# Patient Record
Sex: FEMALE | Race: OTHER | Employment: PART TIME | URBAN - METROPOLITAN AREA
[De-identification: names, ages, dates, MRNs, and addresses within clinical notes are randomized per-mention and may not be internally consistent; named-entity substitution may affect disease eponyms.]

---

## 2020-01-29 ENCOUNTER — HOSPITAL ENCOUNTER (OUTPATIENT)
Dept: PHYSICAL THERAPY | Age: 65
Discharge: HOME | End: 2020-01-29
Payer: MEDICARE

## 2020-01-29 PROCEDURE — 97110 THERAPEUTIC EXERCISES: CPT

## 2020-01-29 PROCEDURE — 97161 PT EVAL LOW COMPLEX 20 MIN: CPT

## 2020-01-29 NOTE — PROGRESS NOTES
PT DAILY TREATMENT NOTE/LUMBAR EVAL 10-18    Patient Name: Juanita Light  Date:2020  : 1955  [x]  Patient  Verified  Payor: Ludwig Drafts / Plan: [place] MED[place] & B / Product Type: Medicare /    In time:1110  Out time:1211  Total Treatment Time (min): 25  Visit #: 1 of 16    Medicare/BCBS Only   Total Timed Codes (min):  25 1:1 Treatment Time:  25     Treatment Area: Low back pain [M54.5]  SUBJECTIVE  Pain Level (0-10 scale): 0  []constant []intermittent [x]improving []worsening []no change since onset    Any medication changes, allergies to medications, adverse drug reactions, diagnosis change, or new procedure performed?: [x] No    [] Yes (see summary sheet for update)  Subjective functional status/changes:     Patient has c/c of low back and left LE pain since 2019 which progressively worsened. Temporary relief with injections. MRI revealed HNP L3-4. Patient underwent L3-4 discectomy on 19. Noted good relief of left LE pain with surgery but an increase in low back pain. Now referred to PT due to persistence of pain. Patient describes pain as lumbar aching and stiffnes. Pain is worse in AM. Reports intermittent numbness/tingling left anterior knee. Denies popping/clicking. Aggravating factors: left sidelying, standing >5 mins, walking >10 mins. Alleviating factors: brief walking, moist heat. Denies red flags: SOB, chest pain, dizziness/lightheadedness, blurred/double vision, HA, chills/fevers, night sweats, change in bowel/bladder control, abdominal pain, difficulty swallowing, slurred speech, unexplained weight gain/loss, nausea, vomiting. PMHx: OA, HTN, osteoporosis, colon cance. Surgical Hx: colon resection, bariatric surgery. Social Hx: , two level home, social alcohol, no tobacco, works 30 hours per week as . PLOF: treadmill 3 miles 3-4 x/wk. CLOF: unable to participate in cardiovascular exercise at present.   Diagnostic Imaging: MRI pre-op      OBJECTIVE/EXAMINATION    Precautions: right abdominal hernia, limit bending, lifting, twisting s/p discectomy    25 min [x]Eval                  []Re-Eval       25 min Therapeutic Exercise:  [x] See flow sheet :   Rationale: increase ROM, increase strength and decrease pain to improve the patients ability to complete ADLs          With   [] TE   [] TA   [] neuro   [] other: Patient Education: [x] Review HEP    [] Progressed/Changed HEP based on:   [] positioning   [] body mechanics   [] transfers   [] heat/ice application    [] other:      Physical Therapy Evaluation - Lumbar Spine    OBJECTIVE  Posture:  Lateral Shift: [] R    [] L     [] +  [] -  Kyphosis: [] Increased [] Decreased   []  WNL  Lordosis:  [] Increased [] Decreased   [] WNL  Pelvic symmetry: [] WNL    [] Other:    Gait:  [] Normal     [] Abnormal:    Active Movements: [] N/A   [] Too acute   [] Other:  ROM Degrees Comments:pain, area   Forward flexion  71   Fingertips 7 inches from floor   Extension  23   Increased pain   SB right  28    SB left  45    Rotation right  61    Rotation left  56        Neuro Screen [] WNL  Myotome/Dermatome/Reflexes:   Comments: Left L4 diminished sensation, sensitivity to touch    Dural Mobility:  SLR Supine: [] R    [] L    [] +    [x] -  @ (degrees):   Slump Test: [] R    [] L    [] +    [x] -  @ (degrees):   Prone Knee Bend: [] R    [] L    [] +    [x] -     Palpation  [] Min  [] Mod  [] Severe    Location: no tenderness to palpation lumbar or left LE, incision well healed  [] Min  [] Mod  [] Severe    Location:    Strength   L(0-5) R (0-5)   Hip Flexion (L1,2) 3+ 5   Knee Extension (L3,4) 3+ 5   Ankle Dorsiflexion (L4) 5 5   Great Toe Extension (L5) 5 5   Ankle Plantarflexion (S1) 5 5   Knee Flexion (S1,2) 4 5   Abdominals 2 2   Paraspinals 4 5   Back Rotators 4 5   Gluteus Niles 4 5   Hip Abduction 4 5     Special Tests  Lumbar:  Lumb.  Compression: [] Pos  [x] Neg               Lumbar Distraction: [] Pos  [x] Neg    Quadrant:  [x] Pos  [] Neg   [] Flex  [x] Ext    Sacroilliac:  Gaenslen's: [] R    [] L    [] +    [x] -     Compression: [] +    [x] -     Gapping:  [] +    [x] -              Hip: Cresenciano Son:  [] R    [] L    [] +    [x] -     Scour:  [] R    [] L    [] +    [x] -     Piriformis: [] R    [] L    [] +    [] -     Other tests/comments:       Pain Level (0-10 scale) post treatment: lumbar 0    ASSESSMENT/Changes in Function: Patient presents s/p L3-4 discectomy performed 11/26/19 with deficits in trunk AROM, core/abdominal and left LE strength with limited activity tolerance and mild to moderate lumbar pain. Patient will continue to benefit from skilled PT services to modify and progress therapeutic interventions, address functional mobility deficits, address ROM deficits, address strength deficits, analyze and address soft tissue restrictions, analyze and cue movement patterns, analyze and modify body mechanics/ergonomics and assess and modify postural abnormalities to attain remaining goals.      [x]  See Plan of Care  []  See progress note/recertification  []  See Discharge Summary         Progress towards goals / Updated goals:  See POC    PLAN  []  Upgrade activities as tolerated     [x]  Continue plan of care  []  Update interventions per flow sheet       []  Discharge due to:_  []  Other:_      Ulises Bates PT 1/29/2020  11:14 AM

## 2020-01-29 NOTE — PROGRESS NOTES
[facility] at [address] [place] [address] Drive: [phone]   Fax: [phone]  PLAN OF CARE / 5 [facility]  Patient Name: Fahad Harden : 1955   Medical   Diagnosis: Low back pain [M54.5] Treatment Diagnosis: Low back pain   Onset Date: 2019     Referral Source: Kota Snyder MD Start of Care [facility] [place]): 2020   Prior Hospitalization: See medical history Provider #: [id number]   Prior Level of Function: treadmill 3 miles 3-4 x/wk   Comorbidities: OA, HTN, osteoporosis, colon cancer   Medications: Verified on Patient Summary List   The Plan of Care and following information is based on the information from the initial evaluation.   ===========================================================================================  Assessment / key information:  Fahad Harden is a 75 y.o.  female who presents s/p L3-4 discectomy performed 19 with deficits in trunk AROM, core/abdominal and left LE strength with limited activity tolerance and mild to moderate lumbar pain.     Patient will continue to benefit from skilled PT services to modify and progress therapeutic interventions, address functional mobility deficits, address ROM deficits, address strength deficits, analyze and address soft tissue restrictions, analyze and cue movement patterns, analyze and modify body mechanics/ergonomics and assess and modify postural abnormalities to attain remaining goals.       Pt instructed in HEP and will f/u in clinic for PT.  ===========================================================================================  Eval Complexity: History MEDIUM  Complexity : 1-2 comorbidities / personal factors will impact the outcome/ POC ;  Examination  LOW Complexity : 1-2 Standardized tests and measures addressing body structure, function, activity limitation and / or participation in recreation ; Presentation LOW Complexity : Stable, uncomplicated ;  Decision Making MEDIUM Complexity : FOTO score of 26-74; : FOTO score = an established functional score where 100 = no disability  Overall Complexity LOW   Problem List: pain affecting function, decrease ROM, decrease strength, impaired gait/ balance, decrease ADL/ functional abilitiies, decrease activity tolerance, decrease flexibility/ joint mobility and decrease transfer abilities   Treatment Plan may include any combination of the following: Therapeutic exercise, Therapeutic activities, Neuromuscular re-education, Physical agent/modality, Gait/balance training, Manual therapy, Aquatic therapy, Patient education, Self Care training and Functional mobility training  Patient / Family readiness to learn indicated by: asking questions, trying to perform skills and interest  Persons(s) to be included in education: patient (P)  Barriers to Learning/Limitations: no  Measures Taken: NA  Patient Goal (s): \"Strengthen my back. \"   Patient self reported health status: good  Rehabilitation Potential: good  Goals:  Short Term Goals: To be accomplished in 4 weeks:   Patient will report compliance with HEP 1x/day to aid in rehabilitation program.   Status at IE: Patient instructed in and provided written copy of initial Home Exercise Program.   Current: Same as IE      Patient will increase lumbar ROM to WNL in all planes to aid in completion of ADLs. Status at IE:  ROM Degrees Comments:pain, area   Forward flexion  71   Fingertips 7 inches from floor   Extension  23   Increased pain   SB right  28     SB left  45     Rotation right  61     Rotation left  56      Current: Same as IE    Long Term Goals: To be accomplished in 8 weeks:   Patient will increase core and left LE strength to 4+/5 MMT throughout to aid in completion of ADLs.    Status at IE:   L(0-5) R (0-5)   Hip Flexion (L1,2) 3+ 5   Knee Extension (L3,4) 3+ 5   Ankle Plantarflexion (S1) 5 5   Knee Flexion (S1,2) 4 5   Abdominals 2 2 Paraspinals 4 5   Back Rotators 4 5   Gluteus Niles 4 5   Hip Abduction 4 5    Current: Same as IE     Patient will report pain no greater than 2/10 throughout entire day to aid in completion of ADLs. Status at IE: 0-9   Current: Same as IE     Patent will be able to return to walking on treadmill one hour/ 3 miles as part of regular exercise regimen. Status at IE: currently unable to tolerate treadmill. Current: Same as IE     Patient will improve FOTO (an established functional score where 100 = no disability) score to 63 points to demonstrate improvement in functional status. Status at IE: 46   Current: Same as IE        Frequency / Duration:   Patient to be seen 2  times per week for 8  weeks:  Patient / Caregiver education and instruction: self care and exercises      . Therapist Signature: Matheus Mesa DPT Date: 2/31/7656   Certification Period: 1/29/2020 to 4/27/2020 Time: 12:13 PM   ===========================================================================================  I certify that the above Physical Therapy Services are being furnished while the patient is under my care. I agree with the treatment plan and certify that this therapy is necessary. Physician Signature:        Date:       Time:     Please sign and return to [facility] or you may fax the signed copy to [phone]. Thank you.

## 2020-02-04 ENCOUNTER — HOSPITAL ENCOUNTER (OUTPATIENT)
Dept: PHYSICAL THERAPY | Age: 65
Discharge: HOME | End: 2020-02-04
Payer: MEDICARE

## 2020-02-04 PROCEDURE — 97110 THERAPEUTIC EXERCISES: CPT

## 2020-02-04 NOTE — PROGRESS NOTES
PT DAILY TREATMENT NOTE - [facility]     Patient Name: Zenon Santos  Date:2020  : 1955  [x]  Patient  Verified  Payor: Taylor Martinez / Plan: [place] MEDICARE PART A & B / Product Type: Medicare /    In time:1100  Out time:1145  Total Treatment Time (min): 45  Total Timed Codes (min): 45   1:1 Treatment Time ( [address] only): 45   Visit #: 2 of 16    Treatment Area: Low back pain [M54.5]    SUBJECTIVE  Pain Level (0-10 scale): 2  Any medication changes, allergies to medications, adverse drug reactions, diagnosis change, or new procedure performed?: [x] No    [] Yes (see summary sheet for update)  Subjective functional status/changes:   [] No changes reported    Patient reports that she has low back pain today. She states that she expects to have back pain . OBJECTIVE    45 min Therapeutic Exercise:  [x] See flow sheet :   Rationale: increase ROM, increase strength and decrease pain to improve the patients ability to complete ADLs          With   [x] TE   [] TA   [] neuro   [] other: Patient Education: [x] Review HEP    [] Progressed/Changed HEP based on:   [] positioning   [] body mechanics   [] transfers   [] heat/ice application    [] other:      Other Objective/Functional Measures: NA     Pain Level (0-10 scale) post treatment: 0    ASSESSMENT/Changes in Function: Patient responds well to treatment session. Patient required cues for activity pacing. Reviewed HEP to promote good carryover at home. No adverse effects were noted from today's treatment session      Patient will continue to benefit from skilled PT services to modify and progress therapeutic interventions, address functional mobility deficits, address ROM deficits, address strength deficits, analyze and address soft tissue restrictions, analyze and cue movement patterns, analyze and modify body mechanics/ergonomics, assess and modify postural abnormalities, address imbalance and instruct in home and community integration to attain remaining goals. []  See Plan of Care  []  See progress note/recertification  []  See Discharge Summary         Progress towards goals / Updated goals:  Short Term Goals: To be accomplished in 4 weeks:                   Patient will report compliance with HEP 1x/day to aid in rehabilitation program.                   Status at IE: Patient instructed in and provided written copy of initial Home Exercise Program.                   Current: In-progress reviewed HEP. 2/4/2020                      Patient will increase lumbar ROM to WNL in all planes to aid in completion of ADLs. Status at IE:  ROM Degrees Comments:pain, area   Forward flexion  71   Fingertips 7 inches from floor   Extension  23   Increased pain   SB right  28     SB left  45     Rotation right  61     Rotation left  56                      Current: Same as IE     Long Term Goals: To be accomplished in 8 weeks:                   Patient will increase core and left LE strength to 4+/5 MMT throughout to aid in completion of ADLs. Status at IE:    L(0-5) R (0-5)   Hip Flexion (L1,2) 3+ 5   Knee Extension (L3,4) 3+ 5   Ankle Plantarflexion (S1) 5 5   Knee Flexion (S1,2) 4 5   Abdominals 2 2   Paraspinals 4 5   Back Rotators 4 5   Gluteus Niles 4 5   Hip Abduction 4 5                    Current: Same as IE                      Patient will report pain no greater than 2/10 throughout entire day to aid in completion of ADLs. Status at IE: 0-9                   Current: Same as IE                      Patent will be able to return to walking on treadmill one hour/ 3 miles as part of regular exercise regimen. Status at IE: currently unable to tolerate treadmill. Current: Same as IE                      Patient will improve FOTO (an established functional score where 100 = no disability) score to 63 points to demonstrate improvement in functional status.                    Status at IE: 46                   Current: Same as IE    PLAN  []  Upgrade activities as tolerated     [x]  Continue plan of care  []  Update interventions per flow sheet       []  Discharge due to:_  []  Other:_      Karo Guzman PT, DPT 2/4/2020  10:47 AM    Future Appointments   Date Time Provider Herbie Johnston   2/4/2020 11:00 AM Doljd Hurtado, PT [facility]   2/11/2020 10:30 AM Dolphus Bryant, PT [facility]   2/13/2020 11:00 AM Dolphus Bryant, PT [facility]   2/18/2020 10:30 AM Dolphus Bryant, PT [facility]   2/20/2020 11:00 AM Mendez Da Silva, PT [facility]   2/25/2020 10:00 AM Chrisus Bryant, PT [facility]   2/27/2020 11:00 AM Mendez Da Silva PT [facility]

## 2020-02-11 ENCOUNTER — HOSPITAL ENCOUNTER (OUTPATIENT)
Dept: PHYSICAL THERAPY | Age: 65
Discharge: HOME | End: 2020-02-11
Payer: MEDICARE

## 2020-02-11 PROCEDURE — 97110 THERAPEUTIC EXERCISES: CPT

## 2020-02-11 NOTE — PROGRESS NOTES
PT DAILY TREATMENT NOTE - [facility]     Patient Name: Raysa Sessions  Date:2020  : 1955  [x]  Patient  Verified  Payor: Shivani Redding / Plan: [place] MEDICARE PART A & B / Product Type: Medicare /    In time:1025  Out time:1112  Total Treatment Time (min): 47  Total Timed Codes (min): 47   1:1 Treatment Time ( only): 52   Visit #: 3 of 16    Treatment Area: Low back pain [M54.5]    SUBJECTIVE  Pain Level (0-10 scale): 1  Any medication changes, allergies to medications, adverse drug reactions, diagnosis change, or new procedure performed?: [x] No    [] Yes (see summary sheet for update)  Subjective functional status/changes:   [] No changes reported    Patient reports that she had muscle soreness after last visit. OBJECTIVE    47 min Therapeutic Exercise:  [x] See flow sheet :   Rationale: increase ROM, increase strength and decrease pain to improve the patients ability to complete ADLs        With   [x] TE   [] TA   [] neuro   [] other: Patient Education: [x] Review HEP    [] Progressed/Changed HEP based on:   [] positioning   [] body mechanics   [] transfers   [] heat/ice application    [] other:      Other Objective/Functional Measures: NA     Pain Level (0-10 scale) post treatment: 0    ASSESSMENT/Changes in Function:     Patient responds well to treatment session. Patient required cues to maintain proper mechanics with open book exercise. She was challenged with exercises prescribed. No adverse effects were noted from today's treatment session    Patient will continue to benefit from skilled PT services to modify and progress therapeutic interventions, address functional mobility deficits, address ROM deficits, address strength deficits, analyze and address soft tissue restrictions, analyze and cue movement patterns, analyze and modify body mechanics/ergonomics, assess and modify postural abnormalities, instruct in home and community integration to attain remaining goals.       []  See Plan of Care  []  See progress note/recertification  []  See Discharge Summary         Progress towards goals / Updated goals:  Short Term Goals: To be accomplished in 4 weeks:                   BSYJYLE will report compliance with HEP 1x/day to aid in rehabilitation program.                   Status at IE: Patient instructed in and provided written copy of initial Home Exercise Program.                   Current: In-progress developing consistency HEP 2/11/2020                      Patient will increase lumbar ROM to WNL in all planes to aid in completion of ADLs.                  Status at IE:  ROM Degrees Comments:pain, area   Forward flexion  71   Fingertips 7 inches from floor   Extension  23   Increased pain   SB right  28     SB left  45     Rotation right  61     Rotation left  56                      Current: Same as IE     Long Term Goals: To be accomplished in 8 weeks:                   [id number] will increase core and left LE strength to 4+/5 MMT throughout to aid in completion of ADLs.                  Status at IE:    L(0-5) R (0-5)   Hip Flexion (L1,2) 3+ 5   Knee Extension (L3,4) 3+ 5   Ankle Plantarflexion (S1) 5 5   Knee Flexion (S1,2) 4 5   Abdominals 2 2   Paraspinals 4 5   Back Rotators 4 5   Gluteus Niles 4 5   Hip Abduction 4 5                    Current: Same as IE                      Patient will report pain no greater than 2/10 throughout entire day to aid in completion of ADLs.                  Status at IE: 0-9                   Current: Same as IE                      Patent will be able to return to walking on treadmill one hour/ 3 miles as part of regular exercise regimen.                   Status at IE: currently unable to tolerate treadmill.                   Current: Same as IE                      Patient will improve FOTO (an established functional score where 100 = no disability) score to 63 points to demonstrate improvement in functional status.                    Status at IE: 52                   Current: Same as IE    PLAN  []  Upgrade activities as tolerated     [x]  Continue plan of care  []  Update interventions per flow sheet       []  Discharge due to:_  []  Other:_      Aidan Huang, PT, DPT 2/11/2020  10:48 AM    Future Appointments   Date Time Provider Herbie Johnston   2/13/2020 11:00 AM Toy Delay, PT [facility]   2/18/2020 10:30 AM Toy Delay, PT [facility]   2/20/2020 11:00 AM Jeremiah Mariscal, PT [facility]   2/25/2020 10:00 AM Toy Delay, PT [facility]   2/27/2020 11:00 AM Jeremiah Mariscal, PT [facility]

## 2020-02-13 ENCOUNTER — HOSPITAL ENCOUNTER (OUTPATIENT)
Dept: PHYSICAL THERAPY | Age: 65
Discharge: HOME | End: 2020-02-13
Payer: MEDICARE

## 2020-02-13 PROCEDURE — 97110 THERAPEUTIC EXERCISES: CPT

## 2020-02-13 NOTE — PROGRESS NOTES
PT DAILY TREATMENT NOTE    Patient Name: Valdo Earl  Date:2020  : 1955  [x]  Patient  Verified  Payor: Albert Moss / Plan: [place] / Product Type: Medicare /    In time: 1100  Out time: 8942  Total Treatment Time (min): 49  Total Timed Codes (min): 44  1:1 Treatment Time ( only): 44   Visit #: 4 of 16    Treatment Area: Low back pain [M54.5]    SUBJECTIVE  Pain Level (0-10 scale): 0-1  Any medication changes, allergies to medications, adverse drug reactions, diagnosis change, or new procedure performed?: [x] No    [] Yes (see summary sheet for update)  Subjective functional status/changes:   [] No changes reported  \"The only really bad time I am having is first thing in the morning, or if I sit too long, it gets stiff and kind of stuck, and then get sharp pain when I move. \"    OBJECTIVE    44 min Therapeutic Exercise:  [x] See flow sheet :   Rationale: increase ROM, increase strength and decrease pain to improve the patients ability to complete ADLs  ambulation safety and efficiency in order to improve patient's ability to safely ambulate at home for self care. With   [] TE   [] TA   [] neuro   [] other: Patient Education: [x] Review HEP    [] Progressed/Changed HEP based on:   [] positioning   [] body mechanics   [] transfers   [] heat/ice application    [] other:      Other Objective/Functional Measures: NA     Pain Level (0-10 scale) post treatment: 0    ASSESSMENT/Changes in Function: Patient has purchased 100 [employer] for [employer]. Added further Swiss Ball exs to HEP. Patient responds well to treatment session. No adverse effects were noted from today's treatment session.      Patient will continue to benefit from skilled PT services to modify and progress therapeutic interventions, address functional mobility deficits, address ROM deficits, address strength deficits, analyze and address soft tissue restrictions, analyze and cue movement patterns, analyze and modify body mechanics/ergonomics, assess and modify postural abnormalities,  and instruct in home and community integration to attain remaining goals. []  See Plan of Care  []  See progress note/recertification  []  See Discharge Summary         Progress towards goals / Updated goals:  Short Term Goals: To be accomplished in 4 weeks:                   LSPWFKP will report compliance with HEP 1x/day to aid in rehabilitation program.                   Status at IE: Patient instructed in and provided written copy of initial Home Exercise Program.                   Current:  Has purchased 100 [employer] for HEP. Added further Swiss Ball exs to HEP.   2/13/2020                      Patient will increase lumbar ROM to WNL in all planes to aid in completion of ADLs.                  Status at IE:  ROM Degrees Comments:pain, area   Forward flexion  71   Fingertips 7 inches from floor   Extension  23   Increased pain   SB right  28     SB left  45     Rotation right  61     Rotation left  56                      Current: Same as IE     Long Term Goals: To be accomplished in 8 weeks:                   FLTLCGP will increase core and left LE strength to 4+/5 MMT throughout to aid in completion of ADLs.                  Status at IE:    L(0-5) R (0-5)   Hip Flexion (L1,2) 3+ 5   Knee Extension (L3,4) 3+ 5   Ankle Plantarflexion (S1) 5 5   Knee Flexion (S1,2) 4 5   Abdominals 2 2   Paraspinals 4 5   Back Rotators 4 5   Gluteus Niles 4 5   Hip Abduction 4 5                    Current: Same as IE                      Patient will report pain no greater than 2/10 throughout entire day to aid in completion of ADLs.                    Status at IE: 0-9                   Current: Same as IE                      Patent will be able to return to walking on treadmill one hour/ 3 miles as part of regular exercise regimen.                   Status at IE: currently unable to tolerate treadmill.                   Current: Same as IE                      Patient will improve FOTO (an established functional score where 100 = no disability) score to 63 points to demonstrate improvement in functional status.                    Status at IE: 46                   Current: Same as IE      PLAN  []  Upgrade activities as tolerated     [x]  Continue plan of care  []  Update interventions per flow sheet       []  Discharge due to:_  []  Other:_      Alison Hobson, PT, DPT 2/13/2020  11:00 AM    Future Appointments   Date Time Provider Herbie Johnston   2/18/2020 10:30 AM Hetty Castleman, PT [facility]   2/20/2020 11:00 AM JANNY Tate [facility]   2/25/2020 10:00 AM Hetty Castleman, PT [facility]   2/27/2020 11:00 AM JANNY Tate [facility]

## 2020-02-18 ENCOUNTER — HOSPITAL ENCOUNTER (OUTPATIENT)
Dept: PHYSICAL THERAPY | Age: 65
Discharge: HOME | End: 2020-02-18
Payer: MEDICARE

## 2020-02-18 PROCEDURE — 97110 THERAPEUTIC EXERCISES: CPT

## 2020-02-18 NOTE — PROGRESS NOTES
PT DAILY TREATMENT NOTE - [facility]     Patient Name: Javon Payment  Date:2020  : 1955  [x]  Patient  Verified  Payor: Yaniv Many / Plan: [place] MEDICARE PART A & B / Product Type: Medicare /    In time:1035  Out time:1122  Total Treatment Time (min): 47  Total Timed Codes (min): 47   1:1 Treatment Time ( only): 25   Visit #: 5 of 16    Treatment Area: Low back pain [M54.5]    SUBJECTIVE  Pain Level (0-10 scale): 0  Any medication changes, allergies to medications, adverse drug reactions, diagnosis change, or new procedure performed?: [x] No    [] Yes (see summary sheet for update)  Subjective functional status/changes:   [] No changes reported    Patient reports that she is tired from watching her grandchildren. OBJECTIVE     25 min Therapeutic Exercise:  [x] See flow sheet :   Rationale: increase ROM, increase strength and decrease pain to improve the patients ability to complete ADLs        With   [x] TE   [] TA   [] neuro   [] other: Patient Education: [x] Review HEP    [] Progressed/Changed HEP based on:   [] positioning   [] body mechanics   [] transfers   [] heat/ice application    [] other:      Other Objective/Functional Measures: NA     Pain Level (0-10 scale) post treatment: 0    ASSESSMENT/Changes in Function: Patient responds well to treatment session. Reviewed swiss ball activities to promote good carryover at home. Patient demonstrated understanding. No adverse effects were noted from today's treatment session    Patient will continue to benefit from skilled PT services to modify and progress therapeutic interventions, address functional mobility deficits, address ROM deficits, address strength deficits, analyze and address soft tissue restrictions, analyze and cue movement patterns, analyze and modify body mechanics/ergonomics, assess and modify postural abnormalities,  instruct in home and community integration to attain remaining goals.       []  See Plan of Care  []  See progress note/recertification  []  See Discharge Summary         Progress towards goals / Updated goals:  Short Term Goals: To be accomplished in 4 weeks:                   AUDREY will report compliance with HEP 1x/day to aid in rehabilitation program.                   Status at IE: Patient instructed in and provided written copy of initial Home Exercise Program.                   Current:  Has purchased 100 [place] for HEP. Added further Swiss Ball exs to HEP.   2/13/2020                      Patient will increase lumbar ROM to WNL in all planes to aid in completion of ADLs.                  Status at IE:  ROM Degrees Comments:pain, area   Forward flexion  71   Fingertips 7 inches from floor   Extension  23   Increased pain   SB right  28     SB left  45     Rotation right  61     Rotation left  56                      Current: Same as IE     Long Term Goals: To be accomplished in 8 weeks:                   LWFKRBB will increase core and left LE strength to 4+/5 MMT throughout to aid in completion of ADLs.                  Status at IE:    L(0-5) R (0-5)   Hip Flexion (L1,2) 3+ 5   Knee Extension (L3,4) 3+ 5   Ankle Plantarflexion (S1) 5 5   Knee Flexion (S1,2) 4 5   Abdominals 2 2   Paraspinals 4 5   Back Rotators 4 5   Gluteus Niles 4 5   Hip Abduction 4 5                    Current: Same as IE                      Patient will report pain no greater than 2/10 throughout entire day to aid in completion of ADLs.                  Status at IE: 0-9                   Current: Same as IE                      Patent will be able to return to walking on treadmill one hour/ 3 miles as part of regular exercise regimen.                   Status at IE: currently unable to tolerate treadmill.                   Current: Same as IE                      Patient will improve FOTO (an established functional score where 100 = no disability) score to 63 points to demonstrate improvement in functional status.                    Status at IE: 46                   Current: Same as IE      PLAN  []  Upgrade activities as tolerated     [x]  Continue plan of care  []  Update interventions per flow sheet       []  Discharge due to:_  []  Other:_      Aidan Huang, PT, DPT 2/18/2020  10:43 AM    Future Appointments   Date Time Provider Herbie Johnston   2/20/2020 11:00 AM JANNY Mcginnis [facility]   2/25/2020 10:00 AM JANNY Dove [facility]   2/27/2020 11:00 AM JANNY Mcginnis [facility]

## 2020-02-20 ENCOUNTER — HOSPITAL ENCOUNTER (OUTPATIENT)
Dept: PHYSICAL THERAPY | Age: 65
Discharge: HOME | End: 2020-02-20
Payer: MEDICARE

## 2020-02-20 PROCEDURE — 97110 THERAPEUTIC EXERCISES: CPT

## 2020-02-20 NOTE — PROGRESS NOTES
PT DAILY TREATMENT NOTE    Patient Name: Zackary Goodell  Date:2020  : 1955  [x]  Patient  Verified  Payor: Joanna Andres / Plan: [place] MEDICARE PART A & B / Product Type: Medicare /    In time: 1100  Out time: 8456  Total Treatment Time (min): 49  Total Timed Codes (min): 44  1:1 Treatment Time ( only): 44   Visit #: 6 of 16    Treatment Area: Low back pain [M54.5]    SUBJECTIVE  Pain Level (0-10 scale): 0  Any medication changes, allergies to medications, adverse drug reactions, diagnosis change, or new procedure performed?: [x] No    [] Yes (see summary sheet for update)  Subjective functional status/changes:   [] No changes reported  \"I don't have much pain during the day anymore. Just some burning down left leg every once in a while. But, at night is my big problem. The pain can get real bad at night. \"    OBJECTIVE    44 min Therapeutic Exercise:  [x] See flow sheet :   Rationale: increase ROM, increase strength and decrease pain to improve the patients ability to complete ADLs  ambulation safety and efficiency in order to improve patient's ability to safely ambulate at home for self care. With   [] TE   [] TA   [] neuro   [] other: Patient Education: [x] Review HEP    [] Progressed/Changed HEP based on:   [] positioning   [] body mechanics   [] transfers   [] heat/ice application    [] other:      Other Objective/Functional Measures: NA     Pain Level (0-10 scale) post treatment: 0    ASSESSMENT/Changes in Function: Patient educated further regarding nature of night pain and importance of performing stretching exercises just before going to sleep. Patient responds well to treatment session. No adverse effects were noted from today's treatment session.      Patient will continue to benefit from skilled PT services to modify and progress therapeutic interventions, address functional mobility deficits, address ROM deficits, address strength deficits, analyze and address soft tissue restrictions, analyze and cue movement patterns, analyze and modify body mechanics/ergonomics, assess and modify postural abnormalities,  and instruct in home and community integration to attain remaining goals. []  See Plan of Care  []  See progress note/recertification  []  See Discharge Summary         Progress towards goals / Updated goals:  Short Term Goals: To be accomplished in 4 weeks:                   KIEEDIQ will report compliance with HEP 1x/day to aid in rehabilitation program.                   Status at IE: Patient instructed in and provided written copy of initial Home Exercise Program.                   Current:  Has purchased 100 [employer] for HEP. Added further Swiss Ball exs to HEP.    2/13/2020                      Patient will increase lumbar ROM to WNL in all planes to aid in completion of ADLs.                  Status at IE:  ROM Degrees Comments:pain, area   Forward flexion  71   Fingertips 7 inches from floor   Extension  23   Increased pain   SB right  28     SB left  45     Rotation right  61     Rotation left  56                      Current: Flexion remains 71 with fingertips 7 inches from floor, extension improve to 31, side bend right improved 34.   2/20/2020     Long Term Goals: To be accomplished in 8 weeks:                   Patient will increase core and left LE strength to 4+/5 MMT throughout to aid in completion of ADLs.                  Status at IE:    L(0-5) R (0-5)   Hip Flexion (L1,2) 3+ 5   Knee Extension (L3,4) 3+ 5   Ankle Plantarflexion (S1) 5 5   Knee Flexion (S1,2) 4 5   Abdominals 2 2   Paraspinals 4 5   Back Rotators 4 5   Gluteus Niles 4 5   Hip Abduction 4 5                    Current: Same as IE                      Patient will report pain no greater than 2/10 throughout entire day to aid in completion of ADLs.                    Status at IE: 0-9                   Current: Same as IE                      Patent will be able to return to walking on treadmill one hour/ 3 miles as part of regular exercise regimen.                   Status at IE: currently unable to tolerate treadmill.                   Current: Same as IE                      Patient will improve FOTO (an established functional score where 100 = no disability) score to 63 points to demonstrate improvement in functional status.                    Status at IE: 46                   Current: Same as IE     PLAN  []  Upgrade activities as tolerated     [x]  Continue plan of care  []  Update interventions per flow sheet       []  Discharge due to:_  []  Other:_      Kyara Duffy PT, DPT 2/20/2020  11:14 AM    Future Appointments   Date Time Provider Herbie Johnston   2/25/2020 10:00 AM Alvarez Glez, PT ELSA [facility]   2/27/2020 11:00 AM Modesta Wood, JANNY HUNTER [facility]

## 2020-02-25 ENCOUNTER — HOSPITAL ENCOUNTER (OUTPATIENT)
Dept: PHYSICAL THERAPY | Age: 65
Discharge: HOME | End: 2020-02-25
Payer: MEDICARE

## 2020-02-25 PROCEDURE — 97110 THERAPEUTIC EXERCISES: CPT

## 2020-02-25 NOTE — PROGRESS NOTES
PT DAILY TREATMENT NOTE - [facility]     Patient Name: Mounika Andres  ADSA:  : 1955  [x]  Patient  Verified  Payor: [place] MEDICARE / Plan: [place] MEDICARE PART A & B / Product Type: Medicare /    In time:1000  Out time:1044  Total Treatment Time (min): 44  Total Timed Codes (min): 44   1:1 Treatment Time ( only): 40   Visit #: 7 of 16    Treatment Area: Low back pain [M54.5]    SUBJECTIVE  Pain Level (0-10 scale): 0  Any medication changes, allergies to medications, adverse drug reactions, diagnosis change, or new procedure performed?: [x] No    [] Yes (see summary sheet for update)  Subjective functional status/changes:   [] No changes reported    Patient reports that she has soreness after exercise but then feels better a day later. She states that getting out of bed in the morning is the most difficult. OBJECTIVE    44 min Therapeutic Exercise:  [x] See flow sheet :   Rationale: increase ROM, increase strength and decrease pain to improve the patients ability to complete ADLs          With   [x] TE   [] TA   [] neuro   [] other: Patient Education: [x] Review HEP    [] Progressed/Changed HEP based on:   [] positioning   [] body mechanics   [] transfers   [] heat/ice application    [] other:      Other Objective/Functional Measures: NA     Pain Level (0-10 scale) post treatment: 0    ASSESSMENT/Changes in Function: Patient responds well to treatment session. Patient required cues for activity pacing with theraband activities. Will progress resistance next visit.  No adverse effects were noted from today's treatment session    Patient will continue to benefit from skilled PT services to modify and progress therapeutic interventions, address functional mobility deficits, address ROM deficits, address strength deficits, analyze and address soft tissue restrictions, analyze and cue movement patterns, analyze and modify body mechanics/ergonomics, assess and modify postural abnormalities, instruct in home and community integration to attain remaining goals. []  See Plan of Care  []  See progress note/recertification  []  See Discharge Summary         Progress towards goals / Updated goals:  Short Term Goals: To be accomplished in 4 weeks:                   OKTGGSP will report compliance with HEP 1x/day to aid in rehabilitation program.                   Status at IE: Patient instructed in and provided written copy of initial Home Exercise Program.                   Current:  Has purchased 100 [employer] for HEP. Added further Swiss Ball exs to HEP.    2/13/2020                      Patient will increase lumbar ROM to WNL in all planes to aid in completion of ADLs.                  Status at IE:  ROM Degrees Comments:pain, area   Forward flexion  71   Fingertips 7 inches from floor   Extension  23   Increased pain   SB right  28     SB left  45     Rotation right  61     Rotation left  56                      Current: Flexion remains 71 with fingertips 7 inches from floor, extension improve to 31, side bend right improved 34.   2/20/2020     Long Term Goals: To be accomplished in 8 weeks:                   Patient will increase core and left LE strength to 4+/5 MMT throughout to aid in completion of ADLs.                  Status at IE:    L(0-5) R (0-5)   Hip Flexion (L1,2) 3+ 5   Knee Extension (L3,4) 3+ 5   Ankle Plantarflexion (S1) 5 5   Knee Flexion (S1,2) 4 5   Abdominals 2 2   Paraspinals 4 5   Back Rotators 4 5   Gluteus Niles 4 5   Hip Abduction 4 5                    Current: Same as IE                      Patient will report pain no greater than 2/10 throughout entire day to aid in completion of ADLs.                    Status at IE: 0-9                   Current: Same as IE                      Patent will be able to return to walking on treadmill one hour/ 3 miles as part of regular exercise regimen.                   Status at IE: currently unable to tolerate treadmill.                   Current: Same as IE                      Patient will improve FOTO (an established functional score where 100 = no disability) score to 63 points to demonstrate improvement in functional status.                  Status at IE: 52                   Current:   In-progress, 57 2/25/2020    PLAN  []  Upgrade activities as tolerated     [x]  Continue plan of care  []  Update interventions per flow sheet       []  Discharge due to:_  []  Other:_      Dayton Antunez, PT, DPT 2/25/2020  9:52 AM    Future Appointments   Date Time Provider Herbie Johnston   2/25/2020 10:00 AM Son Kawasaki, PT [facility]   2/27/2020 11:00 AM Flor Jones, PT [facility]   3/2/2020 11:00 AM Bo Shouts, PT [facility]   3/5/2020 10:30 AM Son Kawasaki, PT [facility]   3/10/2020 10:00 AM Son Kawasaki, PT [facility]   3/12/2020  5:30 PM Naseem Ghotra [place] [facility]   3/16/2020 10:00 AM Son Kawasaki, PT [facility]   3/18/2020 10:00 AM Son Kawasaki, PT [facility]   3/23/2020 10:00 AM Hershal Shouts, PT [facility]   3/25/2020 10:00 AM Son Kawasaki, PT [facility]   3/30/2020 10:00 AM Hershal Shouts, PT [facility]

## 2020-02-27 ENCOUNTER — APPOINTMENT (OUTPATIENT)
Dept: PHYSICAL THERAPY | Age: 65
End: 2020-02-27
Payer: MEDICARE

## 2020-03-02 ENCOUNTER — HOSPITAL ENCOUNTER (OUTPATIENT)
Dept: PHYSICAL THERAPY | Age: 65
Discharge: HOME | End: 2020-03-02
Payer: MEDICARE

## 2020-03-02 PROCEDURE — 97110 THERAPEUTIC EXERCISES: CPT

## 2020-03-02 NOTE — PROGRESS NOTES
PT DAILY TREATMENT NOTE    Patient Name: Everardo Muir  Date:3/2/2020  : 1955  [x]  Patient  Verified  Payor: Elayne Rich / Plan: [place] / Product Type: Medicare /    In time: 1036  Out time: 1150  Total Treatment Time (min): 45  Total Timed Codes (min): 40  1:1 Treatment Time ( only): 30   Visit #: 8 of 16    Treatment Area: Low back pain [M54.5]    SUBJECTIVE  Pain Level (0-10 scale): 0  Any medication changes, allergies to medications, adverse drug reactions, diagnosis change, or new procedure performed?: [x] No    [] Yes (see summary sheet for update)  Subjective functional status/changes:   [] No changes reported  \"Mornings are still the toughest time for me. But, the pain goes away after I stretch out. OBJECTIVE    30 min Therapeutic Exercise:  [x] See flow sheet :   Rationale: increase ROM, increase strength and decrease pain to improve the patients ability to complete ADLs  ambulation safety and efficiency in order to improve patient's ability to safely ambulate at home for self care. With   [] TE   [] TA   [] neuro   [] other: Patient Education: [x] Review HEP    [] Progressed/Changed HEP based on:   [] positioning   [] body mechanics   [] transfers   [] heat/ice application    [] other:      Other Objective/Functional Measures: NA     Pain Level (0-10 scale) post treatment: 0    ASSESSMENT/Changes in Function: Patient is well motivated and cooperative with PT and is making good progress with PT, noting decreased overall pain intensity and improving strength and activity tolerance. Primary remaining complaint is marked increased pain with prolonged static positions such as sleeping or sitting >1 hour. Addressing this issue through increased frequency of stretching exercises.      Patient will continue to benefit from skilled PT services to modify and progress therapeutic interventions, address functional mobility deficits, address ROM deficits, address strength deficits, analyze and address soft tissue restrictions, analyze and cue movement patterns, analyze and modify body mechanics/ergonomics, assess and modify postural abnormalities,  and instruct in home and community integration to attain remaining goals. []  See Plan of Care  [x]  See progress note/recertification  []  See Discharge Summary         Progress towards goals / Updated goals:  Short Term Goals: To be accomplished in 4 weeks:                   UPVXUZW will report compliance with HEP 1x/day to aid in rehabilitation program.                   Status at IE: Patient instructed in and provided written copy of initial Home Exercise Program.                   Current:  Has purchased 100 [place] for HEP. Added further [place] exs to HEP.    2/13/2020                      Patient will increase lumbar ROM to WNL in all planes to aid in completion of ADLs.                  Status at IE:  ROM Degrees Comments:pain, area   Forward flexion  71   Fingertips 7 inches from floor   Extension  23   Increased pain   SB right  28     SB left  45     Rotation right  61     Rotation left  56                      Current: Flexion remains 71 with fingertips 7 inches from floor, extension improve to 31, side bend right improved 34.   2/20/2020     Long Term Goals: To be accomplished in 8 weeks:                   Patient will increase core and left LE strength to 4+/5 MMT throughout to aid in completion of ADLs.                  Status at IE:    L(0-5) R (0-5)   Hip Flexion (L1,2) 3+ 5   Knee Extension (L3,4) 3+ 5   Ankle Plantarflexion (S1) 5 5   Knee Flexion (S1,2) 4 5   Abdominals 2 2   Paraspinals 4 5   Back Rotators 4 5   Gluteus Niles 4 5   Hip Abduction 4 5                    Current: Abdominal strength improved to 3/5, left hip flexion and knee extension to 4/5     3/2/2020                      Patient will report pain no greater than 2/10 throughout entire day to aid in completion of ADLs.                    Status at IE: 0-9                   Current: Pain can still rise to 9/10 first thing in morning, but is more frequently 0/10 as patient increases frequency of exercises. 3/2/2020                       Patent will be able to return to walking on treadmill one hour/ 3 miles as part of regular exercise regimen.                   Status at IE: currently unable to tolerate treadmill.                   Current: Patient has not yet attempted a return to treadmill, but is planning to return to treadmill in next week as she is noting increased walking tolerance. 3/2/2020                      Patient will improve FOTO (an established functional score where 100 = no disability) score to 63 points to demonstrate improvement in functional status.                  Status at IE: 52                   Current:   In-progress, 57 2/25/2020      PLAN  []  Upgrade activities as tolerated     [x]  Continue plan of care  []  Update interventions per flow sheet       []  Discharge due to:_  []  Other:_      Zander Coma, PT, DPT 3/2/2020  11:09 AM    Future Appointments   Date Time Provider Herbie Johnston   3/5/2020 10:30 AM JANNY Quiroz [facility]   3/10/2020 10:00 AM JANNY Quiroz [facility]   3/12/2020  5:30 PM Cox, Denyse Collet, [place] [facility]   3/16/2020 10:00 AM JANNY Quiroz [facility]   3/18/2020 10:00 AM JANNY Quiroz [facility]   3/23/2020 10:00 AM JANNY Price [facility]   3/25/2020 10:00 AM JANNY Quiroz [facility]   3/30/2020 10:00 AM JANNY Price [facility]

## 2020-03-02 NOTE — PROGRESS NOTES
[facility] at [address] [place] [address] Drive: [phone]   Fax: [phone]  Progress Note  Patient Name: Vinod Grimm : 1955   Medical   Diagnosis: Low back pain [M54.5] Treatment Diagnosis: Low back pain   Onset Date: 2019     Referral Source: Jared Desouza MD Start of Care [facility]): 2020   Prior Hospitalization: See medical history Provider #: [id number]   Prior Level of Function: treadmill 3 miles 3-4 x/wk   Comorbidities: OA, HTN, osteoporosis, colon cancer   Medications: Verified on Patient Summary List      ===========================================================================================  Assessment / Summary of Care:  Vinod Grimm is a 77 y.o.  female who is well motivated and cooperative with PT and is making good progress with PT, noting decreased overall pain intensity and improving strength and activity tolerance. Primary remaining complaint is marked increased pain with prolonged static positions such as sleeping or sitting >1 hour. Addressing this issue through increased frequency of stretching exercises.      Patient will continue to benefit from skilled PT services to modify and progress therapeutic interventions, address functional mobility deficits, address ROM deficits, address strength deficits, analyze and address soft tissue restrictions, analyze and cue movement patterns, analyze and modify body mechanics/ergonomics, assess and modify postural abnormalities,  and instruct in home and community integration to attain remaining goals.    ===========================================================================================    Plan:Continue therapy per initial plan/protocol at a frequency of  2 x per week for 4 weeks    Short Term Goals: To be accomplished in 4 weeks:                   Patient will report compliance with HEP 1x/day to aid in rehabilitation program.                   Status at IE: Patient instructed in and provided written copy of initial Home Exercise Program.                   Current:  Has purchased 100 [place] for HEP. Added further [place] exs to HEP.    2/13/2020                      Patient will increase lumbar ROM to WNL in all planes to aid in completion of ADLs.                  Status at IE:  ROM Degrees Comments:pain, area   Forward flexion  71   Fingertips 7 inches from floor   Extension  23   Increased pain   SB right  28     SB left  45     Rotation right  61     Rotation left  56                      Current: Flexion remains 71 with fingertips 7 inches from floor, extension improve to 31, side bend right improved 34.   2/20/2020     Long Term Goals: To be accomplished in 8 weeks:                   Patient will increase core and left LE strength to 4+/5 MMT throughout to aid in completion of ADLs.                  Status at IE:    L(0-5) R (0-5)   Hip Flexion (L1,2) 3+ 5   Knee Extension (L3,4) 3+ 5   Ankle Plantarflexion (S1) 5 5   Knee Flexion (S1,2) 4 5   Abdominals 2 2   Paraspinals 4 5   Back Rotators 4 5   Gluteus Niles 4 5   Hip Abduction 4 5                    Current: Abdominal strength improved to 3/5, left hip flexion and knee extension to 4/5     3/2/2020                      Patient will report pain no greater than 2/10 throughout entire day to aid in completion of ADLs.                  Status at IE: 0-9                   Current: Pain can still rise to 9/10 first thing in morning, but is more frequently 0/10 as patient increases frequency of exercises. 3/2/2020                       Patent will be able to return to walking on treadmill one hour/ 3 miles as part of regular exercise regimen.                   Status at IE: currently unable to tolerate treadmill.                   Current: Patient has not yet attempted a return to treadmill, but is planning to return to treadmill in next week as she is noting increased walking tolerance.    3/2/2020                      Patient will improve FOTO (an established functional score where 100 = no disability) score to 63 points to demonstrate improvement in functional status.                  Status at IE: 52                   Current:  In-progress, 57 2/25/2020      ===========================================================================================  Subjective: \"It only really hurts first thing in the morning and if I sit still for more than an hour. Doing the exercises more often does help. \"      Therapist Signature: Lana Miranda PT, DPT Date: 5/1/0766   Re-Certification: NA Time: 12:02 PM         [facility] at [address]                    Phone: [phone]   Fax: [phone]  .

## 2020-03-05 ENCOUNTER — APPOINTMENT (OUTPATIENT)
Dept: PHYSICAL THERAPY | Age: 65
End: 2020-03-05
Payer: MEDICARE

## 2020-03-10 ENCOUNTER — HOSPITAL ENCOUNTER (OUTPATIENT)
Dept: PHYSICAL THERAPY | Age: 65
Discharge: HOME | End: 2020-03-10
Payer: MEDICARE

## 2020-03-10 PROCEDURE — 97110 THERAPEUTIC EXERCISES: CPT

## 2020-03-10 NOTE — PROGRESS NOTES
PT DAILY TREATMENT NOTE - [facility]     Patient Name: Linda Davison  Date:3/10/2020  : 1955  [x]  Patient  Verified  Payor: Viniciotimothy Honey / Plan: [place] MEDICARE PART A & B / Product Type: Medicare /    In time:950  Out time:1030  Total Treatment Time (min): 40  Total Timed Codes (min): 40   1:1 Treatment Time ( only): 40   Visit #: 9 of 16    Treatment Area: Low back pain [M54.5]    SUBJECTIVE  Pain Level (0-10 scale): 2  Any medication changes, allergies to medications, adverse drug reactions, diagnosis change, or new procedure performed?: [x] No    [] Yes (see summary sheet for update)  Subjective functional status/changes:   [] No changes reported    Patient reports that she is feeling better as she not longer as an illness. She states that she has LE pain but overall her pain has reduced. OBJECTIVE    40 min Therapeutic Exercise:  [x] See flow sheet :   Rationale: increase ROM, increase strength and decrease pain to improve the patients ability to complete ADLs          With   [x] TE   [] TA   [] neuro   [] other: Patient Education: [x] Review HEP    [] Progressed/Changed HEP based on:   [] positioning   [] body mechanics   [] transfers   [] heat/ice application    [] other:      Other Objective/Functional Measures: NA     Pain Level (0-10 scale) post treatment: 0    ASSESSMENT/Changes in Function:     Patient responds well to treatment session. Patient demonstrated improved autonomy with exercise requiring fewer cues for proper mechanics and exercise pacing.  No adverse effects were noted from today's treatment session    Patient will continue to benefit from skilled PT services to modify and progress therapeutic interventions, address functional mobility deficits, address ROM deficits, address strength deficits, analyze and address soft tissue restrictions, analyze and cue movement patterns, analyze and modify body mechanics/ergonomics, assess and modify postural abnormalities, instruct in home and community integration to attain remaining goals. []  See Plan of Care  []  See progress note/recertification  []  See Discharge Summary         Progress towards goals / Updated goals:  Short Term Goals: To be accomplished in 4 weeks:                   QUOXBJP will report compliance with HEP 1x/day to aid in rehabilitation program.                   Status at IE: Patient instructed in and provided written copy of initial Home Exercise Program.                   Current:  Has purchased 100 [place] for HEP. Added further [place] exs to HEP.    2/13/2020                      Patient will increase lumbar ROM to WNL in all planes to aid in completion of ADLs.                  Status at IE:  ROM Degrees Comments:pain, area   Forward flexion  71   Fingertips 7 inches from floor   Extension  23   Increased pain   SB right  28     SB left  45     Rotation right  61     Rotation left  56                      Current: Flexion remains 71 with fingertips 7 inches from floor, extension improve to 31, side bend right improved 34.   2/20/2020     Long Term Goals: To be accomplished in 8 weeks:                   Patient will increase core and left LE strength to 4+/5 MMT throughout to aid in completion of ADLs.                  Status at IE:    L(0-5) R (0-5)   Hip Flexion (L1,2) 3+ 5   Knee Extension (L3,4) 3+ 5   Ankle Plantarflexion (S1) 5 5   Knee Flexion (S1,2) 4 5   Abdominals 2 2   Paraspinals 4 5   Back Rotators 4 5   Gluteus Niles 4 5   Hip Abduction 4 5                    Current: Abdominal strength improved to 3/5, left hip flexion and knee extension to 4/5     3/2/2020                      Patient will report pain no greater than 2/10 throughout entire day to aid in completion of ADLs.                    Status at IE: 0-9                   Current: Pain can still rise to 9/10 first thing in morning, but is more frequently 0/10 as patient increases frequency of exercises.  3/2/2020                       Patent will be able to return to walking on treadmill one hour/ 3 miles as part of regular exercise regimen.                   Status at IE: currently unable to tolerate treadmill.                   Current: Patient has not yet attempted a return to treadmill, but is planning to return to treadmill in next week as she is noting increased walking tolerance.   3/2/2020                      Patient will improve FOTO (an established functional score where 100 = no disability) score to 63 points to demonstrate improvement in functional status.                    Status at IE: 52                   Current:  In-progress, 57 2/25/2020  PLAN  []  Upgrade activities as tolerated     [x]  Continue plan of care  []  Update interventions per flow sheet       []  Discharge due to:_  []  Other:_      Leigha Grover, PT, DPT 3/10/2020  9:50 AM    Future Appointments   Date Time Provider Herbie Johnston   3/10/2020 10:00 AM Sarahi Jackson [facility]   3/12/2020  5:30 PM Yousif Pierre [facility]   3/16/2020 10:00 AM Sarahi Jackson PT [facility]   3/18/2020 10:00 AM Sarahi Jackson, PT [facility]   3/23/2020 10:00 AM Nuvia Person PT [facility]   3/25/2020 10:00 AM Sarahi Jackson PT [facility]   3/30/2020 10:00 AM Nuvia Person PT [facility]

## 2020-03-12 ENCOUNTER — HOSPITAL ENCOUNTER (OUTPATIENT)
Dept: PHYSICAL THERAPY | Age: 65
Discharge: HOME | End: 2020-03-12
Payer: MEDICARE

## 2020-03-12 PROCEDURE — 97110 THERAPEUTIC EXERCISES: CPT

## 2020-03-12 NOTE — PROGRESS NOTES
PT DAILY TREATMENT NOTE    Patient Name: Ronit Peterson  Date:3/12/2020  : 1955  [x]  Patient  Verified  Payor: Augustine Strauss / Plan: [place] MEDICARE PART A & B / Product Type: Medicare /    In time: 520  Out time: 605  Total Treatment Time (min): 45  Total Timed Codes (min): 45  1:1 Treatment Time ( only): 38   Visit #: 10 of 16    Treatment Area: Low back pain [M54.5]    SUBJECTIVE  Pain Level (0-10 scale): 0  Any medication changes, allergies to medications, adverse drug reactions, diagnosis change, or new procedure performed?: [x] No    [] Yes (see summary sheet for update)  Subjective functional status/changes:   [] No changes reported  \"I still get pain in the morning, but it does lessen if I do the stretches you taught me. \"    OBJECTIVE    38 min Therapeutic Exercise:  [x] See flow sheet :   Rationale: increase ROM, increase strength and decrease pain to improve the patients ability to complete ADLs  ambulation safety and efficiency in order to improve patient's ability to safely ambulate at home for self care. With   [] TE   [] TA   [] neuro   [] other: Patient Education: [x] Review HEP    [] Progressed/Changed HEP based on:   [] positioning   [] body mechanics   [] transfers   [] heat/ice application    [] other:      Other Objective/Functional Measures: NA     Pain Level (0-10 scale) post treatment: 0    ASSESSMENT/Changes in Function: Patient noting reduction in morning pain as she becomes more compliant with HEP. Added treadmill instruction as patient requests guidance in returning to treadmill exercising. Patient responds well to treatment session. No adverse effects were noted from today's treatment session.      Patient will continue to benefit from skilled PT services to modify and progress therapeutic interventions, address functional mobility deficits, address ROM deficits, address strength deficits, analyze and address soft tissue restrictions, analyze and cue movement patterns, analyze and modify body mechanics/ergonomics, assess and modify postural abnormalities,  and instruct in home and community integration to attain remaining goals. []  See Plan of Care  []  See progress note/recertification  []  See Discharge Summary         Progress towards goals / Updated goals:  Short Term Goals: To be accomplished in 4 weeks:                   NDCZPJS will report compliance with HEP 1x/day to aid in rehabilitation program.                   Status at IE: Patient instructed in and provided written copy of initial Home Exercise Program.                   Current:  Has purchased 100 [employer] for HEP. Added further [place] exs to HEP.    2/13/2020                      Patient will increase lumbar ROM to WNL in all planes to aid in completion of ADLs.                  Status at IE:  ROM Degrees Comments:pain, area   Forward flexion  71   Fingertips 7 inches from floor   Extension  23   Increased pain   SB right  28     SB left  45     Rotation right  61     Rotation left  56                      Current: Flexion improved to 80 degrees with fingertips 3 inches from floor.   3/12/2020     Long Term Goals: To be accomplished in 8 weeks:                   Patient will increase core and left LE strength to 4+/5 MMT throughout to aid in completion of ADLs.                  Status at IE:    L(0-5) R (0-5)   Hip Flexion (L1,2) 3+ 5   Knee Extension (L3,4) 3+ 5   Ankle Plantarflexion (S1) 5 5   Knee Flexion (S1,2) 4 5   Abdominals 2 2   Paraspinals 4 5   Back Rotators 4 5   Gluteus Niles 4 5   Hip Abduction 4 5                    Current: Abdominal strength improved to 3/5, left hip flexion and knee extension to 4/5     3/2/2020                      Patient will report pain no greater than 2/10 throughout entire day to aid in completion of ADLs.                    Status at IE: 0-9                   Current: Pain can still rise to 9/10 first thing in morning, but is more frequently 0/10 as patient increases frequency of exercises.  3/2/2020                       Patent will be able to return to walking on treadmill one hour/ 3 miles as part of regular exercise regimen.                   Status at IE: currently unable to tolerate treadmill.                   Current: Patient has not yet attempted a return to treadmill, but is planning to return to treadmill in next week as she is noting increased walking tolerance.   3/2/2020                      Patient will improve FOTO (an established functional score where 100 = no disability) score to 63 points to demonstrate improvement in functional status.                    Status at IE: 52                   Current:  In-progress, 57 2/25/2020      PLAN  []  Upgrade activities as tolerated     [x]  Continue plan of care  []  Update interventions per flow sheet       []  Discharge due to:_  []  Other:_      Harjit Prasad, PT, DPT 3/12/2020  5:24 PM    Future Appointments   Date Time Provider Herbie Johnston   3/12/2020  5:30 PM [facility]   3/16/2020 10:00 AM Paul Stage, PT [facility]   3/18/2020 10:00 AM Paul Stage, PT [facility]   3/23/2020 10:00 AM Glenbrook Comes, PT [facility]   3/25/2020 10:00 AM Paul Stage, PT [facility]   3/30/2020 10:00 AM Glenbrook Comes, PT [facility]

## 2020-03-16 ENCOUNTER — HOSPITAL ENCOUNTER (OUTPATIENT)
Dept: PHYSICAL THERAPY | Age: 65
Discharge: HOME | End: 2020-03-16
Payer: MEDICARE

## 2020-03-16 PROCEDURE — 97110 THERAPEUTIC EXERCISES: CPT

## 2020-03-16 NOTE — PROGRESS NOTES
PT DAILY TREATMENT NOTE - [facility]     Patient Name: Aniyah Colvin  Date:3/16/2020  : 1955  [x]  Patient  Verified  Payor: Edita Dear / Plan: [place] / Product Type: Medicare /    In time:1000  Out time:1040  Total Treatment Time (min): 40  Total Timed Codes (min): 40   1:1 Treatment Time ( [address] only): 30   Visit #: 11 of 16    Treatment Area: Low back pain [M54.5]    SUBJECTIVE  Pain Level (0-10 scale): 3  Any medication changes, allergies to medications, adverse drug reactions, diagnosis change, or new procedure performed?: [x] No    [] Yes (see summary sheet for update)  Subjective functional status/changes:   [] No changes reported  Patient reports no new changes since last visit. Reports that she is performing swiss ball activities at home. OBJECTIVE    30 min Therapeutic Exercise:  [x] See flow sheet :   Rationale: increase ROM, increase strength and decrease pain to improve the patients ability to complete ADLs          With   [x] TE   [] TA   [] neuro   [] other: Patient Education: [x] Review HEP    [] Progressed/Changed HEP based on:   [] positioning   [] body mechanics   [] transfers   [] heat/ice application    [] other:      Other Objective/Functional Measures: NA     Pain Level (0-10 scale) post treatment: 0 back 3 knee    ASSESSMENT/Changes in Function: Patient responds well to treatment session. Patient required cues to recall promote mechanics with swiss ball exercises. . No adverse effects were noted from today's treatment session      Patient will continue to benefit from skilled PT services to modify and progress therapeutic interventions, address functional mobility deficits, address ROM deficits, address strength deficits, analyze and address soft tissue restrictions, analyze and cue movement patterns, analyze and modify body mechanics/ergonomics, assess and modify postural abnormalities, and instruct in home and community integration to attain remaining goals.       [] See Plan of Care  []  See progress note/recertification  []  See Discharge Summary         Progress towards goals / Updated goals:  Short Term Goals: To be accomplished in 4 weeks:                   HDHZNET will report compliance with HEP 1x/day to aid in rehabilitation program.                   Status at IE: Patient instructed in and provided written copy of initial Home Exercise Program.                   Current:  Met 3/16/2020                     Patient will increase lumbar ROM to WNL in all planes to aid in completion of ADLs.                  Status at IE:  ROM Degrees Comments:pain, area   Forward flexion  71   Fingertips 7 inches from floor   Extension  23   Increased pain   SB right  28     SB left  45     Rotation right  61     Rotation left  56                      Current: Flexion improved to 80 degrees with fingertips 3 inches from floor.   3/12/2020     Long Term Goals: To be accomplished in 8 weeks:                   Patient will increase core and left LE strength to 4+/5 MMT throughout to aid in completion of ADLs.                  Status at IE:    L(0-5) R (0-5)   Hip Flexion (L1,2) 3+ 5   Knee Extension (L3,4) 3+ 5   Ankle Plantarflexion (S1) 5 5   Knee Flexion (S1,2) 4 5   Abdominals 2 2   Paraspinals 4 5   Back Rotators 4 5   Gluteus Niles 4 5   Hip Abduction 4 5                    Current: Abdominal strength improved to 3/5, left hip flexion and knee extension to 4/5     3/2/2020                      Patient will report pain no greater than 2/10 throughout entire day to aid in completion of ADLs.                    Status at IE: 0-9                   Current: Pain can still rise to 9/10 first thing in morning, but is more frequently 0/10 as patient increases frequency of exercises.  3/2/2020                       Patent will be able to return to walking on treadmill one hour/ 3 miles as part of regular exercise regimen.                   Status at IE: currently unable to tolerate treadmill.                   Current: Patient has not yet attempted a return to treadmill, but is planning to return to treadmill in next week as she is noting increased walking tolerance.   3/2/2020                      Patient will improve FOTO (an established functional score where 100 = no disability) score to 63 points to demonstrate improvement in functional status.                    Status at IE: 52                   Current:  In-progress, 57 2/25/2020    PLAN  []  Upgrade activities as tolerated     [x]  Continue plan of care  []  Update interventions per flow sheet       []  Discharge due to:_  []  Other:_      Leia Ordonez, PT, DPT 3/16/2020  10:09 AM    Future Appointments   Date Time Provider Herbie Johnston   3/18/2020 10:00 AM JANNY Villar [facility]   3/23/2020 10:00 AM JANNY Chan [facility]   3/25/2020 10:00 AM JANNY Villar [facility]   3/30/2020 10:00 AM Aditi Richards PT ELSA [facility]

## 2020-03-18 ENCOUNTER — HOSPITAL ENCOUNTER (OUTPATIENT)
Dept: PHYSICAL THERAPY | Age: 65
Discharge: HOME | End: 2020-03-18
Payer: MEDICARE

## 2020-03-18 PROCEDURE — 97110 THERAPEUTIC EXERCISES: CPT

## 2020-03-18 NOTE — PROGRESS NOTES
PT DAILY TREATMENT NOTE - [facility]     Patient Name: Fahad Harden  Date:3/18/2020  : 1955  [x]  Patient  Verified  Payor: Dina Enriquez / Plan: [place] MED[place]ARE PART A & B / Product Type: Medicare /    In time:1000  Out time:1040  Total Treatment Time (min): 40  Total Timed Codes (min): 40   1:1 Treatment Time ( only): 38  Visit #: 12 of 16    Treatment Area: Low back pain [M54.5]    SUBJECTIVE  Pain Level (0-10 scale): 0  Any medication changes, allergies to medications, adverse drug reactions, diagnosis change, or new procedure performed?: [x] No    [] Yes (see summary sheet for update)  Subjective functional status/changes:   [] No changes reported  Patient reports that she is doing well and has no new complaints. OBJECTIVE    38 min Therapeutic Exercise:  [x] See flow sheet :   Rationale: increase ROM, increase strength and decrease pain to improve the patients ability to complete ADLs          With   [x] TE   [] TA   [] neuro   [] other: Patient Education: [x] Review HEP    [] Progressed/Changed HEP based on:   [] positioning   [] body mechanics   [] transfers   [] heat/ice application    [] other:      Other Objective/Functional Measures: NA     Pain Level (0-10 scale) post treatment: 0    ASSESSMENT/Changes in Function:   Patient responds well to treatment session. Patient demonstrated improved autonomy with exercise requiring minimal cues for form and activity pacing. She continues to be challenged with bridge activities. No adverse effects were noted from today's treatment session.     Patient will continue to benefit from skilled PT services to modify and progress therapeutic interventions, address functional mobility deficits, address ROM deficits, address strength deficits, analyze and address soft tissue restrictions, analyze and cue movement patterns, analyze and modify body mechanics/ergonomics, assess and modify postural abnormalities, address imbalance/dizziness and instruct in home and community integration to attain remaining goals. []  See Plan of Care  []  See progress note/recertification  []  See Discharge Summary         Progress towards goals / Updated goals:  Short Term Goals: To be accomplished in 4 weeks:                   BTQSIZI will report compliance with HEP 1x/day to aid in rehabilitation program.                   Status at IE: Patient instructed in and provided written copy of initial Home Exercise Program.                   Current:  Met 3/16/2020                      Patient will increase lumbar ROM to WNL in all planes to aid in completion of ADLs.                  Status at IE:  ROM Degrees Comments:pain, area   Forward flexion  71   Fingertips 7 inches from floor   Extension  23   Increased pain   SB right  28     SB left  45     Rotation right  61     Rotation left  56                      Current: Flexion improved to 80 degrees with fingertips 3 inches from floor.   3/12/2020     Long Term Goals: To be accomplished in 8 weeks:                   Patient will increase core and left LE strength to 4+/5 MMT throughout to aid in completion of ADLs.                  Status at IE:    L(0-5) R (0-5)   Hip Flexion (L1,2) 3+ 5   Knee Extension (L3,4) 3+ 5   Ankle Plantarflexion (S1) 5 5   Knee Flexion (S1,2) 4 5   Abdominals 2 2   Paraspinals 4 5   Back Rotators 4 5   Gluteus Niles 4 5   Hip Abduction 4 5                    Current: Abdominal strength improved to 3/5, left hip flexion and knee extension to 4/5     3/2/2020                      Patient will report pain no greater than 2/10 throughout entire day to aid in completion of ADLs.                    Status at IE: 0-9                   Current: Pain can still rise to 9/10 first thing in morning, but is more frequently 0/10 as patient increases frequency of exercises.  3/2/2020                       Patent will be able to return to walking on treadmill one hour/ 3 miles as part of regular exercise regimen.                   Status at IE: currently unable to tolerate treadmill.                   Current: Patient has not yet attempted a return to treadmill, but is planning to return to treadmill in next week as she is noting increased walking tolerance.   3/2/2020                      Patient will improve FOTO (an established functional score where 100 = no disability) score to 63 points to demonstrate improvement in functional status.                    Status at IE: 52                   Current:  In-progress, 57 2/25/2020    PLAN  []  Upgrade activities as tolerated     [x]  Continue plan of care  []  Update interventions per flow sheet       []  Discharge due to:_  []  Other:_      Leigha Grover, PT, DPT 3/18/2020  10:26 AM    Future Appointments   Date Time Provider Herbie Johnston   3/23/2020 10:00 AM JANNY Coombs [facility]   3/25/2020 10:00 AM JANNY Blankenship [facility]   3/30/2020 10:00 AM JANNY Coombs [facility]

## 2020-03-23 ENCOUNTER — APPOINTMENT (OUTPATIENT)
Dept: PHYSICAL THERAPY | Age: 65
End: 2020-03-23
Payer: MEDICARE

## 2020-03-25 ENCOUNTER — APPOINTMENT (OUTPATIENT)
Dept: PHYSICAL THERAPY | Age: 65
End: 2020-03-25
Payer: MEDICARE

## 2020-03-30 ENCOUNTER — APPOINTMENT (OUTPATIENT)
Dept: PHYSICAL THERAPY | Age: 65
End: 2020-03-30
Payer: MEDICARE

## 2020-03-30 NOTE — PROGRESS NOTES
[facility] at [address] [place] [address] Drive: [phone]   Fax: [phone]  Progress Note  Patient Name: Andreea Hamilton : 1955   Medical   Diagnosis: Low back pain [M54.5] Treatment Diagnosis: Low Back Pain   Onset Date: 2020     Referral Source: Karina Meyer MD [facility]): 2020   Prior Hospitalization: See medical history Provider #: [id number]   Prior Level of Function: treadmill 3 miles 3-4 x/wk   Comorbidities: OA, HTN, osteoporosis, colon cancer   Medications: Verified on Patient Summary List      ===========================================================================================  Assessment / Summary of Care:  Andreea Hamilton is a 77 y.o.  female s/p L3-4 discectomy performed 19 . Pt has been making good progress towards goals however is to be placed on hold at this time due public health concerns for [facility]. Pt has been given an updated HEP with option of PT sending out an updated HEP via e-mail if needed to stay in communication with therapist. If pt's symptoms return and are unable to be managed with exercises at home pt educated to follow-up with therapist to be taken off hold as appropriate. Patient care will resume to previous established frequency when public health crisis has subsided.     ===========================================================================================    Plan: Patient care will resume to previous established frequency when public health crisis has subsided.    Continue therapy per initial plan/protocol at a frequency of  2 x per week for 4 weeks    Goals:   Short Term Goals: To be accomplished in 4 weeks:                   SGXSATZ will report compliance with HEP 1x/day to aid in rehabilitation program.                   Status at IE: Patient instructed in and provided written copy of initial Home Exercise Program.                   Current:  Met 3/16/2020                      Patient will increase lumbar ROM to WNL in all planes to aid in completion of ADLs.                  Status at IE:  ROM Degrees Comments:pain, area   Forward flexion  71   Fingertips 7 inches from floor   Extension  23   Increased pain   SB right  28     SB left  45     Rotation right  61     Rotation left  56                      Current: Flexion improved to 80 degrees with fingertips 3 inches from floor.   3/12/2020     Long Term Goals: To be accomplished in 8 weeks:                   Patient will increase core and left LE strength to 4+/5 MMT throughout to aid in completion of ADLs.                  Status at IE:    L(0-5) R (0-5)   Hip Flexion (L1,2) 3+ 5   Knee Extension (L3,4) 3+ 5   Ankle Plantarflexion (S1) 5 5   Knee Flexion (S1,2) 4 5   Abdominals 2 2   Paraspinals 4 5   Back Rotators 4 5   Gluteus Niles 4 5   Hip Abduction 4 5                    Current: Abdominal strength improved to 3/5, left hip flexion and knee extension to 4/5     3/2/2020                      Patient will report pain no greater than 2/10 throughout entire day to aid in completion of ADLs.                  Status at IE: 0-9                   Current: Pain can still rise to 9/10 first thing in morning, but is more frequently 0/10 as patient increases frequency of exercises.  3/2/2020                       Patent will be able to return to walking on treadmill one hour/ 3 miles as part of regular exercise regimen.                   Status at IE: currently unable to tolerate treadmill.                   Current: Patient has not yet attempted a return to treadmill, but is planning to return to treadmill in next week as she is noting increased walking tolerance.   3/2/2020                      Patient will improve FOTO (an established functional score where 100 = no disability) score to 63 points to demonstrate improvement in functional status.                    Status at IE: 52                   Current:  In-progress, 57 2/25/2020      ===========================================================================================  Subjective: NA      Objective: NA    Therapist Signature: Marisel Yun PT, DPT Date: 1/26/9101   Re-Certification:  Time: 7:54 PM     I certify that the above Therapy Services are being furnished while the patient is under my care. I agree with the treatment plan and certify that this therapy is necessary. [facility] at [address]         Phone: [phone]   Fax: [phone]  .

## 2020-06-30 ENCOUNTER — HOSPITAL ENCOUNTER (OUTPATIENT)
Dept: GENERAL RADIOLOGY | Age: 65
Discharge: HOME | End: 2020-06-30
Payer: MEDICARE

## 2020-06-30 DIAGNOSIS — M79.672 LEFT FOOT PAIN: ICD-10-CM

## 2020-06-30 PROCEDURE — 73630 X-RAY EXAM OF FOOT: CPT

## 2020-10-05 NOTE — PROGRESS NOTES
[facility] at [address] [place] [address] Drive: [phone]   Fax: [phone]  Discharge Summary  Patient Name: Surya Barriga : 1955   Medical   Diagnosis: Low back pain [M54.5] Treatment Diagnosis: Low back pain   Onset Date: 2020     Referral Source: Juancarlos Hutson MD [facility]): 2020   Prior Hospitalization: See medical history Provider #: [id number]   Prior Level of Function: treadmill 3 miles 3-4 x/wk   Comorbidities: OA, HTN, osteoporosis, colon cancer   Medications: Verified on Patient Summary List      ===========================================================================================  Assessment / Summary of Care:  Surya Barriga is a 77 y.o.  yo female who was referred for Physical Therapy services at our location. Pt attended twelve Physical Therapy sessions through mid-2020 and was placed on hold due public health concerns for [address] [facility]. Plan was for patient to contact clinic to re-start PT once coronavirus lockdown was lifted. Patient has not contacted clinic with request to continue PT, so is now discharged from PT services. Pt was provided an updated HEP prior to being put on hold due to coronavirus. Below is patient progress towards Physical Therapy goals at last attended visit.    ===========================================================================================    Plan: Discharge to independent HEP. Goals:   Short Term Goals: To be accomplished in 4 weeks:                   HBWCHHQ will report compliance with HEP 1x/day to aid in rehabilitation program.                   Status at IE: Patient instructed in and provided written copy of initial Home Exercise Program.                   Current:  Met 3/16/2020                      Patient will increase lumbar ROM to WNL in all planes to aid in completion of ADLs.                    Status at IE:  ROM Degrees Comments:pain, area   Forward flexion  71   Fingertips 7 inches from floor   Extension  23   Increased pain   SB right  28     SB left  45     Rotation right  61     Rotation left  56                      Current: Flexion improved to 80 degrees with fingertips 3 inches from floor.   3/12/2020     Long Term Goals: To be accomplished in 8 weeks:                   YTNLIZASY will increase core and left LE strength to 4+/5 MMT throughout to aid in completion of ADLs.                  Status at IE:    L(0-5) R (0-5)   Hip Flexion (L1,2) 3+ 5   Knee Extension (L3,4) 3+ 5   Ankle Plantarflexion (S1) 5 5   Knee Flexion (S1,2) 4 5   Abdominals 2 2   Paraspinals 4 5   Back Rotators 4 5   Gluteus Niles 4 5   Hip Abduction 4 5                    Current: Abdominal strength improved to 3/5, left hip flexion and knee extension to 4/5     3/2/2020                      Patient will report pain no greater than 2/10 throughout entire day to aid in completion of ADLs.                  Status at IE: 0-9                   Current: Pain can still rise to 9/10 first thing in morning, but is more frequently 0/10 as patient increases frequency of exercises.  3/2/2020                       Patent will be able to return to walking on treadmill one hour/ 3 miles as part of regular exercise regimen.                   Status at IE: currently unable to tolerate treadmill.                   Current: Patient has not yet attempted a return to treadmill, but is planning to return to treadmill in next week as she is noting increased walking tolerance.   3/2/2020                      Patient will improve FOTO (an established functional score where 100 = no disability) score to 63 points to demonstrate improvement in functional status.                    Status at IE: 46                   Current:  In-progress, 57 2/25/2020      ===========================================================================================    Therapist Signature: Georgiana Martinez PT, DPT Date: 10/5/2020     Time: 2:19 PM

## 2021-02-09 ENCOUNTER — HOSPITAL ENCOUNTER (OUTPATIENT)
Dept: PHYSICAL THERAPY | Age: 66
Discharge: HOME | End: 2021-02-09
Payer: MEDICARE

## 2021-02-09 PROCEDURE — 97161 PT EVAL LOW COMPLEX 20 MIN: CPT

## 2021-02-09 PROCEDURE — 97110 THERAPEUTIC EXERCISES: CPT

## 2021-02-09 NOTE — PROGRESS NOTES
[facility] at [address] [place] [address] Drive: [phone]   Fax: [phone]  PLAN OF CARE / [address] [place] [address] PHYSICAL THERAPY SERVICES  Patient Name: Yancey Goltz : 1955   Medical   Diagnosis: Low back pain [M54.5] Treatment Diagnosis: Low back pain   Onset Date: 2020     Referral Source: Melissa Nesbitt MD Start of Care [facility]): 2021   Prior Hospitalization: See medical history Provider #: [id number]   Prior Level of Function: treadmill 3 miles 3-4 x/wk   Comorbidities: OA, HTN, osteoporosis, colon cancer. Surgical Hx: L3-4 discectomy 2019, colon resection, bariatric surgery   Medications: Verified on Patient Summary List   The Plan of Care and following information is based on the information from the initial evaluation.   ===========================================================================================  Assessment / key information:  Yancey Goltz is a 77 y.o.  female who presents with  signs/symptoms consistent with sub acute right SI joint sprain. Patient exhibits marked limitation in stand/walk tolerance, intermittent sharp pain right SI and associated intermittent antalgic gait. Patient also exhibits decreased core/abdominal and LE strength.     Patient will continue to benefit from skilled PT services to modify and progress therapeutic interventions, address functional mobility deficits, address ROM deficits, address strength deficits, analyze and address soft tissue restrictions, analyze and cue movement patterns, analyze and modify body mechanics/ergonomics and assess and modify postural abnormalities to attain remaining goals.   Pt instructed in [facility] and will f/u in clinic for PT.  ===========================================================================================  Eval Complexity: History MEDIUM  Complexity : 1-2 comorbidities / personal factors will impact the outcome/ POC ;  Examination LOW Complexity : 1-2 Standardized tests and measures addressing body structure, function, activity limitation and / or participation in recreation ; Presentation LOW Complexity : Stable, uncomplicated ;  Decision Making MEDIUM Complexity : FOTO score of 26-74; : FOTO score = an established functional score where 100 = no disability  Overall Complexity LOW   Problem List: pain affecting function, decrease ROM, decrease strength, impaired gait/ balance, decrease ADL/ functional abilitiies, decrease activity tolerance and decrease flexibility/ joint mobility   Treatment Plan may include any combination of the following: Therapeutic exercise, Therapeutic activities, Neuromuscular re-education, Physical agent/modality, Gait/balance training, Manual therapy, Aquatic therapy, Patient education, Self Care training, Functional mobility training and Home safety training  Patient / Family readiness to learn indicated by: asking questions, trying to perform skills and interest  Persons(s) to be included in education: patient (P)  Barriers to Learning/Limitations: no  Measures Taken: NA  Patient Goal (s): \"I want to get back to my walking program without pain. \"   Patient self reported health status: good  Rehabilitation Potential: good  Goals:  Short Term Goals: To be accomplished in 4 weeks:   Patient will report compliance with HEP 1x/day to aid in rehabilitation program.   Status at IE: Patient instructed in and provided written copy of initial Home Exercise Program.   Current: Same as IE      Patient will increase lumbar ROM to WNL in all planes to aid in completion of ADLs. Status at IE: flexion fingertips 5 inches from floor. Current: Same as IE    Long Term Goals: To be accomplished in 8 weeks:   Patient will increase core/abdominal and  B LE strength to 4+/5 MMT throughout to aid in completion of ADLs. Status at IE: core/abdominal 3, right LE 3+, left LE 4.    Current: Same as IE     Patient will report pain no greater than 0-2/10 throughout entire day to aid in completion of ADLs. Status at IE:    Current: Same as IE     Patent will improve stand/walk tolerance to >30 minutes to be able to complete ADLs. Status at IE: stand/walk tolerance <10 minutes. Current: Same as IE     Patient will improve FOTO (an established functional score where 100 = no disability) score to 72 points to demonstrate improvement in functional status. Status at IE:60   Current: Same as IE        Frequency / Duration:   Patient to be seen 2  times per week for 8  weeks:  Patient / Caregiver education and instruction: self care and exercises      . Therapist Signature: Carmelo Montiel DPT Date: 1/9/5055   Certification Period: 2/9/2021 to 5/5/2021 Time: 2:39 PM   ===========================================================================================  I certify that the above Physical Therapy Services are being furnished while the patient is under my care. I agree with the treatment plan and certify that this therapy is necessary. Physician Signature:        Date:       Time:     Please sign and return to [facility] or you may fax the signed copy to [phone]. Thank you.

## 2021-02-09 NOTE — PROGRESS NOTES
PT DAILY TREATMENT NOTE/LUMBAR EVAL 10-18    Patient Name: Layo Jimenez  Date:2021  : 1955  [x]  Patient  Verified  Payor: Mauricio Kulkarni / Plan: [place] MED[place]ARE PART [place] & B / Product Type: Medicare /    In time:1:47  Out time:2:29  Total Treatment Time (min): 25  Visit #: 1 of 16    Medicare/BCBS Only   Total Timed Codes (min):  25 1:1 Treatment Time:  25     Treatment Area: Low back pain [M54.5]  SUBJECTIVE  Pain Level (0-10 scale): 1-8 range (current 1)  []constant [x]intermittent []improving []worsening []no change since onset    Any medication changes, allergies to medications, adverse drug reactions, diagnosis change, or new procedure performed?: [x] No    [] Yes (see summary sheet for update)  Subjective functional status/changes:     Patient has c/c of lumbar since second lumbar surgery 2020. Patient then underwent abdominal hernia surgery. Patient reports she noted new onset right SI joint area pain about two months ago with no apparent reason for onset. Patient describes pain as intermittent sharp pain. Pain is worse in PM. Denies numbness/tingling. Reports intermittent popping/clicking right SI joint. Aggravating factors: long term standing or sitting. Alleviating factors: rest in supine. Denies red flags: SOB, chest pain, dizziness/lightheadedness, blurred/double vision, HA, chills/fevers, night sweats, change in bowel/bladder control, abdominal pain, difficulty swallowing, slurred speech, unexplained weight gain/loss, nausea, vomiting. PMHx: OA, HTN, osteoporosis, colon cancer. Surgical Hx: L3-4 discectomy 2019, colon resection, bariatric surgery. Social Hx: , two level home, social alcohol, no tobacco, works 30 hours per week as . PLOF: treadmill 3 miles 3-4 x/wk. CLOF: unable to participate in cardiovascular exercise at present, stand/walk tolerance <10 minutes. Diagnostic Imaging: none recent.     OBJECTIVE/EXAMINATION    Precautions: abdominal hernia repair    17 min [x]Eval                  []Re-Eval       25 min Therapeutic Exercise:  [] See flow sheet :   Rationale: increase ROM, increase strength and decrease pain to improve the patients ability to complete ADLs          With   [] TE   [] TA   [] neuro   [] other: Patient Education: [x] Review HEP    [] Progressed/Changed HEP based on:   [] positioning   [] body mechanics   [] transfers   [] heat/ice application    [] other:      Physical Therapy Evaluation - Lumbar Spine    OBJECTIVE  Posture:  Lateral Shift: [] R    [x] L     [x] +  [] -  Kyphosis: [] Increased [] Decreased   []  WNL  Lordosis:  [] Increased [x] Decreased   [] WNL  Pelvic symmetry: [] WNL    [x] Other: Right SI anterior    Gait:  [] Normal     [x] Abnormal: Patient reports intermittent antalgic gait and difficulty ambulating stairs when pain is present. Active Movements: [] N/A   [] Too acute   [] Other:  ROM Degrees Comments:pain, area   Forward flexion     Fingertips 5 inches from floor   Extension  30   Increased pain   SB right  33    SB left  37    Rotation right  53    Rotation left  55      Repeated Movements: Extension increased pain  Side Glide:  Sustained passive positioning test:    Neuro Screen [x] WNL  Myotome/Dermatome/Reflexes:  Comments:    Dural Mobility:  SLR Supine: [] R    [] L    [] +    [x] -  @ (degrees):   Slump Test: [] R    [] L    [] +    [x] -  @ (degrees):   Prone Knee Bend: [] R    [] L    [] +    [x] -     Palpation  [] Min  [x] Mod  [] Severe    Location: right inferior SI joint  [] Min  [] Mod  [] Severe    Location:    Strength   R(0-5) L(0-5) N/T   Hip Flexion (L1,2) 3+ 4 []   Knee Extension (L3,4) 3+ 4 []   Ankle Dorsiflexion (L4) 5 5 []   Great Toe Extension (L5) 5 5 []   Ankle Plantarflexion (S1) 5 5 []   Knee Flexion (S1,2) 3+ 4 []   Abdominals 3 3 []   Paraspinals 3+ 4 []   Back Rotators 3+ 4 []   Gluteus Niles 3+ 4 []   Hip Abduction 3+ 4 []     Special Tests  Lumbar:  Lumb. Compression: [] Pos  [x] Neg               Lumbar Distraction:   [] Pos  [x] Neg    Quadrant:  [x] Pos  [] Neg   [] Flex  [] Ext    Sacroilliac:  Gaenslen's: [] R    [] L    [x] +    [] -     Compression: [x] +    [] -     Gapping:  [] +    [x] -            Hip: Drucella Squire:  [] R    [] L    [] +    [x] -     Scour:  [] R    [] L    [] +    [x] -     Piriformis: [] R    [] L    [] +    [x] -     Other tests/comments:       Pain Level (0-10 scale) post treatment: 1    ASSESSMENT/Changes in Function: Patient presents with signs/symptoms consistent with sub acute right SI joint sprain. Patient exhibits marked limitation in stand/walk tolerance, intermittent sharp pain right SI and associated intermittent antalgic gait. Patient also exhibits decreased core/abdominal and LE strength. Patient will continue to benefit from skilled PT services to modify and progress therapeutic interventions, address functional mobility deficits, address ROM deficits, address strength deficits, analyze and address soft tissue restrictions, analyze and cue movement patterns, analyze and modify body mechanics/ergonomics and assess and modify postural abnormalities to attain remaining goals.      [x]  See Plan of Care  []  See progress note/recertification  []  See Discharge Summary         Progress towards goals / Updated goals:  See POC    PLAN  []  Upgrade activities as tolerated     [x]  Continue plan of care  []  Update interventions per flow sheet       []  Discharge due to:_  []  Other:_      Patricia Mohan, PT 2/9/2021  1:49 PM      \

## 2021-02-11 ENCOUNTER — HOSPITAL ENCOUNTER (OUTPATIENT)
Dept: PHYSICAL THERAPY | Age: 66
Discharge: HOME | End: 2021-02-11
Payer: MEDICARE

## 2021-02-11 PROCEDURE — 97110 THERAPEUTIC EXERCISES: CPT

## 2021-02-11 NOTE — PROGRESS NOTES
PT DAILY TREATMENT NOTE - [facility]     Patient Name: Suzanne Delvalle  Date:2021  : 1955  [x]  Patient  Verified  Payor: Mery Parr / Plan: [place] MEDICARE PART A & B / Product Type: Medicare /    In time:1603  Out time:1641  Total Treatment Time (min): 38  Total Timed Codes (min): 38   1:1 Treatment Time ( only): 45   Visit #: 2 of 16    Treatment Area: Low back pain [M54.5]    SUBJECTIVE  Pain Level (0-10 scale): 0  Any medication changes, allergies to medications, adverse drug reactions, diagnosis change, or new procedure performed?: [x] No    [] Yes (see summary sheet for update)  Subjective functional status/changes:   [] No changes reported    Patient reports that she was sore after last visit but feels good today. OBJECTIVE    38 min Therapeutic Exercise:  [] See flow sheet :   Rationale: increase ROM, increase strength and decrease pain to improve the patients ability to complete ADLs          With   [x] TE   [] TA   [] neuro   [] other: Patient Education: [x] Review HEP    [] Progressed/Changed HEP based on:   [] positioning   [] body mechanics   [] transfers   [] heat/ice application    [] other:      Other Objective/Functional Measures: NA     Pain Level (0-10 scale) post treatment: 0    ASSESSMENT/Changes in Function: Patient responds well to treatment session. Patient required cues for activity pacing to promote proper form as she was rushing through exercise resulting in compensatory strategies. Reviewed HEP to promote good carryover at home.  No adverse effects were noted from today's treatment session      Patient will continue to benefit from skilled PT services to modify and progress therapeutic interventions, address functional mobility deficits, address ROM deficits, address strength deficits, analyze and address soft tissue restrictions, analyze and cue movement patterns, analyze and modify body mechanics/ergonomics, assess and modify postural abnormalities, and instruct in home and community integration to attain remaining goals. []  See Plan of Care  []  See progress note/recertification  []  See Discharge Summary         Progress towards goals / Updated goals:   Short Term Goals: To be accomplished in 4 weeks:                   Patient will report compliance with HEP 1x/day to aid in rehabilitation program.                   Status at IE: Patient instructed in and provided written copy of initial Home Exercise Program.                   Current: In-progress, reviewed HEP 2/11/2021                      Patient will increase lumbar ROM to WNL in all planes to aid in completion of ADLs. Status at IE: flexion fingertips 5 inches from floor. Current: Same as IE     Long Term Goals: To be accomplished in 8 weeks:                   Patient will increase core/abdominal and  B LE strength to 4+/5 MMT throughout to aid in completion of ADLs. Status at IE: core/abdominal 3, right LE 3+, left LE 4. Current: Same as IE                      Patient will report pain no greater than 0-2/10 throughout entire day to aid in completion of ADLs. Status at IE:                    Current: Same as IE                      Patent will improve stand/walk tolerance to >30 minutes to be able to complete ADLs. Status at IE: stand/walk tolerance <10 minutes. Current: Same as IE                      Patient will improve FOTO (an established functional score where 100 = no disability) score to 72 points to demonstrate improvement in functional status.                    Status at IE:60                   Current: Same as IE    PLAN  []  Upgrade activities as tolerated     [x]  Continue plan of care  []  Update interventions per flow sheet       []  Discharge due to:_  []  Other:_      Dru Mina PT, DPT 2/11/2021  4:13 PM    Future Appointments   Date Time Provider Department Center   2/15/2021  4:45 PM Jen [place], PT [facility]   2/17/2021  4:45 PM Jen [place], PT [facility]   2/22/2021  4:45 PM Jen [place], PT [facility]   3/1/2021  9:30 AM George Tyson, PT [facility]   3/3/2021  9:30 AM George Baton, PT [facility]   3/8/2021  9:30 AM George Rochaon, PT [facility]   3/10/2021  9:30 AM George Baton, PT [facility]

## 2021-02-17 ENCOUNTER — HOSPITAL ENCOUNTER (OUTPATIENT)
Dept: PHYSICAL THERAPY | Age: 66
Discharge: HOME | End: 2021-02-17
Payer: MEDICARE

## 2021-02-17 PROCEDURE — 97110 THERAPEUTIC EXERCISES: CPT

## 2021-02-17 NOTE — PROGRESS NOTES
PT DAILY TREATMENT NOTE - [facility]     Patient Name: Jeffory Boast  Date:2021  : 1955  [x]  Patient  Verified  Payor: Ginette Aguayo / Plan: [place] MEDICARE PART A & B / Product Type: Medicare /    In time:1640  Out time:1720  Total Treatment Time (min): 40  Total Timed Codes (min): 40   1:1 Treatment Time ( only): 40   Visit #: 3 of 16    Treatment Area: Low back pain [M54.5]    SUBJECTIVE  Pain Level (0-10 scale): 0  Any medication changes, allergies to medications, adverse drug reactions, diagnosis change, or new procedure performed?: [x] No    [] Yes (see summary sheet for update)  Subjective functional status/changes:   [] No changes reported    Patient reports that she had muscle soreness after last visit. OBJECTIVE    40 min Therapeutic Exercise:  [x] See flow sheet :   Rationale: increase ROM, increase strength and decrease pain to improve the patients ability to complete ADLs        With   [x] TE   [] TA   [] neuro   [] other: Patient Education: [x] Review HEP    [] Progressed/Changed HEP based on:   [] positioning   [] body mechanics   [] transfers   [] heat/ice application    [] other:      Other Objective/Functional Measures: NA     Pain Level (0-10 scale) post treatment: 0    ASSESSMENT/Changes in Function: Patient responds well to treatment session. Patient required cues to reduced juanita with exercise as she rushed through exercise resulting in poor form. Will advance exercise next visit.  No adverse effects were noted from today's treatment session    Patient will continue to benefit from skilled PT services to modify and progress therapeutic interventions, address functional mobility deficits, address ROM deficits, address strength deficits, analyze and address soft tissue restrictions, analyze and cue movement patterns, analyze and modify body mechanics/ergonomics, assess and modify postural abnormalities, address imbalance and instruct in home and community integration to attain remaining goals. []  See Plan of Care  []  See progress note/recertification  []  See Discharge Summary         Progress towards goals / Updated goals:  Short Term Goals: To be accomplished in 4 weeks:                   [id number] will report compliance with HEP 1x/day to aid in rehabilitation program.                   Status at IE: Patient instructed in and provided written copy of initial Home Exercise Program.                   Current: In-progress, reviewed activity pacing with exercise 2/17/2021                      Patient will increase lumbar ROM to WNL in all planes to aid in completion of ADLs.                  Status at IE: flexion fingertips 5 inches from floor.                   Current: Same as IE     Long Term Goals: To be accomplished in 8 weeks:                   Patient will increase core/abdominal and  B LE strength to 4+/5 MMT throughout to aid in completion of ADLs.                  Status at IE: core/abdominal 3, right LE 3+, left LE 4.                   Current: Same as IE                      Patient will report pain no greater than 0-2/10 throughout entire day to aid in completion of ADLs.                  Status at IE:                    Current:                       Patent will improve stand/walk tolerance to >30 minutes to be able to complete ADLs.                  Status at IE: stand/walk tolerance <10 minutes.                   Current: Same as IE                      Patient will improve FOTO (an established functional score where 100 = no disability) score to 72 points to demonstrate improvement in functional status.                    Status at IE:60                   Current: Same as IE  PLAN  []  Upgrade activities as tolerated     [x]  Continue plan of care  []  Update interventions per flow sheet       []  Discharge due to:_  []  Other:_      Luis Eduardo Cedillo, PT, DPT 2/17/2021  5:01 PM    Future Appointments   Date Time Provider Herbie Johnston   2/22/2021  4:45 PM Noel Diaz, PT [facility]   3/1/2021  9:30 AM Carmelo Benton, PT [facility]   3/3/2021  9:30 AM Carmelo Benton, PT [facility]   3/8/2021  9:30 AM Carmelo Benton, PT [facility]   3/10/2021  9:30 AM Janie Ghotra, PT [facility]

## 2021-02-22 ENCOUNTER — HOSPITAL ENCOUNTER (OUTPATIENT)
Dept: PHYSICAL THERAPY | Age: 66
Discharge: HOME | End: 2021-02-22
Payer: MEDICARE

## 2021-02-22 PROCEDURE — 97110 THERAPEUTIC EXERCISES: CPT

## 2021-02-22 NOTE — PROGRESS NOTES
PT DAILY TREATMENT NOTE - [facility]     Patient Name: Jaycee Oro  Date:2021  : 1955  [x]  Patient  Verified  Payor: [place] MEDICARE / Plan: [place] MEDICARE PART A & B / Product Type: Medicare /    In KCRY:2719  Out time:1725  Total Treatment Time (min): 40  Total Timed Codes (min): 40   1:1 Treatment Time ( only): 40   Visit #:  of 4    Treatment Area: Low back pain [M54.5]    SUBJECTIVE  Pain Level (0-10 scale): 16  Any medication changes, allergies to medications, adverse drug reactions, diagnosis change, or new procedure performed?: [x] No    [] Yes (see summary sheet for update)  Subjective functional status/changes:   [] No changes reported  Patient reports that she has some low back pain today on the right side. OBJECTIVE    40 min Therapeutic Exercise:  [] See flow sheet :   Rationale: increase ROM, increase strength and decrease pain to improve the patients ability to complete ADLs        With   [x] TE   [] TA   [] neuro   [] other: Patient Education: [x] Review HEP    [] Progressed/Changed HEP based on:   [] positioning   [] body mechanics   [] transfers   [] heat/ice application    [] other:      Other Objective/Functional Measures: NA     Pain Level (0-10 scale) post treatment: 0    ASSESSMENT/Changes in Function: Patient responds well to treatment session as she had a decrease in symptoms with exercise. She is challenged with lateral hip strengthening. Will advance exercise as tolerated. No adverse effects were noted from today's treatment session.     Patient will continue to benefit from skilled PT services to modify and progress therapeutic interventions, address functional mobility deficits, address ROM deficits, address strength deficits, analyze and address soft tissue restrictions, analyze and cue movement patterns, analyze and modify body mechanics/ergonomics, assess and modify postural abnormalities, address imbalance and instruct in home and community integration to attain remaining goals. []  See Plan of Care  []  See progress note/recertification  []  See Discharge Summary         Progress towards goals / Updated goals:  Short Term Goals: To be accomplished in 4 weeks:                   [id number] will report compliance with HEP 1x/day to aid in rehabilitation program.                   Status at IE: Patient instructed in and provided written copy of initial Home Exercise Program.                   Current:  In-progress, reviewed activity pacing with exercise 2/17/2021                      Patient will increase lumbar ROM to WNL in all planes to aid in completion of ADLs.                  Status at IE: flexion fingertips 5 inches from floor.                   Current: Same as IE     Long Term Goals: To be accomplished in 8 weeks:                   Patient will increase core/abdominal and  B LE strength to 4+/5 MMT throughout to aid in completion of ADLs.                  Status at IE: core/abdominal 3, right LE 3+, left LE 4.                   Current: Same as IE                      Patient will report pain no greater than 0-2/10 throughout entire day to aid in completion of ADLs.                  Status at IE:                    Current: In-progress, 0-3/10, 2/22/2021                      Patent will improve stand/walk tolerance to >30 minutes to be able to complete ADLs.                  Status at IE: stand/walk tolerance <10 minutes.                   Current: Same as IE                      Patient will improve FOTO (an established functional score where 100 = no disability) score to 72 points to demonstrate improvement in functional status.                    Status at IE:60                   Current: Same as IE    PLAN  []  Upgrade activities as tolerated     [x]  Continue plan of care  []  Update interventions per flow sheet       []  Discharge due to:_  []  Other:_      Simone De Jesus PT, DPT 2/22/2021  4:53 PM    Future Appointments   Date Time Provider Department [place]   3/1/2021  9:30 AM JANNY Reyes [facility]   3/3/2021  9:30 AM JANNY Reyes [facility]   3/8/2021  9:30 AM JANNY Reyes [facility]   3/10/2021  9:30 AM Jamie Ghotra, [facility]ANNY HUNTER [facility]

## 2021-03-01 ENCOUNTER — APPOINTMENT (OUTPATIENT)
Dept: PHYSICAL THERAPY | Age: 66
End: 2021-03-01
Payer: MEDICARE

## 2021-03-03 ENCOUNTER — HOSPITAL ENCOUNTER (OUTPATIENT)
Dept: PHYSICAL THERAPY | Age: 66
Discharge: HOME | End: 2021-03-03
Payer: MEDICARE

## 2021-03-03 PROCEDURE — 97110 THERAPEUTIC EXERCISES: CPT

## 2021-03-03 NOTE — PROGRESS NOTES
PT DAILY TREATMENT NOTE    Patient Name: Elizabeth Reddy  Date:3/3/2021  : 1955  [x]  Patient  Verified  Payor: [place] MEDICARE / Plan: [place] MEDICARE PART A & B / Product Type: Medicare /    In time: 390  Out time: 1010  Total Treatment Time (min): 42  Total Timed Codes (min): 42  1:1 Treatment Time ([facility] only): 40   Visit #: 5 of 16    Treatment Area: Low back pain [M54.5]    SUBJECTIVE  Pain Level (0-10 scale): 0  Any medication changes, allergies to medications, adverse drug reactions, diagnosis change, or new procedure performed?: [x] No    [] Yes (see summary sheet for update)  Subjective functional status/changes:   [] No changes reported  \"It still bothers me up and down the stairs sometimes, but it is happening less often. \"    OBJECTIVE    40 min Therapeutic Exercise:  [x] See flow sheet :   Rationale: increase ROM, increase strength and decrease pain to improve the patients ability to complete ADLs  ambulation safety and efficiency in order to improve patient's ability to safely ambulate at home for self care. With   [] TE   [] TA   [] neuro   [] other: Patient Education: [x] Review HEP    [] Progressed/Changed HEP based on:   [] positioning   [] body mechanics   [] transfers   [] heat/ice application    [] other:      Other Objective/Functional Measures: NA     Pain Level (0-10 scale) post treatment: 0    ASSESSMENT/Changes in Function: Patient progressing in exercise tolerance and pain reduction. Patient responds well to treatment session. No adverse effects were noted from today's treatment session.      Patient will continue to benefit from skilled PT services to modify and progress therapeutic interventions, address functional mobility deficits, address ROM deficits, address strength deficits, analyze and address soft tissue restrictions, analyze and cue movement patterns, analyze and modify body mechanics/ergonomics, assess and modify postural abnormalities,  and instruct in home and community integration to attain remaining goals. []  See Plan of Care  []  See progress note/recertification  []  See Discharge Summary         Progress towards goals / Updated goals:  Short Term Goals: To be accomplished in 4 weeks:                   HARMAN will report compliance with HEP 1x/day to aid in rehabilitation program.                   Status at IE: Patient instructed in and provided written copy of initial Home Exercise Program.                   Current:  In-progress, reviewed activity pacing with exercise 2/17/2021                      Patient will increase lumbar ROM to WNL in all planes to aid in completion of ADLs.                  Status at IE: flexion fingertips 5 inches from floor.                   Current: flexion fingertips improved to 4 inches from floor. 3/3/2021     Long Term Goals: To be accomplished in 8 weeks:                   [id number] will increase core/abdominal and  B LE strength to 4+/5 MMT throughout to aid in completion of ADLs.                  Status at IE: core/abdominal 3, right LE 3+, left LE 4.                   Current: Same as IE                      Patient will report pain no greater than 0-2/10 throughout entire day to aid in completion of ADLs.                  Status at IE:                    Current: In-progress, 0-3/10, 2/22/2021                      Patent will improve stand/walk tolerance to >30 minutes to be able to complete ADLs.                  Status at IE: stand/walk tolerance <10 minutes.                   Current: Same as IE                      Patient will improve FOTO (an established functional score where 100 = no disability) score to 72 points to demonstrate improvement in functional status.                    Status at IE:60                   Current: Same as IE    PLAN  []  Upgrade activities as tolerated     [x]  Continue plan of care  []  Update interventions per flow sheet       []  Discharge due to:_  []  Other:_      Patricia Mohan, PT, DPT 3/3/2021  10:18 AM    Future Appointments   Date Time Provider Herbie Johnston   3/8/2021  9:30 AM Ezekiel Leonardo, PT [facility]   3/10/2021  9:30 AM Susana Ghotra, PT [facility]

## 2021-03-08 ENCOUNTER — HOSPITAL ENCOUNTER (OUTPATIENT)
Dept: PHYSICAL THERAPY | Age: 66
Discharge: HOME | End: 2021-03-08
Payer: MEDICARE

## 2021-03-08 PROCEDURE — 97140 MANUAL THERAPY 1/> REGIONS: CPT | Performed by: PHYSICAL THERAPIST

## 2021-03-08 PROCEDURE — 97110 THERAPEUTIC EXERCISES: CPT | Performed by: PHYSICAL THERAPIST

## 2021-03-08 NOTE — PROGRESS NOTES
[facility] at [address] [place] [address] Drive: [phone]   Fax: [phone]  Progress Note  Patient Name: Volodymyr Pepper : 1955   Medical   Diagnosis: Low back pain [M54.5] Treatment Diagnosis: Low back pain    Onset Date: Low back pain /SI      Referral Source: Analisa Bose MD Start of Care [facility]): 2021   Prior Hospitalization: See medical history Provider #: [id number]   Medications: Verified on Patient Summary List      Prior Level of Function: treadmill 3 miles 3-4 x/wk   Comorbidities: OA, HTN, osteoporosis, colon cancer. Surgical Hx: L3-4 discectomy 2019, colon resection, bariatric surgery       ===========================================================================================  Assessment / Summary of Care:  Volodymyr Pepper is a 74 y.o.  female who has completed 6 physical therapy sessions and is progressing with improved ROM , strength and tolerance to daily activity with reports of tolerating > 60 min walking in grocery store and working in kitchen. Her pain still fluctuating 0-5/10 pending on activity and time of day worse at end of the day.  She is progressing well toward her goals.     ===========================================================================================    Plan:Continue therapy per initial plan/protocol at a frequency of  2 x per week for 4-5 more weeks    Progress Towards Goals:   Short Term Goals: To be accomplished in 4 weeks:                   Patient will report compliance with HEP 1x/day to aid in rehabilitation program.                   Status at IE: Patient instructed in and provided written copy of initial Home Exercise Program.                   Current:  pt demonstrated good understanding of current ex , doing HEP at least 1 other time this week , progressing 3/8/2021                      Patient will increase lumbar ROM to WNL in all planes to aid in completion of ADLs.                   Status at IE: flexion fingertips 5 inches from floor.                   Current: flexion fingertips improved to 3.5\" inches from floor.   3/8/2021     Long Term Goals: To be accomplished in 8 weeks:                   Patient will increase core/abdominal and  B LE strength to 4+/5 MMT throughout to aid in completion of ADLs.                  Status at IE: core/abdominal 3, right LE 3+, left LE 4.                   Current: 3/8/2021 improved bridge height > 75% distance , MMT : HF right 4+ /5 , left 5/5 , Hamstring 4+/5 right , 5/5 left   Quad , ADD /ABD bilateral 5/5  ER hip 4 right , left 4-/5   Progressing                         Patient will report pain no greater than 0-2/10 throughout entire day to aid in completion of ADLs.                  Status at IE:                    Current:  In-progress, 0-5 at most usually at night  , progressing , 3/8/2021                      Patent will improve stand/walk tolerance to >30 minutes to be able to complete ADLs.                  Status at IE: stand/walk tolerance <10 minutes.                   Current: pt can stand and walk at commisary x 60 min , and  kitchen and do cooking cleaning x 60 min best if keeps moving then when sit and gets back up stiff . MET 3/8/2021                      Patient will improve FOTO (an established functional score where 100 = no disability) score to 72 points to demonstrate improvement in functional status.                  Status at IE:60                   Current: 55/100 ( 5 point less ) 3/8/2021    ===========================================================================================  Subjective: pt reports no increase pain following therapy but sore then next day. Tolerating >60 min walking in store and working in kitchen at home. Best with movement , right hip /back catches with different movments .          Therapist Signature: Regino Finley MPT Date: 6/8/2699   Re-Certification: NA Time: 10:42 AM         [facility] at [facility], [address] [place] [address]                    Phone: [phone]   Fax: [phone]  .

## 2021-03-08 NOTE — PROGRESS NOTES
PT DAILY TREATMENT NOTE    Patient Name: Pelon Bauman  Date:3/8/2021  : 1955  [x]  Patient  Verified  [place]or: [place] MED[place] / Plan: [place] MED[place]ARE [place] & B / Product Type: Medicare /    In time: 8138  Out time: 1035  Total Treatment Time (min): 67  Total Timed Codes (min): 62 ( 5 min FOTO questionaire)   1:1 Treatment Time ( only): 60    Visit #: 6 of 16    Treatment Area: Low back pain [M54.5]    SUBJECTIVE  Pain Level (0-10 scale): 0 rest   Any medication changes, allergies to medications, adverse drug reactions, diagnosis change, or new procedure performed?: [x] No    [] Yes (see summary sheet for update)  Subjective functional status/changes:   [] No changes reported  Pt reported this weekend went to move bag over felt twinge in back , 4/10 , she feels it twinge if she breathes deep . Into right buttock when tired this is location hurts the worse pulls into her upper leg     OBJECTIVE  54 min Therapeutic Exercise:  [x]? See flow sheet :   Rationale: increase ROM, increase strength and decrease pain to improve the patients ability to complete ADLs  ambulation safety and efficiency in order to improve patient's ability to safely ambulate at home for self care. 8 min Manual Therapy:  MET for sacral alignment    Rationale: decrease pain, increase ROM and increase tissue extensibility to improve the patients extension mobility without catching allowing improved functional mobility and gait . The manual therapy interventions were performed at a separate and distinct time from the therapeutic activities interventions.           With   [] TE   [] TA   [] neuro   [] other: Patient Education: [x] Review HEP    [] Progressed/Changed HEP based on:   [] positioning   [] body mechanics   [] transfers   [] heat/ice application    [] other:      Other Objective/Functional Measures:  Manual :    = leg length grossly = PSIS , ASIS , noted deep sulcus right sacrum during extension motion , = during flexion , IR hip tight right , hip flexor tight left   Performed MET for sacral alignment = sulcus at rest and in extension when done and noted now = IR hip     MMT: MMT : HF right 4+ /5 , left 5/5 , Hamstring 4+/5 right , 5/5 left   Quad , ADD /ABD bilateral 5/5  ER hip 4 right , left 4-/5     Standing flexion fingers 3.5\" from floor      Pain Level (0-10 scale) post treatment: 0    ASSESSMENT/Changes in Function:progressing in exercise tolerance and pain reduction, pt tolerated new ex into extension and responded well to manual tech this session for sacral alignment noting less catch following tech. No adverse effects were noted from today's treatment session. Patient will continue to benefit from skilled PT services to modify and progress therapeutic interventions, address functional mobility deficits, address ROM deficits, address strength deficits, analyze and address soft tissue restrictions, analyze and cue movement patterns, analyze and modify body mechanics/ergonomics, assess and modify postural abnormalities and address imbalance/dizziness to attain remaining goals. [x]  See Plan of Care  []  See progress note/recertification  []  See Discharge Summary         Progress towards goals / Updated goals:  Short Term Goals: To be accomplished in 4 weeks:                   EVAN will report compliance with HEP 1x/day to aid in rehabilitation program.                   Status at IE: Patient instructed in and provided written copy of initial Home Exercise Program.                   Current:  pt demonstrated good understanding of current ex , doing HEP at least 1 other time this week , progressing 3/8/2021                      Patient will increase lumbar ROM to WNL in all planes to aid in completion of ADLs.                  Status at IE: flexion fingertips 5 inches from floor.                   Current: flexion fingertips improved to 3.5\" inches from floor.    3/8/2021     Long Term Goals: To be accomplished in 8 weeks:                   [id number] will increase core/abdominal and  B LE strength to 4+/5 MMT throughout to aid in completion of ADLs.                  Status at IE: core/abdominal 3, right LE 3+, left LE 4.                   Current: 3/8/2021 improved bridge height > 75% distance , MMT : HF right 4+ /5 , left 5/5 , Hamstring 4+/5 right , 5/5 left   Quad , ADD /ABD bilateral 5/5  ER hip 4 right , left 4-/5   Progressing                        Patient will report pain no greater than 0-2/10 throughout entire day to aid in completion of ADLs.                  Status at IE:                    Current:  In-progress, 0-5 at most usually at night  , progressing , 3/8/2021                      Patent will improve stand/walk tolerance to >30 minutes to be able to complete ADLs.                  Status at IE: stand/walk tolerance <10 minutes.                   Current: pt can stand and walk at commisary x 60 min , and  kitchen and do cooking cleaning x 60 min best if keeps moving then when sit and gets back up stiff . MET 3/8/2021                      Patient will improve FOTO (an established functional score where 100 = no disability) score to 72 points to demonstrate improvement in functional status.                    Status at IE:60                   Current: 55/100 ( 5 point less ) 3/8/2021    PLAN  [x]  Upgrade activities as tolerated     [x]  Continue plan of care  []  Update interventions per flow sheet       []  Discharge due to:_  []  Other:_      Elayne Paz PT 3/8/2021  9:33 AM    Future Appointments   Date Time Provider Herbie Johnston   3/10/2021  9:30 AM Cindy Crawley [facility]

## 2021-03-10 ENCOUNTER — HOSPITAL ENCOUNTER (OUTPATIENT)
Dept: PHYSICAL THERAPY | Age: 66
Discharge: HOME | End: 2021-03-10
Payer: MEDICARE

## 2021-03-10 PROCEDURE — 97110 THERAPEUTIC EXERCISES: CPT

## 2021-03-10 NOTE — PROGRESS NOTES
PT DAILY TREATMENT NOTE    Patient Name: Pelon Bauman  Date:3/10/2021  : 1955  [x]  Patient  Verified  Payor: Deana Becerril / Plan: [place] MEDICARE PART A & B / Product Type: Medicare /    In time: 059  Out time: 1017  Total Treatment Time (min): 46  Total Timed Codes (min): 46  1:1 Treatment Time ( only): 41   Visit #: 7 of 16    Treatment Area: Low back pain [M54.5]    SUBJECTIVE  Pain Level (0-10 scale): 2-3  Any medication changes, allergies to medications, adverse drug reactions, diagnosis change, or new procedure performed?: [x] No    [] Yes (see summary sheet for update)  Subjective functional status/changes:   [] No changes reported  \"I had a fair amount of pain last night and it gave me a lot of trouble sleeping. But, I did some stretching and warm ups this morning and the pain has lessened. \"    OBJECTIVE    41 min Therapeutic Exercise:  [x] See flow sheet :   Rationale: increase ROM, increase strength and decrease pain to improve the patients ability to complete ADLs  ambulation safety and efficiency in order to improve patient's ability to safely ambulate at home for self care. With   [] TE   [] TA   [] neuro   [] other: Patient Education: [x] Review HEP    [] Progressed/Changed HEP based on:   [] positioning   [] body mechanics   [] transfers   [] heat/ice application    [] other:      Other Objective/Functional Measures: NA     Pain Level (0-10 scale) post treatment: 1    ASSESSMENT/Changes in Function: Patient instructed in options to modify sleep positions to assist in diminishing night pain. Also instructed in self mobilization of SI Joint Dysfunction with stretches on step. Patient provided written copy of updates to HEP and level one resistance band loop for use in HEP. Patient responds well to treatment session. No adverse effects were noted from today's treatment session.      Patient will continue to benefit from skilled PT services to modify and progress therapeutic interventions, address functional mobility deficits, address ROM deficits, address strength deficits, analyze and address soft tissue restrictions, analyze and cue movement patterns, analyze and modify body mechanics/ergonomics, assess and modify postural abnormalities,  and instruct in home and community integration to attain remaining goals. []  See Plan of Care  []  See progress note/recertification  []  See Discharge Summary         Progress towards goals / Updated goals:  Short Term Goals: To be accomplished in 4 weeks:                   [id number] will report compliance with HEP 1x/day to aid in rehabilitation program.                   Status at IE: Patient instructed in and provided written copy of initial Home Exercise Program.                   Current:  pt demonstrated good understanding of current ex , doing HEP at least 1 other time this week , progressing 3/8/2021                      Patient will increase lumbar ROM to WNL in all planes to aid in completion of ADLs.                  Status at IE: flexion fingertips 5 inches from floor.                   Current: flexion fingertips improved to 3.5\" inches from floor.   3/8/2021     Long Term Goals: To be accomplished in 8 weeks:                   Patient will increase core/abdominal and  B LE strength to 4+/5 MMT throughout to aid in completion of ADLs.                  Status at IE: core/abdominal 3, right LE 3+, left LE 4.                   Current: 3/8/2021 improved bridge height > 75% distance , MMT : HF right 4+ /5 , left 5/5 , Hamstring 4+/5 right , 5/5 left   Quad , ADD /ABD bilateral 5/5  ER hip 4 right , left 4-/5   Progressing                         Patient will report pain no greater than 0-2/10 throughout entire day to aid in completion of ADLs.                    Status at IE:                    Current:  In-progress, 0-5 at most usually at night  , progressing , 3/8/2021                      Patent will improve stand/walk tolerance to >30 minutes to be able to complete ADLs.                  Status at IE: stand/walk tolerance <10 minutes.                   Current: pt can stand and walk at commisary x 60 min , and  kitchen and do cooking cleaning x 60 min best if keeps moving then when sit and gets back up stiff . MET 3/8/2021                      Patient will improve FOTO (an established functional score where 100 = no disability) score to 72 points to demonstrate improvement in functional status.                  Status at IE:60                   Current: 55/100 ( 5 point less ) 3/8/2021    PLAN  []  Upgrade activities as tolerated     [x]  Continue plan of care  []  Update interventions per flow sheet       []  Discharge due to:_  []  Other:_      Gracia Syed, PT, DPT 3/10/2021  9:55 AM    No future appointments.

## 2021-03-15 ENCOUNTER — HOSPITAL ENCOUNTER (OUTPATIENT)
Dept: PHYSICAL THERAPY | Age: 66
Discharge: HOME | End: 2021-03-15
Payer: MEDICARE

## 2021-03-15 PROCEDURE — 97110 THERAPEUTIC EXERCISES: CPT | Performed by: PHYSICAL THERAPIST

## 2021-03-15 PROCEDURE — 97140 MANUAL THERAPY 1/> REGIONS: CPT | Performed by: PHYSICAL THERAPIST

## 2021-03-15 NOTE — PROGRESS NOTES
PT DAILY TREATMENT NOTE    Patient Name: Jeffory Boast  Date:3/15/2021  : 1955  [x]  Patient  Verified  Payor: Ginette Aguayo / Plan: [place] MEDICARE PART A & B / Product Type: Medicare /    In time:1205  Out time:1246  Total Treatment Time (min): 41  Total Timed Codes (min): 41  1:1 Treatment Time ( [address] only): 41   Visit #: 8 of 16    Treatment Area: Low back pain [M54.5]    SUBJECTIVE  Pain Level (0-10 scale): 0  Any medication changes, allergies to medications, adverse drug reactions, diagnosis change, or new procedure performed?: [x] No    [] Yes (see summary sheet for update)  Subjective functional status/changes:   [] No changes reported  Pt had a problem with her dog so was 20 min late today . Pt was getting a sharp pain in right buttock when sitting on her . Pt notes slept better with sleep positioning instructions given last session . OBJECTIVE  33 min Therapeutic Exercise:  [x]? ? See flow sheet :   Rationale: increase ROM, increase strength and decrease pain to improve the patients ability to complete ADLs  ambulation safety and efficiency in order to improve patient's ability to safely ambulate at home for self care.      8 min Manual Therapy:  MET for sacral alignment    Rationale: decrease pain, increase ROM and increase tissue extensibility to improve the patients extension mobility without catching allowing improved functional mobility and gait . The manual therapy interventions were performed at a separate and distinct time from the therapeutic activities interventions.           With   [] TE   [] TA   [] neuro   [] other: Patient Education: [x] Review HEP    [] Progressed/Changed HEP based on:   [] positioning   [] body mechanics   [] transfers   [] heat/ice application    [] other:      Other Objective/Functional Measures: noted right deep sulcus sacrum in sitting extension but = in flexion , performed MET for sacral torsion = sulcus when done with both sitting neutral and extension and with full pressup extension . Unilateral pressup sting with right press , but none on left , performed 2 xets 10 reps on left then rechecked right and noted sting is now gone . Pain Level (0-10 scale) post treatment: 1 slight twinge right post     ASSESSMENT/Changes in Function: responded well with = sacral alignment at rest and in flexion and extension post manual , responded well to repeated mobility ex with unilateral pressups . Progressed well with step up and over on 8\" step and increase resistance with Tband SLR in standing . No adverse affect with therapy. Patient will continue to benefit from skilled PT services to modify and progress therapeutic interventions, address functional mobility deficits, address ROM deficits, address strength deficits, analyze and address soft tissue restrictions, analyze and cue movement patterns, analyze and modify body mechanics/ergonomics, assess and modify postural abnormalities and address imbalance/dizziness to attain remaining goals. [x]  See Plan of Care  []  See progress note/recertification  []  See Discharge Summary         Progress towards goals / Updated goals:  Short Term Goals: To be accomplished in 4 weeks:                   [facility] will report compliance with HEP 1x/day to aid in rehabilitation program.                   Status at IE: Patient instructed in and provided written copy of initial Home Exercise Program.                   Current:  pt demonstrated good understanding of current ex , doing HEP at least 1 other time this week , progressing 3/8/2021                      Patient will increase lumbar ROM to WNL in all planes to aid in completion of ADLs.                    Status at IE: flexion fingertips 5 inches from floor.                   Current: flexion fingertips improved to 3.5\" inches from floor.   3/8/2021, noted full pressup WNL 3/15/2021      Long Term Goals: To be accomplished in 8 weeks:                   [id number] will increase core/abdominal and  B LE strength to 4+/5 MMT throughout to aid in completion of ADLs.                  Status at IE: core/abdominal 3, right LE 3+, left LE 4.                   Current: 3/8/2021 improved bridge height > 75% distance , MMT : HF right 4+ /5 , left 5/5 , Hamstring 4+/5 right , 5/5 left   Quad , ADD /ABD bilateral 5/5  ER hip 4 right , left 4-/5   Progressing                         Patient will report pain no greater than 0-2/10 throughout entire day to aid in completion of ADLs.                  Status at IE:                    Current:  In-progress, 0-5 at most usually at night  , progressing , 3/8/2021                      Patent will improve stand/walk tolerance to >30 minutes to be able to complete ADLs.                  Status at IE: stand/walk tolerance <10 minutes.                   Current: pt can stand and walk at commisary x 60 min , and  kitchen and do cooking cleaning x 60 min best if keeps moving then when sit and gets back up stiff . MET 3/8/2021                      Patient will improve FOTO (an established functional score where 100 = no disability) score to 72 points to demonstrate improvement in functional status.                    Status at IE:60                   Current: 55/100 ( 5 point less ) 3/8/2021    PLAN  [x]  Upgrade activities as tolerated     [x]  Continue plan of care  []  Update interventions per flow sheet       []  Discharge due to:_  []  Other:_      Dona Borja PT 3/15/2021  12:10 PM    Future Appointments   Date Time Provider Herbie Johnston   3/19/2021  9:30 AM Reina Fulton [facility]   3/22/2021  9:30 AM Reina Fulton PT [facility]   3/24/2021  9:30 AM [facility]   3/29/2021 10:15 AM Myra Sanabria PT [facility]   3/31/2021  9:30 AM Cooper Leonardo PT [facility]

## 2021-03-19 ENCOUNTER — HOSPITAL ENCOUNTER (OUTPATIENT)
Dept: PHYSICAL THERAPY | Age: 66
Discharge: HOME | End: 2021-03-19
Payer: MEDICARE

## 2021-03-19 PROCEDURE — 97110 THERAPEUTIC EXERCISES: CPT | Performed by: PHYSICAL THERAPIST

## 2021-03-19 NOTE — PROGRESS NOTES
PT DAILY TREATMENT NOTE    Patient Name: Bismark Delgadillo  Date:3/19/2021  : 1955  [x]  Patient  Verified  Payor: Jessica Collins / Plan: [place] / Product Type: Medicare /    In [id number]  Out time:1020  Total Treatment Time (min): 44  Total Timed Codes (min): 44  1:1 Treatment Time ( only): 40   Visit #: 9 of 16    Treatment Area: Low back pain [M54.5]    SUBJECTIVE  Pain Level (0-10 scale): 1-2  Any medication changes, allergies to medications, adverse drug reactions, diagnosis change, or new procedure performed?: [x] No    [] Yes (see summary sheet for update)  Subjective functional status/changes:   [] No changes reported  Pt notes 4 days ago lying on stomach turned left and felt grabbing burn pain in proximal medial leg and groin area , now still sore     OBJECTIVE  44 min Therapeutic Exercise:  [x]? ?? See flow sheet :   Rationale: increase ROM, increase strength and decrease pain to improve the patients ability to complete ADLs  ambulation safety and efficiency in order to improve patient's ability to safely ambulate at home for self care.            With   [] TE   [] TA   [] neuro   [] other: Patient Education: [x] Review HEP    [] Progressed/Changed HEP based on:   [] positioning   [] body mechanics   [] transfers   [] heat/ice application    [] other:      Other Objective/Functional Measures: right 85 deg , left 80 deg SLR   = PSIS , = ASIS , = sulcus , = leg length     Full pressups but burn on right LB buttock , unilateral pressup right burn to right side LB , performed 10 reps 5 sec holds on left , then recheck right noted less burn in LB , recheck bilateral pressup and also less burn noted     Pain Level (0-10 scale) post treatment: 0    ASSESSMENT/Changes in Function: pt responded well to unilateral pressup with focus on left  Side repeated reps , noted less burn in LB with pressups , with right unilateral pressup and with walking once done , progressing well with ex , pt is pleasant and motivated , no adverse affect with therapy . Patient will continue to benefit from skilled PT services to modify and progress therapeutic interventions, address functional mobility deficits, address ROM deficits, address strength deficits, analyze and address soft tissue restrictions, analyze and cue movement patterns, analyze and modify body mechanics/ergonomics and assess and modify postural abnormalities to attain remaining goals. [x]  See Plan of Care  []  See progress note/recertification  []  See Discharge Summary         Progress towards goals / Updated goals:  Short Term Goals: To be accomplished in 4 weeks:                   [id number] will report compliance with HEP 1x/day to aid in rehabilitation program.                   Status at IE: Patient instructed in and provided written copy of initial Home Exercise Program.                   Current:  pt demonstrated good understanding of current ex , doing HEP at least 2 other time this week , progressing 3/19/2021                      Patient will increase lumbar ROM to WNL in all planes to aid in completion of ADLs.                  Status at IE: flexion fingertips 5 inches from floor.                   Current: flexion fingertips improved to 2\" inches from floor. noted full pressup WNL 3/19/2021 MET      Long Term Goals: To be accomplished in 8 weeks:                   Patient will increase core/abdominal and  B LE strength to 4+/5 MMT throughout to aid in completion of ADLs.                  Status at IE: core/abdominal 3, right LE 3+, left LE 4.                   Current: 3/8/2021 improved bridge height > 75% distance , MMT : HF right 4+ /5 , left 5/5 , Hamstring 4+/5 right , 5/5 left   Quad , ADD /ABD bilateral 5/5  ER hip 4 right , left 4-/5   Progressing                         Patient will report pain no greater than 0-2/10 throughout entire day to aid in completion of ADLs.                    Status at IE:                  Current:  In-progress, 0-4 at most usually at night ache and sore and keep her awake at night  , progressing , 3/19/2021                      Patent will improve stand/walk tolerance to >30 minutes to be able to complete ADLs.                  Status at IE: stand/walk tolerance <10 minutes.                   Current: pt can stand and walk at commisary x 60 min , and  kitchen and do cooking cleaning x 60 min best if keeps moving then when sit and gets back up stiff . MET 3/8/2021                      Patient will improve FOTO (an established functional score where 100 = no disability) score to 72 points to demonstrate improvement in functional status.                    Status at IE:60                   Current: 55/100 ( 5 point less ) 3/8/2021    PLAN  [x]  Upgrade activities as tolerated     [x]  Continue plan of care  []  Update interventions per flow sheet       []  Discharge due to:_  []  Other:_      Barry Mayorga PT 3/19/2021  9:41 AM    Future Appointments   Date Time Provider Herbie Johnston   3/22/2021  9:30 AM Lexis Vincent, PT [facility]   3/24/2021  9:30 AM JANNY Melissa [facility]   3/29/2021 10:15 AM JANNY Argueta THE [facility]   3/31/2021  9:30 AM JANNY Melissa THE [facility]

## 2021-03-22 ENCOUNTER — HOSPITAL ENCOUNTER (OUTPATIENT)
Dept: PHYSICAL THERAPY | Age: 66
Discharge: HOME | End: 2021-03-22
Payer: MEDICARE

## 2021-03-22 PROCEDURE — 97110 THERAPEUTIC EXERCISES: CPT | Performed by: PHYSICAL THERAPIST

## 2021-03-22 NOTE — PROGRESS NOTES
PT DAILY TREATMENT NOTE    Patient Name: Concha Louie  Date:3/22/2021  : 1955  [x]  Patient  Verified  Payor: [place] MEDICARE / Plan: [place] MEDICARE PART A & B / Product Type: Medicare /    In [id number]  Out time:1022  Total Treatment Time (min): 49  Total Timed Codes (min): 49  1:1 Treatment Time ( only): 42   Visit #: 2-3 of 16    Treatment Area: Low back pain [M54.5]    SUBJECTIVE  Pain Level (0-10 scale): 2-3  Any medication changes, allergies to medications, adverse drug reactions, diagnosis change, or new procedure performed?: [x] No    [] Yes (see summary sheet for update)  Subjective functional status/changes:   [x] No changes reported    OBJECTIVE  49 min Therapeutic Exercise:  [x]? ??? See flow sheet :   Rationale: increase ROM, increase strength and decrease pain to improve the patients ability to complete ADLs  ambulation safety and efficiency in order to improve patient's ability to safely ambulate at home for self care.           With   [] TE   [] TA   [] neuro   [] other: Patient Education: [x] Review HEP    [] Progressed/Changed HEP based on:   [] positioning   [] body mechanics   [] transfers   [] heat/ice application    [] other:      Other Objective/Functional Measures: mild deep right sulcus with pressup, = with full prone , after unilateral pressup more = even with full pressup. Pain Level (0-10 scale) post treatment: 0    ASSESSMENT/Changes in Function: burn on right side low back with full pressup and unilateral pressup on right initally however after repeated reps unilateral pressup on left noted no more burn with these motions and better sacral movement into pressup noted. Added leg press with good control today no pain in hip . No adverse affect with therapy progressing very well .      Patient will continue to benefit from skilled PT services to modify and progress therapeutic interventions, address functional mobility deficits, address ROM deficits, address strength deficits, analyze and address soft tissue restrictions, analyze and cue movement patterns, analyze and modify body mechanics/ergonomics, assess and modify postural abnormalities and address imbalance/dizziness to attain remaining goals. [x]  See Plan of Care  []  See progress note/recertification  []  See Discharge Summary         Progress towards goals / Updated goals:  Short Term Goals: To be accomplished in 4 weeks:                   CVHEUHT will report compliance with HEP 1x/day to aid in rehabilitation program.                   Status at IE: Patient instructed in and provided written copy of initial Home Exercise Program.                   Current:  pt demonstrated good understanding of current ex , doing HEP at least 2 other time this week , progressing 3/19/2021                      Patient will increase lumbar ROM to WNL in all planes to aid in completion of ADLs.                  Status at IE: flexion fingertips 5 inches from floor.                   Current: flexion fingertips improved to 2\" inches from floor. noted full pressup WNL 3/19/2021 MET      Long Term Goals: To be accomplished in 8 weeks:                   Patient will increase core/abdominal and  B LE strength to 4+/5 MMT throughout to aid in completion of ADLs.                  Status at IE: core/abdominal 3, right LE 3+, left LE 4.                   Current: 3/8/2021 improved bridge height > 75% distance , MMT : HF right 4+ /5 , left 5/5 , Hamstring 4+/5 right , 5/5 left   Quad , ADD /ABD bilateral 5/5  ER hip 4 right , left 4-/5   Progressing                         Patient will report pain no greater than 0-2/10 throughout entire day to aid in completion of ADLs.                    Status at IE:                    Current:  In-progress, 0-4 at most usually at night ache and sore and keep her awake at night  , progressing , 3/19/2021                      Patent will improve stand/walk tolerance to >30 minutes to be able to complete ADLs.                   Status at IE: stand/walk tolerance <10 minutes.                   Current: pt can stand and walk at commisary x 60 min , and  kitchen and do cooking cleaning x 60 min best if keeps moving then when sit and gets back up stiff . MET 3/8/2021                      Patient will improve FOTO (an established functional score where 100 = no disability) score to 72 points to demonstrate improvement in functional status.                    Status at IE:60                   Current: 55/100 ( 5 point less ) 3/8/2021    PLAN  [x]  Upgrade activities as tolerated     [x]  Continue plan of care  []  Update interventions per flow sheet       []  Discharge due to:_  []  Other:_      Dottie Rey, PT 3/22/2021  9:36 AM    Future Appointments   Date Time Provider Herbie Johnston   3/24/2021  9:30 AM Roberta Samuel, PT [facility]   3/29/2021 10:15 AM JANNY Lynn [facility]   3/31/2021  9:30 AM JANNY Urban [facility]

## 2021-03-24 ENCOUNTER — APPOINTMENT (OUTPATIENT)
Dept: PHYSICAL THERAPY | Age: 66
End: 2021-03-24
Payer: MEDICARE

## 2021-03-29 ENCOUNTER — HOSPITAL ENCOUNTER (OUTPATIENT)
Dept: PHYSICAL THERAPY | Age: 66
Discharge: HOME | End: 2021-03-29
Payer: MEDICARE

## 2021-03-29 PROCEDURE — 97530 THERAPEUTIC ACTIVITIES: CPT

## 2021-03-29 PROCEDURE — 97110 THERAPEUTIC EXERCISES: CPT

## 2021-03-29 NOTE — PROGRESS NOTES
PT DAILY TREATMENT NOTE - [facility]     Patient Name: Phillip Gibson  Date:3/29/2021  : 1955  [x]  Patient  Verified  Payor: [place] MEDICARE / Plan: [place] MEDICARE PART A & B / Product Type: Medicare /    In time:1017  Out time:1102  Total Treatment Time (min): 45  Total Timed Codes (min): 45   1:1 Treatment Time ( [address] only): 45   Visit #: 11 of 16    Treatment Area: Low back pain [M54.5]    SUBJECTIVE  Pain Level (0-10 scale): 0  Any medication changes, allergies to medications, adverse drug reactions, diagnosis change, or new procedure performed?: [x] No    [] Yes (see summary sheet for update)  Subjective functional status/changes:   [] No changes reported  Patient reports no new changes since last visit. OBJECTIVE    35 min Therapeutic Exercise:  [x] See flow sheet :   Rationale: increase ROM, increase strength and decrease pain to improve the patients ability to complete ADLs    10 min Therapeutic Activity:  [x]  See flow sheet :   Rationale: increase ROM, increase strength and improve coordination  to improve the patients ability to complete AD          With   [x] TE   [] TA   [] neuro   [] other: Patient Education: [x] Review HEP    [] Progressed/Changed HEP based on:   [] positioning   [] body mechanics   [] transfers   [] heat/ice application    [] other:      Other Objective/Functional Measures: NA     Pain Level (0-10 scale) post treatment: 0    ASSESSMENT/Changes in Function: Patient responds well to treatment session. Patient was minimally challenged with exercise prescribed. She required fewer cues for to reduced compensatory strategies with hip strengthening exercise.  No adverse effects were noted from today's treatment session    Patient will continue to benefit from skilled PT services to modify and progress therapeutic interventions, address functional mobility deficits, address ROM deficits, address strength deficits, analyze and address soft tissue restrictions, analyze and cue movement patterns, analyze and modify body mechanics/ergonomics, assess and modify postural abnormalities, address imbalance/dizziness and instruct in home and community integration to attain remaining goals. []  See Plan of Care  []  See progress note/recertification  []  See Discharge Summary         Progress towards goals / Updated goals:  Short Term Goals: To be accomplished in 4 weeks:                   SHCRZNW will report compliance with HEP 1x/day to aid in rehabilitation program.                   Status at IE: Patient instructed in and provided written copy of initial Home Exercise Program.                   Current:  Met 3/29/2021                      Patient will increase lumbar ROM to WNL in all planes to aid in completion of ADLs.                  Status at IE: flexion fingertips 5 inches from floor.                   Current: flexion fingertips improved to 2\" inches from floor. noted full pressup WNL 3/19/2021 MET      Long Term Goals: To be accomplished in 8 weeks:                   Patient will increase core/abdominal and  B LE strength to 4+/5 MMT throughout to aid in completion of ADLs.                  Status at IE: core/abdominal 3, right LE 3+, left LE 4.                   Current: 3/8/2021 improved bridge height > 75% distance , MMT : HF right 4+ /5 , left 5/5 , Hamstring 4+/5 right , 5/5 left   Quad , ADD /ABD bilateral 5/5  ER hip 4 right , left 4-/5   Progressing                         Patient will report pain no greater than 0-2/10 throughout entire day to aid in completion of ADLs.                  Status at IE:                    Current:  In-progress, 0-4 at most usually at night ache and sore and keep her awake at night  , progressing , 3/19/2021                      Patent will improve stand/walk tolerance to >30 minutes to be able to complete ADLs.                    Status at IE: stand/walk tolerance <10 minutes.                   Current: pt can stand and walk at commisary x 60 min , and  kitchen and do cooking cleaning x 60 min best if keeps moving then when sit and gets back up stiff . MET 3/8/2021                      Patient will improve FOTO (an established functional score where 100 = no disability) score to 72 points to demonstrate improvement in functional status.                    Status at IE:60                   Current: 55/100 ( 5 point less ) 3/8/2021    PLAN  []  Upgrade activities as tolerated     []  Continue plan of care  []  Update interventions per flow sheet       []  Discharge due to:_  []  Other:_      Robert Pina, PT, DPT 3/29/2021  11:02 AM    Future Appointments   Date Time Provider Herbie Johnston   3/31/2021  9:30 AM [place] [facility]

## 2021-03-31 ENCOUNTER — HOSPITAL ENCOUNTER (OUTPATIENT)
Dept: PHYSICAL THERAPY | Age: 66
Discharge: HOME | End: 2021-03-31
Payer: MEDICARE

## 2021-03-31 PROCEDURE — 97530 THERAPEUTIC ACTIVITIES: CPT

## 2021-03-31 PROCEDURE — 97110 THERAPEUTIC EXERCISES: CPT

## 2021-03-31 NOTE — PROGRESS NOTES
PT DAILY TREATMENT NOTE    Patient Name: Davis Sauer  Date:3/31/2021  : 1955  [x]  Patient  Verified  Payor: Marah Lares / Plan: [place] MEDICARE PART A & B / Product Type: Medicare /    In time: 930  Out time: 0764  Total Treatment Time (min): 48  Total Timed Codes (min): 48  1:1 Treatment Time ( only): 43   Visit #: 12 of 16    Treatment Area: Low back pain [M54.5]    SUBJECTIVE  Pain Level (0-10 scale): 0  Any medication changes, allergies to medications, adverse drug reactions, diagnosis change, or new procedure performed?: [x] No    [] Yes (see summary sheet for update)  Subjective functional status/changes:   [] No changes reported  \"The newer exercises are helping me with the pain at night. Everything seems under pretty good control now. \"    OBJECTIVE    30 min Therapeutic Exercise:  [x] See flow sheet :   Rationale: increase ROM, increase strength and decrease pain to improve the patients ability to complete ADLs  ambulation safety and efficiency in order to improve patient's ability to safely ambulate at home for self care. 13 min Therapeutic Activity:  [x]  See flow sheet :   Rationale: increase ROM, increase strength and improve coordination  to improve the patients ability to Complete ADLS     With   [] TE   [] TA   [] neuro   [] other: Patient Education: [x] Review HEP    [] Progressed/Changed HEP based on:   [] positioning   [] body mechanics   [] transfers   [] heat/ice application    [] other:      Other Objective/Functional Measures: NA     Pain Level (0-10 scale) post treatment: 0    ASSESSMENT/Changes in Function: Patient has been well motivated, diligent and consistent with PT and with HEP such that she has now made sufficient progress towards goals that she is ready for discharge to an independent HEP. Patient responds well to treatment session. No adverse effects were noted from today's treatment session.      []  See Plan of Care  []  See progress note/recertification  [x] See Discharge Summary         Progress towards goals / Updated goals:  Short Term Goals: To be accomplished in 4 weeks:                   TWUXYML will report compliance with HEP 1x/day to aid in rehabilitation program.                   Status at IE: Patient instructed in and provided written copy of initial Home Exercise Program.                   Current:  Met 3/29/2021                      Patient will increase lumbar ROM to WNL in all planes to aid in completion of ADLs.                  Status at IE: flexion fingertips 5 inches from floor.                   Current: flexion fingertips improved to 2\" inches from floor. noted full pressup WNL 3/19/2021 MET      Long Term Goals: To be accomplished in 8 weeks:                   Patient will increase core/abdominal and  B LE strength to 4+/5 MMT throughout to aid in completion of ADLs.                  Status at IE: core/abdominal 3, right LE 3+, left LE 4.                   Current: core/abdominal improved to 4, right LE 4, left LE 4+   3/31/2021                      Patient will report pain no greater than 0-2/10 throughout entire day to aid in completion of ADLs.                  Status at IE:                    Current:  patient reports pain no longer waking her up at night since she has been consistent with newest exercises in HEP 3/31/2021  MET                      Patent will improve stand/walk tolerance to >30 minutes to be able to complete ADLs.                  Status at IE: stand/walk tolerance <10 minutes.                   Current: pt can stand and walk at commisary x 60 min , and  kitchen and do cooking cleaning x 60 min best if keeps moving then when sit and gets back up stiff . MET 3/8/2021                      Patient will improve FOTO (an established functional score where 100 = no disability) score to 72 points to demonstrate improvement in functional status.                    Status at IE:60                   Current: 65 3/31/2021    PLAN  []  Upgrade activities as tolerated     [x]  Continue plan of care  []  Update interventions per flow sheet       [x]  Discharge due to: sufficient progress towards goals  []  Other:_      Maricel Burton PT, DPT 3/31/2021  9:45 AM    No future appointments.

## 2021-03-31 NOTE — PROGRESS NOTES
[facility] at [address] [place] [address] Drive: [phone]   Fax: [phone]  Discharge Summary  Patient Name: Giuseppe Crow : 1955   Medical   Diagnosis: Low back pain [M54.5] Treatment Diagnosis: Low back pain   Onset Date: 2020     Referral Source: Joshua Rodas MD [facility]): 2021   Prior Hospitalization: See medical history Provider #: [id number]   Prior Level of Function: treadmill 3 miles 3-4 x/wk   Comorbidities: OA, HTN, osteoporosis, colon cancer. Surgical Hx: L3-4 discectomy 2019, colon resection, bariatric surgery   Medications: Verified on Patient Summary List      ===========================================================================================  Assessment / Summary of Care:  Giuseppe Crow is a 74 y.o.   female who has been well motivated, diligent and consistent with PT and with HEP such that she has now made sufficient progress towards goals that she is ready for discharge to an independent HEP.     ===========================================================================================    Plan: Discharge to independent [facility]. Progress Towards Goals:     Short Term Goals: To be accomplished in 4 weeks:                   TMAYCDX will report compliance with HEP 1x/day to aid in rehabilitation program.                   Status at IE: Patient instructed in and provided written copy of initial Home Exercise Program.                   Current:  Met 3/29/2021                      Patient will increase lumbar ROM to WNL in all planes to aid in completion of ADLs.                    Status at IE: flexion fingertips 5 inches from floor.                   Current: flexion fingertips improved to 2\" inches from floor. noted full pressup WNL 3/19/2021 MET      Long Term Goals: To be accomplished in 8 weeks:                   Patient will increase core/abdominal and  B LE strength to 4+/5 MMT throughout to aid in completion of ADLs.                  Status at IE: core/abdominal 3, right LE 3+, left LE 4.                   Current: core/abdominal improved to 4, right LE 4, left LE 4+   3/31/2021                      Patient will report pain no greater than 0-2/10 throughout entire day to aid in completion of ADLs.                  Status at IE:                    Current:  patient reports pain no longer waking her up at night since she has been consistent with newest exercises in HEP 3/31/2021  MET                      Patent will improve stand/walk tolerance to >30 minutes to be able to complete ADLs.                  Status at IE: stand/walk tolerance <10 minutes.                   Current: pt can stand and walk at commisary x 60 min , and  kitchen and do cooking cleaning x 60 min best if keeps moving then when sit and gets back up stiff . MET 3/8/2021                      Patient will improve FOTO (an established functional score where 100 = no disability) score to 72 points to demonstrate improvement in functional status.                    Status at IE:60                   Current: 65 3/31/2021      ===========================================================================================    Therapist Signature: Félix Hurt PT, DPMARISELA Date: 3/31/2021     Time: 10:06 AM

## 2022-05-24 ENCOUNTER — APPOINTMENT (OUTPATIENT)
Dept: PHYSICAL THERAPY | Age: 67
End: 2022-05-24

## 2022-06-29 ENCOUNTER — HOSPITAL ENCOUNTER (OUTPATIENT)
Dept: PHYSICAL THERAPY | Age: 67
Discharge: HOME | End: 2022-06-29
Payer: MEDICARE

## 2022-06-29 PROCEDURE — 97161 PT EVAL LOW COMPLEX 20 MIN: CPT

## 2022-06-29 PROCEDURE — 97110 THERAPEUTIC EXERCISES: CPT

## 2022-06-29 NOTE — PROGRESS NOTES
PT DAILY TREATMENT NOTE/LUMBAR EVAL 10-18    Patient Name: Elizabeth Reddy  Date:2022  : 1955  [x]  Patient  Verified  Payor: Saad Albright / Plan: [place] / Product Type: Medicare /    In time:849  Out time:933  Total Treatment Time (min): 25  Visit #: 1 of 16    Medicare/BCBS Only   Total Timed Codes (min):  25 1:1 Treatment Time:  25     Treatment Area: Other low back pain [M54.59]  SUBJECTIVE  Pain Level (0-10 scale): 4 (range 3-7)  []constant [x]intermittent []improving []worsening []no change since onset    Any medication changes, allergies to medications, adverse drug reactions, diagnosis change, or new procedure performed?: [x] No    [] Yes (see summary sheet for update)  Subjective functional status/changes:     Patient has c/c of lumbar pain since third lumbar surgery 3/29/2022 extending prior L3-4 fusion to an additional L4-5 fusion. Patient has also had abdominal hernia surgery further exacerbating pain symptoms. Patient noted significant decrease in pain and improvement in functional activity tolerance with PT intervention in early . 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 Patient now referred for PT to start with aquatics and progress to land exercises as able. Patient describes pain as intermittent sharp pain. Pain is worse in PM. Reports intermittent  numbness/tingling right L4 dermatome. Aggravating factors: long term standing or sitting. Alleviating factors: rest in supine. Denies red flags: SOB, chest pain, dizziness/lightheadedness, blurred/double vision, HA, chills/fevers, night sweats, change in bowel/bladder control, abdominal pain, difficulty swallowing, slurred speech, unexplained weight gain/loss, nausea, vomiting. PMHx: diabetes, OA, HTN, osteoporosis, colon cancer. Surgical Hx: L3-4 discectomy 2019, colon resection, bariatric surgery.  Social Hx: , two level home, social alcohol, no tobacco, has recently retired from work 30 hours per week as . PLOF: treadmill 3 miles 3-4 x/wk.  CLOF: significant difficulty sleeping, unable to participate in cardiovascular exercise at present, stand/walk tolerance <10 minutes.  Diagnostic Imaging: none recent    OBJECTIVE/EXAMINATION    Precautions: none    19 min [x]Eval                  []Re-Eval       25 min Therapeutic Exercise:  [x] See flow sheet :   Rationale: increase ROM, increase strength and decrease pain to improve the patients ability to complete ADLs            With   [] TE   [] TA   [] neuro   [] other: Patient Education: [x] Review HEP    [] Progressed/Changed HEP based on:   [] positioning   [] body mechanics   [] transfers   [] heat/ice application    [] other:      Physical Therapy Evaluation - Lumbar Spine    OBJECTIVE  Posture:  Lateral Shift: [] R    [] L     [] +  [x] -  Kyphosis: [] Increased [] Decreased   [x]  WNL  Lordosis:  [] Increased [x] Decreased   [] WNL  Pelvic symmetry: [] WNL    [] Other:    Gait:  [] Normal     [x] Abnormal: decreased juanita, decreased step length    Active Movements: [] N/A   [] Too acute   [] Other:  ROM Degrees Comments:pain, area   Forward flexion    Fingertips 8 inches from floor   Extension  30  Increased pain   SB right  34    SB left  32    Rotation right  38    Rotation left  42      Repeated Movements: increase pain extension  Side Glide: negative  Sustained passive positioning test: increased pain extension    Neuro Screen [x] WNL  Myotome/Dermatome/Reflexes:  Comments: paresthesias L4 dermatome    Dural Mobility:  SLR Supine: [] R    [] L    [x] +    [] -  @ (degrees): decreased pain with SLR with dorsiflexion  Slump Test: [] R    [] L    [x] +    [] -  @ (degrees): decreased pain  Prone Knee Bend: [] R    [] L    [x] +    [x] - decreased femoral pain    Palpation  [] Min  [x] Mod  [] Severe    Location: right sciatic notch  [] Min  [] Mod  [] Severe    Location:    Strength  Core/abdominal 2/5  Bilateral LE 3+/5    Special Tests  Lumbar:  Lumb. Compression: [x] Pos  [] Neg               Lumbar Distraction:   [] Pos  [x] Neg    Quadrant:  [x] Pos  [] Neg   [] Flex  [x] Ext    Sacroilliac:  Gaenslen's: [] R    [] L    [] +    [x] -     Compression: [] +    [x] -     Gapping:  [] +    [x] -            Hip: Lorrobbie Loree:  [] R    [] L    [] +    [x] -     Scour:  [] R    [] L    [] +    [x] -     Piriformis: [x] R    [] L    [x] +    [] -  Decreased pain with right piriformis stretch    Other tests/comments:       Pain Level (0-10 scale) post treatment: 3    ASSESSMENT/Changes in Function: Patient presents s/p surgical extension of lumbar fusion to L4-5 level performed 3/29/2022 with decreased lumbar AROM, decreased core/abdominal and bilateral LE strength, moderate to severe pain, decreased stand/walk tolerance and moderate difficulty with household ADLs. Patient will continue to benefit from skilled PT services to modify and progress therapeutic interventions, address functional mobility deficits, address ROM deficits, address strength deficits, analyze and address soft tissue restrictions, analyze and cue movement patterns, analyze and modify body mechanics/ergonomics and assess and modify postural abnormalities to attain remaining goals.      [x]  See Plan of Care  []  See progress note/recertification  []  See Discharge Summary         Progress towards goals / Updated goals:  See POC    PLAN  []  Upgrade activities as tolerated     [x]  Continue plan of care  []  Update interventions per flow sheet       []  Discharge due to:_  []  Other:_      Edith Lama, PT 6/29/2022  8:57 AM

## 2022-06-30 ENCOUNTER — HOSPITAL ENCOUNTER (OUTPATIENT)
Dept: PHYSICAL THERAPY | Age: 67
Discharge: HOME | End: 2022-06-30
Payer: MEDICARE

## 2022-06-30 PROCEDURE — 97112 NEUROMUSCULAR REEDUCATION: CPT

## 2022-06-30 PROCEDURE — 97110 THERAPEUTIC EXERCISES: CPT

## 2022-06-30 PROCEDURE — 97530 THERAPEUTIC ACTIVITIES: CPT

## 2022-06-30 NOTE — PROGRESS NOTES
PT DAILY TREATMENT NOTE    Patient Name: Concha Louie  Date:2022  : 1955  [x]  Patient  Verified  Payor: Sara Hazard / Plan: [place] MED[place] PART A & B / Product Type: Medicare /    In time: 993  Out time: 212  Total Treatment Time (min): 52  Total Timed Codes (min): 53  1:1 Treatment Time ( [address] only): 40   Visit #: 2 of 16    Treatment Dx: Other low back pain [M54.59]    SUBJECTIVE  Pain Level (0-10 scale): 0  Any medication changes, allergies to medications, adverse drug reactions, diagnosis change, or new procedure performed?: [x] No    [] Yes (see summary sheet for update)  Subjective functional status/changes:   [] No changes reported  \"I have been doing the stretches you showed me for the initial HEP. They are tough but they are really helping me to feel better. \"    OBJECTIVE    15 min Therapeutic Exercise:  [x] See flow sheet :   Rationale: increase ROM, increase strength and decrease pain to improve the patients ability to complete ADLs  ambulation safety and efficiency in order to improve patient's ability to safely ambulate at home for self care. 15 min Therapeutic Activity:  [x]  See flow sheet :   Rationale: increase ROM, increase strength and improve coordination  to improve the patients ability to Complete ADLS     10 min Neuromuscular Re-education:  [x]  See flow sheet :   Rationale: improve coordination, improve balance and increase proprioception  to improve the patients ability to complete ADLS, and decrease falls risk    With   [] TE   [] TA   [] neuro   [] other: Patient Education: [x] Review HEP    [] Progressed/Changed HEP based on:   [] positioning   [] body mechanics   [] transfers   [] heat/ice application    [] other:      Other Objective/Functional Measures: NA     Pain Level (0-10 scale) post treatment: 0    ASSESSMENT/Changes in Function: Patient reports decreased pain with performance of initial land based HEP.  Patient introduced to Initial Aquatic Exercise Program for Management of Low Back Pain. Patient responds well to treatment session. No adverse effects were noted from today's treatment session. Patient will continue to benefit from skilled PT services to modify and progress therapeutic interventions, address functional mobility deficits, address ROM deficits, address strength deficits, analyze and address soft tissue restrictions, analyze and cue movement patterns, analyze and modify body mechanics/ergonomics, assess and modify postural abnormalities,  and instruct in home and community integration to attain remaining goals. [x]  See Plan of Care  []  See progress note/recertification  []  See Discharge Summary         Progress towards goals / Updated goals:  Short Term Goals: To be accomplished in 4 weeks:                   Patient will report compliance with HEP 1x/day to aid in rehabilitation program.                   Status at IE: Patient instructed in and provided written copy of initial Home Exercise Program.                   Current: Patient introduced to Initial Aquatic Exercise Program for Management of Low Back Pain. 6/30/2022, in progress                      Patient will increase lumbar ROM flexion to fingertips to floor to aid in completion of ADLs. Status at IE: lumbar flexion fingertips 8 inches from floor. Current: Same as IE     Long Term Goals: To be accomplished in 8 weeks:                   Patient will increase Bayron LE strength to 5/5 MMT throughout to aid in completion of ADLs. Status at IE: bilateral LE strength 3+/5                   Current: Same as IE                      Patient will report pain no greater than 1-3/10 throughout entire day to aid in completion of ADLs. Status at IE: 3-7/10                   Current: Same as IE                      Patent will improve stand/walk tolerance to 30 minutes to be able to  complete household ADLs. Status at IE: <10 minutes. Current: Same as IE                      Patient will improve FOTO (an established functional score where 100 = no disability) score to 57 points to demonstrate improvement in functional status.                    Status at IE:52                   Current: Same as IE    PLAN  []  Upgrade activities as tolerated     [x]  Continue plan of care  []  Update interventions per flow sheet       []  Discharge due to:_  []  Other:_      Jaron Solano, PT 6/30/2022  9:56 AM    Future Appointments   Date Time Provider Herbie Johnston   7/5/2022  8:00 AM Vy Huynh [facility]   7/7/2022  8:00 AM Gretchen Grey, PT ELSA [facility]   7/12/2022  8:00 AM Gretchen Grey, PT ELSA [facility]   7/15/2022  8:00 AM Gretchen Grey, PT ELSA [facility]

## 2022-07-05 ENCOUNTER — HOSPITAL ENCOUNTER (OUTPATIENT)
Dept: PHYSICAL THERAPY | Age: 67
Discharge: HOME | End: 2022-07-05
Payer: MEDICARE

## 2022-07-05 PROCEDURE — 97530 THERAPEUTIC ACTIVITIES: CPT

## 2022-07-05 PROCEDURE — 97112 NEUROMUSCULAR REEDUCATION: CPT

## 2022-07-05 PROCEDURE — 97110 THERAPEUTIC EXERCISES: CPT

## 2022-07-05 NOTE — PROGRESS NOTES
PT DAILY TREATMENT NOTE    Patient Name: Humble Yañez  Date:2022  : 1955  [x]  Patient  Verified  Payor:  Ví[employer]ore[employer]iel / Plan: [place] MEDICARE PART A & B / Product Type: Medicare /    In time: 087  Out time: 856  Total Treatment Time (min): 52  Total Timed Codes (min): 52  1:1 Treatment Time ( [address] only): 41   Visit #: 3 of 16    Treatment Dx: Other low back pain [M54.59]    SUBJECTIVE  Pain Level (0-10 scale): 0  Any medication changes, allergies to medications, adverse drug reactions, diagnosis change, or new procedure performed?: [x] No    [] Yes (see summary sheet for update)  Subjective functional status/changes:   [] No changes reported  \"As long as I keep up with the stretches you showed me, I am having no back pain at all. I was very tired after the first workout in the pool. \"    OBJECTIVE    15 min Therapeutic Exercise:  [x] See flow sheet :   Rationale: increase ROM, increase strength and decrease pain to improve the patients ability to complete ADLs  ambulation safety and efficiency in order to improve patient's ability to safely ambulate at home for self care. 15 min Therapeutic Activity:  [x]  See flow sheet :   Rationale: increase ROM, increase strength and improve coordination  to improve the patients ability to Complete ADLS     11 min Neuromuscular Re-education:  [x]  See flow sheet :   Rationale: improve coordination, improve balance and increase proprioception  to improve the patients ability to complete ADLS, and decrease falls risk    With   [] TE   [] TA   [] neuro   [] other: Patient Education: [x] Review HEP    [] Progressed/Changed HEP based on:   [] positioning   [] body mechanics   [] transfers   [] heat/ice application    [] other:      Other Objective/Functional Measures: NA     Pain Level (0-10 scale) post treatment: 0    ASSESSMENT/Changes in Function: Patient noting marked decrease in overall pain intensity but fatigues quickly with exercises due to deconditioning. Focusing on increasing patient endurance to increase overall activity tolerance. Patient responds well to treatment session. No adverse effects were noted from today's treatment session. Patient will continue to benefit from skilled PT services to modify and progress therapeutic interventions, address functional mobility deficits, address ROM deficits, address strength deficits, analyze and address soft tissue restrictions, analyze and cue movement patterns, analyze and modify body mechanics/ergonomics, assess and modify postural abnormalities,  and instruct in home and community integration to attain remaining goals. [x]  See Plan of Care  []  See progress note/recertification  []  See Discharge Summary         Progress towards goals / Updated goals:  Short Term Goals: To be accomplished in 4 weeks:                   HMWZLIR will report compliance with HEP 1x/day to aid in rehabilitation program.                   Status at IE: Patient instructed in and provided written copy of initial Home Exercise Program.                   Current: Patient introduced to Initial Aquatic Exercise Program for Management of Low Back Pain. 6/30/2022, in progress                      Patient will increase lumbar ROM flexion to fingertips to floor to aid in completion of ADLs.                  Status at IE: lumbar flexion fingertips 8 inches from floor.                   Current: Same as IE     Long Term Goals: To be accomplished in 8 weeks:                   Patient will increase Bayron LE strength to 5/5 MMT throughout to aid in completion of ADLs.                  Status at IE: bilateral LE strength 3+/5                   Current: Same as IE                      Patient will report pain no greater than 1-3/10 throughout entire day to aid in completion of ADLs.                    Status at IE: 3-7/10                   Current: Same as IE                      Patent will improve stand/walk tolerance to 30 minutes to be able to  complete household ADLs.                  Status at IE: <10 minutes.                   Current: Same as IE                      Patient will improve FOTO (an established functional score where 100 = no disability) score to 57 points to demonstrate improvement in functional status.                    Status at IE:52                   Current: Same as IE    PLAN  []  Upgrade activities as tolerated     [x]  Continue plan of care  []  Update interventions per flow sheet       []  Discharge due to:_  []  Other:_      Yumiko Payor, PT 7/5/2022  8:24 AM    Future Appointments   Date Time Provider Herbie Johnston   7/7/2022  8:00 AM Ezekiel Leonardo, PT [email or web address]   7/12/2022  8:00 AM Ezekiel Leonardo, PT [facility]   7/15/2022  8:00 AM Ezekiel Leonardo, PT [facility]

## 2022-07-07 ENCOUNTER — HOSPITAL ENCOUNTER (OUTPATIENT)
Dept: PHYSICAL THERAPY | Age: 67
Discharge: HOME | End: 2022-07-07
Payer: MEDICARE

## 2022-07-07 PROCEDURE — 97110 THERAPEUTIC EXERCISES: CPT

## 2022-07-07 PROCEDURE — 97112 NEUROMUSCULAR REEDUCATION: CPT

## 2022-07-07 PROCEDURE — 97530 THERAPEUTIC ACTIVITIES: CPT

## 2022-07-07 NOTE — PROGRESS NOTES
PT DAILY TREATMENT NOTE    Patient Name: Aggie Wise  Date:2022  : 1955  [x]  Patient  Verified  Payor: Nish Benton / Plan: [place] MEDICARE PART A & B / Product Type: Medicare /    In time: 800  Out time: 858  Total Treatment Time (min): 58  Total Timed Codes (min): 58  1:1 Treatment Time ( [address] only): 46   Visit #: 4 of 16    Treatment Dx: Other low back pain [M54.59]    SUBJECTIVE  Pain Level (0-10 scale): 2  Any medication changes, allergies to medications, adverse drug reactions, diagnosis change, or new procedure performed?: [x] No    [] Yes (see summary sheet for update)  Subjective functional status/changes:   [] No changes reported  \"I woke up with a little bit of pain in the front of the right leg this morning. \"    OBJECTIVE    16 min Therapeutic Exercise:  [x] See flow sheet :   Rationale: increase ROM, increase strength and decrease pain to improve the patients ability to complete ADLs  ambulation safety and efficiency in order to improve patient's ability to safely ambulate at home for self care. 15 min Therapeutic Activity:  [x]  See flow sheet :   Rationale: increase ROM, increase strength and improve coordination  to improve the patients ability to Complete ADLS     15 min Neuromuscular Re-education:  [x]  See flow sheet :   Rationale: improve coordination, improve balance and increase proprioception  to improve the patients ability to complete ADLS, and decrease falls risk    With   [] TE   [] TA   [] neuro   [] other: Patient Education: [x] Review HEP    [] Progressed/Changed HEP based on:   [] positioning   [] body mechanics   [] transfers   [] heat/ice application    [] other:      Other Objective/Functional Measures: NA     Pain Level (0-10 scale) post treatment: 0    ASSESSMENT/Changes in Function: Patient requires frequent verbal and visual cues for correction of technique and for sequencing of aquatic exercises. Patient responds well to treatment session.   No adverse effects were noted from today's treatment session. Patient will continue to benefit from skilled PT services to modify and progress therapeutic interventions, address functional mobility deficits, address ROM deficits, address strength deficits, analyze and address soft tissue restrictions, analyze and cue movement patterns, analyze and modify body mechanics/ergonomics, assess and modify postural abnormalities,  and instruct in home and community integration to attain remaining goals. [x]  See Plan of Care  []  See progress note/recertification  []  See Discharge Summary         Progress towards goals / Updated goals:  Short Term Goals: To be accomplished in 4 weeks:                   UTGTYZK will report compliance with HEP 1x/day to aid in rehabilitation program.                   Status at IE: Patient instructed in and provided written copy of initial Home Exercise Program.                   Current: Patient introduced to Initial Aquatic Exercise Program for Management of Low Back Pain. 6/30/2022, in progress                      Patient will increase lumbar ROM flexion to fingertips to floor to aid in completion of ADLs.                  Status at IE: lumbar flexion fingertips 8 inches from floor.                   Current: lumbar flexion fingertips 7 inches from floor. 7/7/2022, in progress     Long Term Goals: To be accomplished in 8 weeks:                   Patient will increase Bayron LE strength to 5/5 MMT throughout to aid in completion of ADLs.                  Status at IE: bilateral LE strength 3+/5                   Current: Same as IE                      Patient will report pain no greater than 1-3/10 throughout entire day to aid in completion of ADLs.                  Status at IE: 3-7/10                   Current: Same as IE                      Patent will improve stand/walk tolerance to 30 minutes to be able to  complete household ADLs.                    Status at IE: <10 minutes.                   Current: Same as IE                      Patient will improve FOTO (an established functional score where 100 = no disability) score to 57 points to demonstrate improvement in functional status.                    Status at IE:52                   Current: Same as IE    PLAN  []  Upgrade activities as tolerated     [x]  Continue plan of care  []  Update interventions per flow sheet       []  Discharge due to:_  []  Other:_      Edith Lama, PT 7/7/2022  8:17 AM    Future Appointments   Date Time Provider Herbie Johnston   7/12/2022  8:00 AM Gabino Ghotra [facility]   7/15/2022  8:00 AM Jamie Ghotra, PT [facility]

## 2022-07-12 ENCOUNTER — HOSPITAL ENCOUNTER (OUTPATIENT)
Dept: PHYSICAL THERAPY | Age: 67
Discharge: HOME | End: 2022-07-12
Payer: MEDICARE

## 2022-07-12 PROCEDURE — 97112 NEUROMUSCULAR REEDUCATION: CPT

## 2022-07-12 PROCEDURE — 97530 THERAPEUTIC ACTIVITIES: CPT

## 2022-07-12 PROCEDURE — 97110 THERAPEUTIC EXERCISES: CPT

## 2022-07-12 NOTE — PROGRESS NOTES
PT DAILY TREATMENT NOTE    Patient Name: Yancey Goltz  Date:2022  : 1955  [x]  Patient  Verified  Payor: Jonas Redding / Plan: [place] / Product Type: Medicare /    In time: 710  Out time: 903  Total Treatment Time (min): 56  Total Timed Codes (min): 56  1:1 Treatment Time ( [address] only): 40   Visit #: 5 of 16    Treatment Dx: Other low back pain [M54.59]    SUBJECTIVE  Pain Level (0-10 scale): 2  Any medication changes, allergies to medications, adverse drug reactions, diagnosis change, or new procedure performed?: [x] No    [] Yes (see summary sheet for update)  Subjective functional status/changes:   [] No changes reported  \"I am fine with standing. But, if I sit for any length of time I get pain in back of right hip shooting down right thigh and outside of right thigh is tender to touch. \"    OBJECTIVE    15 min Therapeutic Exercise:  [x] See flow sheet :   Rationale: increase ROM, increase strength and decrease pain to improve the patients ability to complete ADLs  ambulation safety and efficiency in order to improve patient's ability to safely ambulate at home for self care.         15 min Therapeutic Activity:  [x]  See flow sheet :   Rationale: increase ROM, increase strength and improve coordination  to improve the patients ability to Complete ADLS     10 min Neuromuscular Re-education:  [x]  See flow sheet :   Rationale: improve coordination, improve balance and increase proprioception  to improve the patients ability to complete ADLS, and decrease falls risk    With   [] TE   [] TA   [] neuro   [] other: Patient Education: [x] Review HEP    [] Progressed/Changed HEP based on:   [] positioning   [] body mechanics   [] transfers   [] heat/ice application    [] other:      Other Objective/Functional Measures: NA     Pain Level (0-10 scale) post treatment: 0    ASSESSMENT/Changes in Function: Patient provided information regarding piriformis syndrome and ITB syndrome and appropriate stretches to address right hip/thigh pain. Patient responds well to treatment session. No adverse effects were noted from today's treatment session. Patient will continue to benefit from skilled PT services to modify and progress therapeutic interventions, address functional mobility deficits, address ROM deficits, address strength deficits, analyze and address soft tissue restrictions, analyze and cue movement patterns, analyze and modify body mechanics/ergonomics, assess and modify postural abnormalities,  and instruct in home and community integration to attain remaining goals. [x]  See Plan of Care  []  See progress note/recertification  []  See Discharge Summary         Progress towards goals / Updated goals:  Short Term Goals: To be accomplished in 4 weeks:                   VYDBTMZ will report compliance with HEP 1x/day to aid in rehabilitation program.                   Status at IE: Patient instructed in and provided written copy of initial Home Exercise Program.                   Current: Patient introduced to Initial Aquatic Exercise Program for Management of Low Back Pain. 6/30/2022, in progress                      Patient will increase lumbar ROM flexion to fingertips to floor to aid in completion of ADLs.                  Status at IE: lumbar flexion fingertips 8 inches from floor.                   Current: lumbar flexion fingertips 7 inches from floor. 7/7/2022, in progress     Long Term Goals: To be accomplished in 8 weeks:                   Patient will increase Bayron LE strength to 5/5 MMT throughout to aid in completion of ADLs.                  Status at IE: bilateral LE strength 3+/5                   Current: Same as IE                      Patient will report pain no greater than 1-3/10 throughout entire day to aid in completion of ADLs.                    Status at IE: 3-7/10                   Current: Same as IE                      Patent will improve stand/walk tolerance to 30 minutes to be able to  complete household ADLs.                  Status at IE: <10 minutes.                   Current: Same as IE                      Patient will improve FOTO (an established functional score where 100 = no disability) score to 57 points to demonstrate improvement in functional status.                    Status at IE:52                   Current: Same as IE       PLAN  []  Upgrade activities as tolerated     [x]  Continue plan of care  []  Update interventions per flow sheet       []  Discharge due to:_  []  Other:_      Stefan Khan, PT 7/12/2022  9:05 AM    Future Appointments   Date Time Provider Herbie Johnston   7/15/2022  8:00 AM Teri Ghotra THE [facility]

## 2022-07-15 ENCOUNTER — HOSPITAL ENCOUNTER (OUTPATIENT)
Dept: PHYSICAL THERAPY | Age: 67
Discharge: HOME | End: 2022-07-15
Payer: MEDICARE

## 2022-07-15 PROCEDURE — 97112 NEUROMUSCULAR REEDUCATION: CPT

## 2022-07-15 PROCEDURE — 97530 THERAPEUTIC ACTIVITIES: CPT

## 2022-07-15 PROCEDURE — 97110 THERAPEUTIC EXERCISES: CPT

## 2022-07-15 NOTE — PROGRESS NOTES
PT DAILY TREATMENT NOTE    Patient Name: Alice Naik  Date:7/15/2022  : 1955  [x]  Patient  Verified  Payor: Jose L Alt / Plan: [place] MEDICARE PART A & B / Product Type: Medicare /    In time: 800  Out time: 905  Total Treatment Time (min): 65  Total Timed Codes (min): 60  1:1 Treatment Time ([facility] only): 40   Visit #: 6 of 16    Treatment Dx: Other low back pain [M54.59]    SUBJECTIVE  Pain Level (0-10 scale): 0  Any medication changes, allergies to medications, adverse drug reactions, diagnosis change, or new procedure performed?: [x] No    [] Yes (see summary sheet for update)  Subjective functional status/changes:   [] No changes reported  \"I have no pain right now, but I do still have some pain off and on. \"    OBJECTIVE    15 min Therapeutic Exercise:  [x] See flow sheet :   Rationale: increase ROM, increase strength and decrease pain to improve the patients ability to complete ADLs  ambulation safety and efficiency in order to improve patient's ability to safely ambulate at home for self care. 15 min Therapeutic Activity:  [x]  See flow sheet :   Rationale: increase ROM, increase strength and improve coordination  to improve the patients ability to Complete ADLS     10 min Neuromuscular Re-education:  [x]  See flow sheet :   Rationale: improve coordination, improve balance and increase proprioception  to improve the patients ability to complete ADLS, and decrease falls risk    With   [] TE   [] TA   [] neuro   [] other: Patient Education: [x] Review HEP    [] Progressed/Changed HEP based on:   [] positioning   [] body mechanics   [] transfers   [] heat/ice application    [] other:      Other Objective/Functional Measures: NA     Pain Level (0-10 scale) post treatment: 0    ASSESSMENT/Changes in Function: Patient progressing in level of independence with Aquatic Exercises and states she is planning to join pool to continue exercises after discharge.  Patient responds well to treatment session. No adverse effects were noted from today's treatment session. Patient will continue to benefit from skilled PT services to modify and progress therapeutic interventions, address functional mobility deficits, address ROM deficits, address strength deficits, analyze and address soft tissue restrictions, analyze and cue movement patterns, analyze and modify body mechanics/ergonomics, assess and modify postural abnormalities,  and instruct in home and community integration to attain remaining goals. [x]  See Plan of Care  []  See progress note/recertification  []  See Discharge Summary         Progress towards goals / Updated goals:  Short Term Goals: To be accomplished in 4 weeks:                   LUCIA will report compliance with HEP 1x/day to aid in rehabilitation program.                   Status at IE: Patient instructed in and provided written copy of initial Home Exercise Program.                   Current: Patient introduced to Initial Aquatic Exercise Program for Management of Low Back Pain. 6/30/2022, in progress                      Patient will increase lumbar ROM flexion to fingertips to floor to aid in completion of ADLs.                  Status at IE: lumbar flexion fingertips 8 inches from floor.                   Current: lumbar flexion fingertips 7 inches from floor.    7/7/2022, in progress     Long Term Goals: To be accomplished in 8 weeks:                   Patient will increase Bayron LE strength to 5/5 MMT throughout to aid in completion of ADLs.                  Status at IE: bilateral LE strength 3+/5                   Current: Same as IE                      Patient will report pain no greater than 1-3/10 throughout entire day to aid in completion of ADLs.                    Status at IE: 3-7/10                   Current: Pain level improved to 0-4/10.    7/15/2022, in progress                      Patent will improve stand/walk tolerance to 30 minutes to be able to  complete household ADLs.                  Status at IE: <10 minutes.                   Current: Same as IE                      Patient will improve FOTO (an established functional score where 100 = no disability) score to 57 points to demonstrate improvement in functional status.                    Status at IE:52                   Current: Same as IE       PLAN  []  Upgrade activities as tolerated     [x]  Continue plan of care  []  Update interventions per flow sheet       []  Discharge due to:_  []  Other:_      Jose Eduardo Gonzales, JANNY 7/15/2022  9:18 AM    Future Appointments   Date Time Provider Herbie Johnston   7/18/2022  9:30 AM Roberta Samuel, JANNY HUNTER THE [facility]   8/2/2022  8:00 AM JANNY Urban [facility]   8/9/2022  8:00 AM Augustin Ghotra THE [facility]   8/11/2022  8:45 AM JANNY Urban THE [facility]

## 2022-07-18 ENCOUNTER — APPOINTMENT (OUTPATIENT)
Dept: PHYSICAL THERAPY | Age: 67
End: 2022-07-18
Payer: MEDICARE

## 2022-07-18 ENCOUNTER — TELEPHONE (OUTPATIENT)
Dept: PHYSICAL THERAPY | Age: 67
End: 2022-07-18

## 2022-07-26 ENCOUNTER — TELEPHONE (OUTPATIENT)
Dept: PHYSICAL THERAPY | Age: 67
End: 2022-07-26

## 2022-08-02 ENCOUNTER — HOSPITAL ENCOUNTER (OUTPATIENT)
Dept: PHYSICAL THERAPY | Age: 67
Discharge: HOME | End: 2022-08-02
Payer: MEDICARE

## 2022-08-02 PROCEDURE — 97112 NEUROMUSCULAR REEDUCATION: CPT

## 2022-08-02 PROCEDURE — 97110 THERAPEUTIC EXERCISES: CPT

## 2022-08-02 PROCEDURE — 97530 THERAPEUTIC ACTIVITIES: CPT

## 2022-08-02 NOTE — PROGRESS NOTES
PT DAILY TREATMENT NOTE    Patient Name: Jaycee Oro  Date:2022  : 1955  [x]  Patient  Verified  Payor: Eva Estrada / Plan: [place] MEDICARE PART A & B / Product Type: Medicare /    In time: 646  Out time: 686  Total Treatment Time (min): 59  Total Timed Codes (min): 59  1:1 Treatment Time ( [address] only): 40   Visit #: 7 of 16    Treatment Dx: Other low back pain [M54.59]    SUBJECTIVE  Pain Level (0-10 scale): 3  Any medication changes, allergies to medications, adverse drug reactions, diagnosis change, or new procedure performed?: [x] No    [] Yes (see summary sheet for update)  Subjective functional status/changes:   [] No changes reported  \"I was pretty active while I was away, more walking, and sitting on floor with grandchildren, so the pain in the front of the thigh has been stronger and more consistent lately. But, the aquatic therapy really helps it to calm down. \"    OBJECTIVE    15 min Therapeutic Exercise:  [x] See flow sheet :   Rationale: increase ROM, increase strength and decrease pain to improve the patients ability to complete ADLs  ambulation safety and efficiency in order to improve patient's ability to safely ambulate at home for self care.         15 min Therapeutic Activity:  [x]  See flow sheet :   Rationale: increase ROM, increase strength and improve coordination  to improve the patients ability to Complete ADLS     40 min Neuromuscular Re-education:  [x]  See flow sheet :   Rationale: improve coordination, improve balance and increase proprioception  to improve the patients ability to complete ADLS, and decrease falls risk    With   [] TE   [] TA   [] neuro   [] other: Patient Education: [x] Review HEP    [] Progressed/Changed HEP based on:   [] positioning   [] body mechanics   [] transfers   [] heat/ice application    [] other:      Other Objective/Functional Measures: NA     Pain Level (0-10 scale) post treatment: 0    ASSESSMENT/Changes in Function: Patient is demonstrating a good response to aquatic therapy with progressive decrease in pain intensity noted as aquatic exercises are advanced. Patient has noted some regression of symptoms with missing therapy past week and increased activity with travelling. Trunk AROM, strength and activity tolerance are steadily improving. Patient responds well to treatment session. No adverse effects were noted from today's treatment session. Patient will continue to benefit from skilled PT services to modify and progress therapeutic interventions, address functional mobility deficits, address ROM deficits, address strength deficits, analyze and address soft tissue restrictions, analyze and cue movement patterns, analyze and modify body mechanics/ergonomics, assess and modify postural abnormalities,  and instruct in home and community integration to attain remaining goals. []  See Plan of Care  [x]  See progress note/recertification  []  See Discharge Summary         Progress towards goals / Updated goals:  Short Term Goals: To be accomplished in 4 weeks:                   Patient will report compliance with HEP 1x/day to aid in rehabilitation program.                   Status at IE: Patient instructed in and provided written copy of initial Home Exercise Program.                   Current: Patient progressing in level of independence with Comprehensive Aquatic Exercise Program for Management of Low Back Pain. 8/2/2022, in progress                      Patient will increase lumbar ROM flexion to fingertips to floor to aid in completion of ADLs. Status at IE: lumbar flexion fingertips 8 inches from floor. Current: lumbar flexion fingertips 6 inches from floor. 8/2/2022, in progress     Long Term Goals: To be accomplished in 8 weeks:                   Patient will increase Bayron LE strength to 5/5 MMT throughout to aid in completion of ADLs.                    Status at IE: bilateral LE strength 3+/5 Current: bilateral LE strength improved to 4/5.        8/2/2022, in progress                      Patient will report pain no greater than 1-3/10 throughout entire day to aid in completion of ADLs. Status at IE: 3-7/10                   Current: Pain level improved to 0-4/10.    7/15/2022, in progress                      Patent will improve stand/walk tolerance to 30 minutes to be able to  complete household ADLs. Status at IE: <10 minutes. Current: Stand/walk tolerance improved to 10 to 15 minutes. 8/2/2022, in progress                      Patient will improve FOTO (an established functional score where 100 = no disability) score to 57 points to demonstrate improvement in functional status. Status at IE:52                   Current: To be assessed next session.             8/2/2022, in progress    PLAN  []  Upgrade activities as tolerated     [x]  Continue plan of care  []  Update interventions per flow sheet       []  Discharge due to:_  []  Other:_      Jose Eduardo Gonzales, PT 8/2/2022  8:24 AM    Future Appointments   Date Time Provider Herbie Johnston   8/9/2022  8:00 AM Roberta Samuel, PT ELSA [facility]   8/11/2022  8:45 AM Boubacar Ghotra, PT [facility]

## 2022-08-02 NOTE — PROGRESS NOTES
[facility] at [address] [place] [address] Drive: [phone]   Fax: [phone]  Progress Note  Patient Name: Pelon Bauman : 1955   Medical  Diagnosis: Other low back pain [M54.59] Treatment Diagnosis: Low back pain   Onset Date: 3/29/2022       Referral Source: [facility]): 2022   Prior Hospitalization: See medical history Provider #: [id number]   Prior Level of Function: treadmill 3 miles 3-4 x/wk   Comorbidities: diabetes, OA, HTN, osteoporosis, colon cancer, lumbar fusion L3-4   Medications: Verified on Patient Summary List      ===========================================================================================  Assessment / Summary of Care:  Pelon Bauman is a 69 y.o.  female who is demonstrating a good response to aquatic therapy with progressive decrease in pain intensity noted as aquatic exercises are advanced. Patient has noted some regression of symptoms with missing therapy past week and increased activity with travelling. Trunk AROM, strength and activity tolerance are steadily improving. Patient will continue to benefit from skilled PT services to modify and progress therapeutic interventions, address functional mobility deficits, address ROM deficits, address strength deficits, analyze and address soft tissue restrictions, analyze and cue movement patterns, analyze and modify body mechanics/ergonomics, assess and modify postural abnormalities,  and instruct in home and community integration to attain remaining goals.    ===========================================================================================    Plan:Continue therapy per initial plan/protocol at a frequency of  2 x per week for 4 weeks    Progress Towards Goals:   Short Term Goals:  To be accomplished in 4 weeks:                   Patient will report compliance with HEP 1x/day to aid in rehabilitation program.                   Status at IE: Patient instructed in and provided written copy of initial Home Exercise Program.                   Current: Patient progressing in level of independence with Comprehensive Aquatic Exercise Program for Management of Low Back Pain. 8/2/2022, in progress                      Patient will increase lumbar ROM flexion to fingertips to floor to aid in completion of ADLs. Status at IE: lumbar flexion fingertips 8 inches from floor. Current: lumbar flexion fingertips 6 inches from floor. 8/2/2022, in progress     Long Term Goals: To be accomplished in 8 weeks:                   Patient will increase Bayron LE strength to 5/5 MMT throughout to aid in completion of ADLs. Status at IE: bilateral LE strength 3+/5                   Current: bilateral LE strength improved to 4/5.        8/2/2022, in progress                      Patient will report pain no greater than 1-3/10 throughout entire day to aid in completion of ADLs. Status at IE: 3-7/10                   Current: Pain level improved to 0-4/10.    7/15/2022, in progress                      Patent will improve stand/walk tolerance to 30 minutes to be able to  complete household ADLs. Status at IE: <10 minutes. Current: Stand/walk tolerance improved to 10 to 15 minutes. 8/2/2022, in progress                      Patient will improve FOTO (an established functional score where 100 = no disability) score to 57 points to demonstrate improvement in functional status. Status at IE:52                   Current: To be reassessed next session.             8/2/2022, in progress    ===========================================================================================  Subjective: \"I was pretty active while I was away, more walking, and sitting on floor with grandchildren, so the pain in the front of the thigh has been stronger and more consistent lately. But, the aquatic therapy really helps it to calm down. \"        Therapist Signature: Lane Lam PT, DPT Date: 8/2/0057   Re-Certification: NA Time: 9:19 AM         [facility] at [facility], [address] [place] [address]                    Phone: [phone]   Fax: [phone]  .

## 2022-08-09 ENCOUNTER — HOSPITAL ENCOUNTER (OUTPATIENT)
Dept: PHYSICAL THERAPY | Age: 67
Discharge: HOME | End: 2022-08-09
Payer: MEDICARE

## 2022-08-09 PROCEDURE — 97110 THERAPEUTIC EXERCISES: CPT

## 2022-08-09 PROCEDURE — 97530 THERAPEUTIC ACTIVITIES: CPT

## 2022-08-09 PROCEDURE — 97112 NEUROMUSCULAR REEDUCATION: CPT

## 2022-08-11 ENCOUNTER — HOSPITAL ENCOUNTER (OUTPATIENT)
Dept: PHYSICAL THERAPY | Age: 67
Discharge: HOME | End: 2022-08-11
Payer: MEDICARE

## 2022-08-11 PROCEDURE — 97530 THERAPEUTIC ACTIVITIES: CPT

## 2022-08-11 PROCEDURE — 97112 NEUROMUSCULAR REEDUCATION: CPT

## 2022-08-11 PROCEDURE — 97110 THERAPEUTIC EXERCISES: CPT

## 2022-08-11 NOTE — PROGRESS NOTES
PT DAILY TREATMENT NOTE    Patient Name: Jeffory Boast  Date:2022  : 1955  [x]  Patient  Verified  Payor: Ginette Aguayo / Plan: [place] MEDICARE PART A & B / Product Type: Medicare /    In time: 572  Out time: 850  Total Treatment Time (min): 52  Total Timed Codes (min): 52  1:1 Treatment Time ( [address] only): 40   Visit #: 9 of 16    Treatment Dx: Other low back pain [M54.59]    SUBJECTIVE  Pain Level (0-10 scale): 1-2  Any medication changes, allergies to medications, adverse drug reactions, diagnosis change, or new procedure performed?: [x] No    [] Yes (see summary sheet for update)  Subjective functional status/changes:   [] No changes reported  \"I woke up with a fair amount of pain on right side of low back today. I must have slept wrong. It started to ease up after I got up and started moving around. \"    OBJECTIVE    15 min Therapeutic Exercise:  [x] See flow sheet :   Rationale: increase ROM, increase strength and decrease pain to improve the patients ability to complete ADLs  ambulation safety and efficiency in order to improve patient's ability to safely ambulate at home for self care.         15 min Therapeutic Activity:  [x]  See flow sheet :   Rationale: increase ROM, increase strength and improve coordination  to improve the patients ability to Complete ADLS     10 min Neuromuscular Re-education:  [x]  See flow sheet :   Rationale: improve coordination, improve balance and increase proprioception  to improve the patients ability to complete ADLS, and decrease falls risk    With   [] TE   [] TA   [] neuro   [] other: Patient Education: [x] Review HEP    [] Progressed/Changed HEP based on:   [] positioning   [] body mechanics   [] transfers   [] heat/ice application    [] other:      Other Objective/Functional Measures: NA     Pain Level (0-10 scale) post treatment: 0    ASSESSMENT/Changes in Function: Patient reports noting improved flexibility and improved ability to perform routine ADLs after each session. Patient responds well to treatment session. No adverse effects were noted from today's treatment session. Patient will continue to benefit from skilled PT services to modify and progress therapeutic interventions, address functional mobility deficits, address ROM deficits, address strength deficits, analyze and address soft tissue restrictions, analyze and cue movement patterns, analyze and modify body mechanics/ergonomics, assess and modify postural abnormalities,  and instruct in home and community integration to attain remaining goals. [x]  See Plan of Care  []  See progress note/recertification  []  See Discharge Summary         Progress towards goals / Updated goals:  Short Term Goals: To be accomplished in 4 weeks:                   Patient will report compliance with HEP 1x/day to aid in rehabilitation program.                   Status at IE: Patient instructed in and provided written copy of initial Home Exercise Program.                   Current: Patient progressing in level of independence with Comprehensive Aquatic Exercise Program for Management of Low Back Pain. 8/2/2022, in progress                      Patient will increase lumbar ROM flexion to fingertips to floor to aid in completion of ADLs. Status at IE: lumbar flexion fingertips 8 inches from floor. Current: lumbar flexion fingertips 6 inches from floor. 8/2/2022, in progress     Long Term Goals: To be accomplished in 8 weeks:                   Patient will increase Bayron LE strength to 5/5 MMT throughout to aid in completion of ADLs. Status at IE: bilateral LE strength 3+/5                   Current: bilateral LE strength improved to 4/5.        8/2/2022, in progress                      Patient will report pain no greater than 1-3/10 throughout entire day to aid in completion of ADLs.                    Status at IE: 3-7/10                   Current: Pain level improved to 0-3/10.    8/11/2022, in progress                      Patent will improve stand/walk tolerance to 30 minutes to be able to  complete household ADLs. Status at IE: <10 minutes. Current: Stand/walk tolerance improved to 10 to 15 minutes. 8/2/2022, in progress                      Patient will improve FOTO (an established functional score where 100 = no disability) score to 57 points to demonstrate improvement in functional status.                    Status at IE:52                   Current: 53           8/9/2022, in progress    PLAN  []  Upgrade activities as tolerated     [x]  Continue plan of care  []  Update interventions per flow sheet       []  Discharge due to:_  []  Other:_      Ulysses Sang, PT 8/11/2022  8:29 AM    Future Appointments   Date Time Provider Herbie Johnston   8/11/2022  8:45 AM Vy Hernandez THE [facility]   8/19/2022  8:00 AM Martinez Maloney PT [facility]

## 2022-08-18 ENCOUNTER — HOSPITAL ENCOUNTER (OUTPATIENT)
Dept: PHYSICAL THERAPY | Age: 67
Discharge: HOME | End: 2022-08-18
Payer: MEDICARE

## 2022-08-18 PROCEDURE — 97530 THERAPEUTIC ACTIVITIES: CPT

## 2022-08-18 PROCEDURE — 97112 NEUROMUSCULAR REEDUCATION: CPT

## 2022-08-18 PROCEDURE — 97110 THERAPEUTIC EXERCISES: CPT

## 2022-08-18 NOTE — PROGRESS NOTES
PT DAILY TREATMENT NOTE    Patient Name: Alice Naik  Date:2022  : 1955  [x]  Patient  Verified  Payor: Jose L Alt / Plan: [place] / Product Type: Medicare /    In time: 365  Out time: 471  Total Treatment Time (min): 44  Total Timed Codes (min): 44  1:1 Treatment Time ( [address] only): 44   Visit #: 10 of 16    Treatment Dx: Other low back pain [M54.59]    SUBJECTIVE  Pain Level (0-10 scale): 2  Any medication changes, allergies to medications, adverse drug reactions, diagnosis change, or new procedure performed?: [x] No    [] Yes (see summary sheet for update)  Subjective functional status/changes:   [] No changes reported  \"My pain is worst at night. A lot of pain in front of right thigh. But, the water is great. Exercising in the water takes all my pain away. \"    OBJECTIVE    17 min Therapeutic Exercise:  [x] See flow sheet :   Rationale: increase ROM, increase strength and decrease pain to improve the patients ability to complete ADLs  ambulation safety and efficiency in order to improve patient's ability to safely ambulate at home for self care. 15 min Therapeutic Activity:  [x]  See flow sheet :   Rationale: increase ROM, increase strength and improve coordination  to improve the patients ability to Complete ADLS     12 min Neuromuscular Re-education:  [x]  See flow sheet :   Rationale: improve coordination, improve balance and increase proprioception  to improve the patients ability to complete ADLS, and decrease falls risk    With   [] TE   [] TA   [] neuro   [] other: Patient Education: [x] Review HEP    [] Progressed/Changed HEP based on:   [] positioning   [] body mechanics   [] transfers   [] heat/ice application    [] other:      Other Objective/Functional Measures: NA     Pain Level (0-10 scale) post treatment: 0    ASSESSMENT/Changes in Function: Patient advanced to increased resistance for walking exercises by using kickboard for added resistance.  No increased pain noted with addition of increased resistance. Patient responds well to treatment session. No adverse effects were noted from today's treatment session. Patient will continue to benefit from skilled PT services to modify and progress therapeutic interventions, address functional mobility deficits, address ROM deficits, address strength deficits, analyze and address soft tissue restrictions, analyze and cue movement patterns, analyze and modify body mechanics/ergonomics, assess and modify postural abnormalities,  and instruct in home and community integration to attain remaining goals. [x]  See Plan of Care  []  See progress note/recertification  []  See Discharge Summary         Progress towards goals / Updated goals:  Short Term Goals: To be accomplished in 4 weeks:                   Patient will report compliance with HEP 1x/day to aid in rehabilitation program.                   Status at IE: Patient instructed in and provided written copy of initial Home Exercise Program.                   Current: Patient progressing in level of independence with Comprehensive Aquatic Exercise Program for Management of Low Back Pain. 8/2/2022, in progress                      Patient will increase lumbar ROM flexion to fingertips to floor to aid in completion of ADLs. Status at IE: lumbar flexion fingertips 8 inches from floor. Current: lumbar flexion fingertips 5 inches from floor. 8/18/2022, in progress     Long Term Goals: To be accomplished in 8 weeks:                   Patient will increase Bayron LE strength to 5/5 MMT throughout to aid in completion of ADLs. Status at IE: bilateral LE strength 3+/5                   Current: bilateral LE strength improved to 4/5.        8/2/2022, in progress                      Patient will report pain no greater than 1-3/10 throughout entire day to aid in completion of ADLs.                    Status at IE: 3-7/10 Current: Pain level improved to 0-3/10.    8/11/2022, in progress                      Patent will improve stand/walk tolerance to 30 minutes to be able to  complete household ADLs. Status at IE: <10 minutes. Current: Stand/walk tolerance improved to 10 to 15 minutes. 8/2/2022, in progress                      Patient will improve FOTO (an established functional score where 100 = no disability) score to 57 points to demonstrate improvement in functional status.                    Status at IE:52                   Current: 53           8/9/2022, in progress       PLAN  []  Upgrade activities as tolerated     [x]  Continue plan of care  []  Update interventions per flow sheet       []  Discharge due to:_  []  Other:_      Ulysses Sang, PT 8/18/2022  8:28 AM    Future Appointments   Date Time Provider Herbie Johnston   8/19/2022  8:00 AM Boubacar Ghotra [place] [facility]   8/23/2022  8:00 AM Vy Hernandez [facility]   8/30/2022  8:00 AM JANNY Hernandez [facility]   9/1/2022  8:00 AM JANNY Hernandez THE [facility]   9/6/2022  2:45 PM Boubacar Ghotra [place] [facility]   9/8/2022  2:45 PM Ghazal Swanson THE [facility]

## 2022-08-19 ENCOUNTER — HOSPITAL ENCOUNTER (OUTPATIENT)
Dept: PHYSICAL THERAPY | Age: 67
Discharge: HOME | End: 2022-08-19
Payer: MEDICARE

## 2022-08-19 PROCEDURE — 97110 THERAPEUTIC EXERCISES: CPT

## 2022-08-19 PROCEDURE — 97530 THERAPEUTIC ACTIVITIES: CPT

## 2022-08-19 PROCEDURE — 97112 NEUROMUSCULAR REEDUCATION: CPT

## 2022-08-19 NOTE — PROGRESS NOTES
PT DAILY TREATMENT NOTE    Patient Name: Margo Kelly  Date:2022  : 1955  [x]  Patient  Verified  Payor: Dolly Raya / Plan: [place] MEDICARE PART A & B / Product Type: Medicare /    In time: 800  Out time: 846  Total Treatment Time (min): 46  Total Timed Codes (min): 46  1:1 Treatment Time ( [address] only): 44   Visit #: 11 of 16    Treatment Dx: Other low back pain [M54.59]    SUBJECTIVE  Pain Level (0-10 scale): 0  Any medication changes, allergies to medications, adverse drug reactions, diagnosis change, or new procedure performed?: [x] No    [] Yes (see summary sheet for update)  Subjective functional status/changes:   [] No changes reported  \"I woke up this morning with no pain at all. I think it is because we had an aquatic therapy session yesterday and I am still feeling the benefits of that. \"    OBJECTIVE    15 min Therapeutic Exercise:  [x] See flow sheet :   Rationale: increase ROM, increase strength and decrease pain to improve the patients ability to complete ADLs  ambulation safety and efficiency in order to improve patient's ability to safely ambulate at home for self care. 15 min Therapeutic Activity:  [x]  See flow sheet :   Rationale: increase ROM, increase strength and improve coordination  to improve the patients ability to Complete ADLS     14 min Neuromuscular Re-education:  [x]  See flow sheet :   Rationale: improve coordination, improve balance and increase proprioception  to improve the patients ability to complete ADLS, and decrease falls risk    With   [] TE   [] TA   [] neuro   [] other: Patient Education: [x] Review HEP    [] Progressed/Changed HEP based on:   [] positioning   [] body mechanics   [] transfers   [] heat/ice application    [] other:      Other Objective/Functional Measures: NA     Pain Level (0-10 scale) post treatment: 0    ASSESSMENT/Changes in Function: Educating patient regarding transition plan.  Since patient is demonstrating significant benefit with aquatic exercise, encouraging patient to consider recommendation of obtaining access to a pool to continue aquatic program independently on a long term basis. Provided patient options for long term pool access. Patient will begin investigating options. Patient responds well to treatment session. No adverse effects were noted from today's treatment session. Patient will continue to benefit from skilled PT services to modify and progress therapeutic interventions, address functional mobility deficits, address ROM deficits, address strength deficits, analyze and address soft tissue restrictions, analyze and cue movement patterns, analyze and modify body mechanics/ergonomics, assess and modify postural abnormalities,  and instruct in home and community integration to attain remaining goals. [x]  See Plan of Care  []  See progress note/recertification  []  See Discharge Summary         Progress towards goals / Updated goals:  Short Term Goals: To be accomplished in 4 weeks:                   Patient will report compliance with HEP 1x/day to aid in rehabilitation program.                   Status at IE: Patient instructed in and provided written copy of initial Home Exercise Program.                   Current: Patient progressing in level of independence with Comprehensive Aquatic Exercise Program for Management of Low Back Pain. 8/2/2022, in progress                      Patient will increase lumbar ROM flexion to fingertips to floor to aid in completion of ADLs. Status at IE: lumbar flexion fingertips 8 inches from floor. Current: lumbar flexion fingertips 5 inches from floor. 8/18/2022, in progress     Long Term Goals: To be accomplished in 8 weeks:                   Patient will increase Bayron LE strength to 5/5 MMT throughout to aid in completion of ADLs.                    Status at IE: bilateral LE strength 3+/5                   Current: bilateral LE strength improved to 4/5.        8/2/2022, in progress                      Patient will report pain no greater than 1-3/10 throughout entire day to aid in completion of ADLs. Status at IE: 3-7/10                   Current: Pain level improved to 0-3/10.    8/11/2022, in progress                      Patent will improve stand/walk tolerance to 30 minutes to be able to  complete household ADLs. Status at IE: <10 minutes. Current: Stand/walk tolerance improved to 10 to 15 minutes. 8/2/2022, in progress                      Patient will improve FOTO (an established functional score where 100 = no disability) score to 57 points to demonstrate improvement in functional status.                    Status at IE:52                   Current: 53           8/9/2022, in progress       PLAN  []  Upgrade activities as tolerated     [x]  Continue plan of care  []  Update interventions per flow sheet       []  Discharge due to:_  []  Other:_      Ariadna Genao, PT 8/19/2022  8:12 AM    Future Appointments   Date Time Provider Herbie Johnston   8/23/2022  8:00 AM [facility]riz [facility]   8/26/2022  8:00 AM Cinda Heart, PTA [facility]   8/30/2022  8:00 AM Carlos Alberto Uribe, [place]ANNY [place] [facility]   9/1/2022  8:00 AM Carlos Alberto Uribe PT [facility]   9/6/2022  2:45 PM [place] [facility]   9/8/2022  2:45 PM Isaias Collins [facility]

## 2022-08-23 ENCOUNTER — HOSPITAL ENCOUNTER (OUTPATIENT)
Dept: PHYSICAL THERAPY | Age: 67
Discharge: HOME | End: 2022-08-23
Payer: MEDICARE

## 2022-08-23 PROCEDURE — 97112 NEUROMUSCULAR REEDUCATION: CPT

## 2022-08-23 PROCEDURE — 97530 THERAPEUTIC ACTIVITIES: CPT

## 2022-08-23 PROCEDURE — 97110 THERAPEUTIC EXERCISES: CPT

## 2022-08-23 NOTE — PROGRESS NOTES
PT DAILY TREATMENT NOTE    Patient Name: Carmine Castro  Date:2022  : 1955  [x]  Patient  Verified  Payor: Chon Keep / Plan: [place] MEDICARE PART A & B / Product Type: Medicare /    In time: 659  Out time: 904  Total Treatment Time (min): 63  Total Timed Codes (min): 58  1:1 Treatment Time ( [address] only): 40   Visit #: 12 of 16    Treatment Dx: Other low back pain [M54.59]    SUBJECTIVE  Pain Level (0-10 scale): 0  Any medication changes, allergies to medications, adverse drug reactions, diagnosis change, or new procedure performed?: [x] No    [] Yes (see summary sheet for update)  Subjective functional status/changes:   [] No changes reported  \"Still some pain first thing in the morning, but the water therapy is really helping to reduce how long that pain lasts. \"    OBJECTIVE    15 min Therapeutic Exercise:  [x] See flow sheet :   Rationale: increase ROM, increase strength and decrease pain to improve the patients ability to complete ADLs  ambulation safety and efficiency in order to improve patient's ability to safely ambulate at home for self care.         15 min Therapeutic Activity:  [x]  See flow sheet :   Rationale: increase ROM, increase strength and improve coordination  to improve the patients ability to Complete ADLS     10 min Neuromuscular Re-education:  [x]  See flow sheet :   Rationale: improve coordination, improve balance and increase proprioception  to improve the patients ability to complete ADLS, and decrease falls risk    With   [] TE   [] TA   [] neuro   [] other: Patient Education: [x] Review HEP    [] Progressed/Changed HEP based on:   [] positioning   [] body mechanics   [] transfers   [] heat/ice application    [] other:      Other Objective/Functional Measures: NA     Pain Level (0-10 scale) post treatment: 0    ASSESSMENT/Changes in Function: Patient steadily progressing in level of independence with Aquatic Exercise Program requiring fewer verbal and visual cues for sequencing and minimal cues for proper technique. Patient responds well to treatment session. No adverse effects were noted from today's treatment session. Patient will continue to benefit from skilled PT services to modify and progress therapeutic interventions, address functional mobility deficits, address ROM deficits, address strength deficits, analyze and address soft tissue restrictions, analyze and cue movement patterns, analyze and modify body mechanics/ergonomics, assess and modify postural abnormalities,  and instruct in home and community integration to attain remaining goals. [x]  See Plan of Care  []  See progress note/recertification  []  See Discharge Summary         Progress towards goals / Updated goals:  Short Term Goals: To be accomplished in 4 weeks:                   Patient will report compliance with HEP 1x/day to aid in rehabilitation program.                   Status at IE: Patient instructed in and provided written copy of initial Home Exercise Program.                   Current: Patient progressing in level of independence with Comprehensive Aquatic Exercise Program for Management of Low Back Pain. 8/2/2022, in progress                      Patient will increase lumbar ROM flexion to fingertips to floor to aid in completion of ADLs. Status at IE: lumbar flexion fingertips 8 inches from floor. Current: lumbar flexion fingertips 5 inches from floor. 8/18/2022, in progress     Long Term Goals: To be accomplished in 8 weeks:                   Patient will increase Bayron LE strength to 5/5 MMT throughout to aid in completion of ADLs. Status at IE: bilateral LE strength 3+/5                   Current: bilateral LE strength improved to 4/5.        8/2/2022, in progress                      Patient will report pain no greater than 1-3/10 throughout entire day to aid in completion of ADLs.                    Status at IE: 3-7/10 Current: Pain level improved to 0-3/10.    8/11/2022, in progress                      Patent will improve stand/walk tolerance to 30 minutes to be able to  complete household ADLs. Status at IE: <10 minutes. Current: Stand/walk tolerance improved to approximately 15 minutes. 8/23/2022, in progress                      Patient will improve FOTO (an established functional score where 100 = no disability) score to 57 points to demonstrate improvement in functional status.                    Status at IE:52                   Current: 53           8/9/2022, in progress       PLAN  []  Upgrade activities as tolerated     [x]  Continue plan of care  []  Update interventions per flow sheet       []  Discharge due to:_  []  Other:_      Mima Altamirano, JANNY 8/23/2022  8:12 AM    Future Appointments   Date Time Provider Herbie Johnston   8/26/2022  8:00 AM Roosevelt Root, TEQUILA [facility]   8/30/2022  8:00 AM Tori Hinojosa, JANNY [place] [facility]   9/1/2022  8:00 AM Adriana Ghotra [facility]   9/7/2022  2:45 PM Ronald Iverson [facility]

## 2022-08-26 ENCOUNTER — HOSPITAL ENCOUNTER (OUTPATIENT)
Dept: PHYSICAL THERAPY | Age: 67
Discharge: HOME | End: 2022-08-26
Payer: MEDICARE

## 2022-08-26 PROCEDURE — 97112 NEUROMUSCULAR REEDUCATION: CPT

## 2022-08-26 PROCEDURE — 97110 THERAPEUTIC EXERCISES: CPT

## 2022-08-26 PROCEDURE — 97530 THERAPEUTIC ACTIVITIES: CPT

## 2022-08-26 NOTE — PROGRESS NOTES
PT DAILY TREATMENT NOTE    Patient Name: Caroline Francois  Date:2022  : 1955  [x]  Patient  Verified  Payor: Luis Manuel Racer / Plan: [place] MEDICARE PART A & B / Product Type: Medicare /    In time:758  Out time:900  Total Treatment Time (min): 62  Total Timed Codes (min): 62  1:1 Treatment Time ([facility] only): 62   Visit #: 13 of 16    Treatment Dx: Other low back pain [M54.59]    SUBJECTIVE  Pain Level (0-10 scale): 0  Any medication changes, allergies to medications, adverse drug reactions, diagnosis change, or new procedure performed?: [x] No    [] Yes (see summary sheet for update)  Subjective functional status/changes:   [] No changes reported  Patient reports of no changes since last visit. OBJECTIVE    30 min Therapeutic Exercise:  [x] See flow sheet :   Rationale: increase ROM and increase strength to improve the patients ability to perform ADL's ambulation safety and efficiency in order to improve patient's ability to safely ambulate at home for self care. 17 min Therapeutic Activity:  [x]  See flow sheet :   Rationale: increase ROM, increase strength, and decrease pain  to improve the patients ability to Complete ADLS     15 min Neuromuscular Re-education:  [x]  See flow sheet :   Rationale: increase ROM, increase strength, improve balance, and increase proprioception  to improve the patients ability to improve the patients ability to complete ADLS, and decrease falls risk            With   [] TE   [] TA   [] neuro   [] other: Patient Education: [x] Review HEP    [] Progressed/Changed HEP based on:   [] positioning   [] body mechanics   [] transfers   [] heat/ice application    [] other:      Other Objective/Functional Measures: NA     Pain Level (0-10 scale) post treatment: 0    ASSESSMENT/Changes in Function:   Patient continues to progress well with aquatic exercise program. She requires less cues with demonstration. Patient responds well to treatment session.   No adverse effects were noted from today's treatment session. Patient will continue to benefit from skilled PT services to modify and progress therapeutic interventions, address functional mobility deficits, address ROM deficits, address strength deficits, analyze and address soft tissue restrictions, analyze and cue movement patterns, analyze and modify body mechanics/ergonomics, assess and modify postural abnormalities, and instruct in home and community integration to attain remaining goals. [x]  See Plan of Care  []  See progress note/recertification  []  See Discharge Summary         Progress towards goals / Updated goals:  Short Term Goals: To be accomplished in 4 weeks:                   Patient will report compliance with HEP 1x/day to aid in rehabilitation program.                   Status at IE: Patient instructed in and provided written copy of initial Home Exercise Program.                   Current: Patient progressing in level of independence with Comprehensive Aquatic Exercise Program for Management of Low Back Pain. 8/2/2022, in progress                      Patient will increase lumbar ROM flexion to fingertips to floor to aid in completion of ADLs. Status at IE: lumbar flexion fingertips 8 inches from floor. Current: lumbar flexion fingertips 5 inches from floor. 8/18/2022, in progress     Long Term Goals: To be accomplished in 8 weeks:                   Patient will increase Bayron LE strength to 5/5 MMT throughout to aid in completion of ADLs. Status at IE: bilateral LE strength 3+/5                   Current: bilateral LE strength improved to 4/5.        8/2/2022, in progress                      Patient will report pain no greater than 1-3/10 throughout entire day to aid in completion of ADLs.                    Status at IE: 3-7/10                   Current: Pain level improved to 0-3/10.    8/11/2022, in progress                      Patent will improve stand/walk tolerance to 30 minutes to be able to  complete household ADLs. Status at IE: <10 minutes. Current: Stand/walk tolerance improved to approximately 15 minutes. 8/23/2022, in progress                      Patient will improve FOTO (an established functional score where 100 = no disability) score to 57 points to demonstrate improvement in functional status.                    Status at IE:52                   Current: 53           8/9/2022, in progress       PLAN  []  Upgrade activities as tolerated     [x]  Continue plan of care  []  Update interventions per flow sheet       []  Discharge due to:_  []  Other:_      Lisa Benz PTA 8/26/2022  7:33 AM    Future Appointments   Date Time Provider Herbie Johnston   8/26/2022  8:00 AM Leilani Padilla PTA [facility]   8/30/2022  8:00 AM Andrea Showers, PT [place] [facility]   9/1/2022  8:00 AM Jenny Ghotra [facility]   9/7/2022  2:45 PM Cindy Courtney [facility]

## 2022-08-30 ENCOUNTER — HOSPITAL ENCOUNTER (OUTPATIENT)
Dept: PHYSICAL THERAPY | Age: 67
Discharge: HOME | End: 2022-08-30
Payer: MEDICARE

## 2022-08-30 ENCOUNTER — TELEPHONE (OUTPATIENT)
Dept: PHYSICAL THERAPY | Age: 67
End: 2022-08-30

## 2022-08-30 PROCEDURE — 97112 NEUROMUSCULAR REEDUCATION: CPT

## 2022-08-30 PROCEDURE — 97110 THERAPEUTIC EXERCISES: CPT

## 2022-08-30 PROCEDURE — 97530 THERAPEUTIC ACTIVITIES: CPT

## 2022-08-30 NOTE — PROGRESS NOTES
[facility] at [address] [place] [address] Drive: [phone]   Fax: [phone]  Progress Note  Patient Name: Jaycee Oro : 1955   Medical  Diagnosis: Other low back pain [M54.59] Treatment Diagnosis: Low back pain   Onset Date: 3/29/2022       Referral Source: [facility]): 2022   Prior Hospitalization: See medical history Provider #: [id number]   Prior Level of Function: treadmill 3 miles 3-4 x/wk   Comorbidities: diabetes, OA, HTN, osteoporosis, colon cancer, lumbar fusion L3-4   Medications: Verified on Patient Summary List      ===========================================================================================  Assessment / Summary of Care:  Jaycee Oro is a 69 y.o.  female who demonstrating improved consistency in proper sequencing and technique throughout aquatic exercise program. Preparing patient for transition to independent self care. Patient is now agreeable to obtaining membership at local pool and gym to continue independently on a long term basis. Will conclude PT with one final aquatic training session to assure full independence and then one session in gym to instruct patient in appropriate land based exercises in gym setting. Patient will continue to benefit from skilled PT services to modify and progress therapeutic interventions, address functional mobility deficits, address ROM deficits, address strength deficits, analyze and address soft tissue restrictions, analyze and cue movement patterns, analyze and modify body mechanics/ergonomics, assess and modify postural abnormalities,  and instruct in home and community integration to attain remaining goals.    ===========================================================================================    Plan:Continue therapy per initial plan/protocol at a frequency of  1 x per week for 2 weeks    Progress Towards Goals:   Short Term Goals:  To be accomplished in 4 weeks:                   Patient will report compliance with HEP 1x/day to aid in rehabilitation program.                   Status at IE: Patient instructed in and provided written copy of initial Home Exercise Program.                   Current: Patient progressing in level of independence with Comprehensive Aquatic Exercise Program for Management of Low Back Pain. 8/2/2022, in progress                      Patient will increase lumbar ROM flexion to fingertips to floor to aid in completion of ADLs. Status at IE: lumbar flexion fingertips 8 inches from floor. Current: lumbar flexion fingertips 4 inches from floor. 8/30/2022, in progress     Long Term Goals: To be accomplished in 8 weeks:                   Patient will increase Bayron LE strength to 5/5 MMT throughout to aid in completion of ADLs. Status at IE: bilateral LE strength 3+/5                   Current: bilateral LE strength improved to 4/5.        8/2/2022, in progress                      Patient will report pain no greater than 1-3/10 throughout entire day to aid in completion of ADLs. Status at IE: 3-7/10                   Current: Pain level improved to 0-3/10.    8/11/2022, in progress                      Patent will improve stand/walk tolerance to 30 minutes to be able to  complete household ADLs. Status at IE: <10 minutes. Current: Stand/walk tolerance improved to approximately 15 minutes. 8/23/2022, in progress                      Patient will improve FOTO (an established functional score where 100 = no disability) score to 57 points to demonstrate improvement in functional status. Status at IE:52                   Current: 53           8/9/2022, in progress    ===========================================================================================  Subjective:  \"The water therapy is working very well. The pain is staying low more consistently now. \"        Therapist Signature: Aurora Norman PT, DPT Date: 2/92/9041   Re-Certification: NA Time: 8:22 AM         [facility] at [address]                    Phone: [phone]   Fax: [phone]  .

## 2022-08-30 NOTE — PROGRESS NOTES
PT DAILY TREATMENT NOTE    Patient Name: Marzena Beard  Date:2022  : 1955  [x]  Patient  Verified  Payor: Leidy Medici / Plan: [place] & [place] / Product Type: Medicare /    In time: 562  Out time: 859  Total Treatment Time (min): 62  Total Timed Codes (min): 57  1:1 Treatment Time ([address] only): 40   Visit #: 14 of 16    Treatment Dx: Other low back pain [M54.59]    SUBJECTIVE  Pain Level (0-10 scale): 0  Any medication changes, allergies to medications, adverse drug reactions, diagnosis change, or new procedure performed?: [x] No    [] Yes (see summary sheet for update)  Subjective functional status/changes:   [] No changes reported  \"The water therapy is working very well. The pain is staying low more consistently now. \"    OBJECTIVE    15 min Therapeutic Exercise:  [x] See flow sheet :   Rationale: increase ROM, increase strength and decrease pain to improve the patients ability to complete ADLs  ambulation safety and efficiency in order to improve patient's ability to safely ambulate at home for self care.         15 min Therapeutic Activity:  [x]  See flow sheet :   Rationale: increase ROM, increase strength and improve coordination  to improve the patients ability to Complete ADLS     10 min Neuromuscular Re-education:  [x]  See flow sheet :   Rationale: improve coordination, improve balance and increase proprioception  to improve the patients ability to complete ADLS, and decrease falls risk    With   [] TE   [] TA   [] neuro   [] other: Patient Education: [x] Review HEP    [] Progressed/Changed HEP based on:   [] positioning   [] body mechanics   [] transfers   [] heat/ice application    [] other:      Other Objective/Functional Measures: NA     Pain Level (0-10 scale) post treatment: 0    ASSESSMENT/Changes in Function: Patient demonstrating improved consistency in proper sequencing and technique throughout aquatic exercise program. Preparing patient for transition to independent self care. Patient is now agreeable to obtaining membership at local pool and gym to continue independently on a long term basis. Will conclude PT with one final aquatic training session to assure full independence and then one session in gym to instruct patient in appropriate land based exercises in gym setting. Patient responds well to treatment session. No adverse effects were noted from today's treatment session. Patient will continue to benefit from skilled PT services to modify and progress therapeutic interventions, address functional mobility deficits, address ROM deficits, address strength deficits, analyze and address soft tissue restrictions, analyze and cue movement patterns, analyze and modify body mechanics/ergonomics, assess and modify postural abnormalities,  and instruct in home and community integration to attain remaining goals. [x]  See Plan of Care  []  See progress note/recertification  []  See Discharge Summary         Progress towards goals / Updated goals:  Short Term Goals: To be accomplished in 4 weeks:                   Patient will report compliance with HEP 1x/day to aid in rehabilitation program.                   Status at IE: Patient instructed in and provided written copy of initial Home Exercise Program.                   Current: Patient progressing in level of independence with Comprehensive Aquatic Exercise Program for Management of Low Back Pain. 8/2/2022, in progress                      Patient will increase lumbar ROM flexion to fingertips to floor to aid in completion of ADLs. Status at IE: lumbar flexion fingertips 8 inches from floor. Current: lumbar flexion fingertips 4 inches from floor. 8/30/2022, in progress     Long Term Goals: To be accomplished in 8 weeks:                   Patient will increase Bayron LE strength to 5/5 MMT throughout to aid in completion of ADLs.                    Status at IE: bilateral LE strength 3+/5 Current: bilateral LE strength improved to 4/5.        8/2/2022, in progress                      Patient will report pain no greater than 1-3/10 throughout entire day to aid in completion of ADLs. Status at IE: 3-7/10                   Current: Pain level improved to 0-3/10.    8/11/2022, in progress                      Patent will improve stand/walk tolerance to 30 minutes to be able to  complete household ADLs. Status at IE: <10 minutes. Current: Stand/walk tolerance improved to approximately 15 minutes. 8/23/2022, in progress                      Patient will improve FOTO (an established functional score where 100 = no disability) score to 57 points to demonstrate improvement in functional status.                    Status at IE:52                   Current: 53           8/9/2022, in progress       PLAN  []  Upgrade activities as tolerated     [x]  Continue plan of care  []  Update interventions per flow sheet       []  Discharge due to:_  []  Other:_      Yari Aleman, PT 8/30/2022  8:12 AM    Future Appointments   Date Time Provider Herbie Johnston   9/1/2022  8:00 AM Liyah Ghotra [facility]   9/7/2022  2:45 PM Liyah Ghotra [facility]

## 2022-09-01 ENCOUNTER — HOSPITAL ENCOUNTER (OUTPATIENT)
Dept: PHYSICAL THERAPY | Age: 67
Discharge: HOME | End: 2022-09-01
Payer: MEDICARE

## 2022-09-01 PROCEDURE — 97112 NEUROMUSCULAR REEDUCATION: CPT

## 2022-09-01 PROCEDURE — 97110 THERAPEUTIC EXERCISES: CPT

## 2022-09-01 PROCEDURE — 97530 THERAPEUTIC ACTIVITIES: CPT

## 2022-09-01 NOTE — PROGRESS NOTES
PT DAILY TREATMENT NOTE    Patient Name: Carmine Castro  Date:2022  : 1955  [x]  Patient  Verified  Payor: Chon Keep / Plan: [place] MEDICARE PART A & B / Product Type: Medicare /    In time: 857  Out time: 859  Total Treatment Time (min): 54  Total Timed Codes (min): 54  1:1 Treatment Time ( [address] only): 40   Visit #: 15 of 16    Treatment Dx: Other low back pain [M54.59]    SUBJECTIVE  Pain Level (0-10 scale): 2  Any medication changes, allergies to medications, adverse drug reactions, diagnosis change, or new procedure performed?: [x] No    [] Yes (see summary sheet for update)  Subjective functional status/changes:   [] No changes reported  \"I was up on a stepladder cleaning ceilings yesterday and it really aggravated my back. I have a sharp pain on the right side of my back. \"    OBJECTIVE    15 min Therapeutic Exercise:  [x] See flow sheet :   Rationale: increase ROM, increase strength and decrease pain to improve the patients ability to complete ADLs  ambulation safety and efficiency in order to improve patient's ability to safely ambulate at home for self care.         15 min Therapeutic Activity:  [x]  See flow sheet :   Rationale: increase ROM, increase strength and improve coordination  to improve the patients ability to Complete ADLS     10 min Neuromuscular Re-education:  [x]  See flow sheet :   Rationale: improve coordination, improve balance and increase proprioception  to improve the patients ability to complete ADLS, and decrease falls risk    With   [] TE   [] TA   [] neuro   [] other: Patient Education: [x] Review HEP    [] Progressed/Changed HEP based on:   [] positioning   [] body mechanics   [] transfers   [] heat/ice application    [] other:      Other Objective/Functional Measures: NA     Pain Level (0-10 scale) post treatment: 0    ASSESSMENT/Changes in Function: Patient instructed in quadratus lumborum stretch (sidebending) to assist in decreasing/resolving right lumbar sharp pain with good effect noted. Patient responds well to treatment session. No adverse effects were noted from today's treatment session. Patient will continue to benefit from skilled PT services to modify and progress therapeutic interventions, address functional mobility deficits, address ROM deficits, address strength deficits, analyze and address soft tissue restrictions, analyze and cue movement patterns, analyze and modify body mechanics/ergonomics, assess and modify postural abnormalities,  and instruct in home and community integration to attain remaining goals. [x]  See Plan of Care  []  See progress note/recertification  []  See Discharge Summary         Progress towards goals / Updated goals:  Short Term Goals: To be accomplished in 4 weeks:                   Patient will report compliance with HEP 1x/day to aid in rehabilitation program.                   Status at IE: Patient instructed in and provided written copy of initial Home Exercise Program.                   Current: Patient nearing full independence with Comprehensive Aquatic Exercise Program for Management of Low Back Pain. 9/1/2022, in progress                      Patient will increase lumbar ROM flexion to fingertips to floor to aid in completion of ADLs. Status at IE: lumbar flexion fingertips 8 inches from floor. Current: lumbar flexion fingertips 4 inches from floor. 8/30/2022, in progress     Long Term Goals: To be accomplished in 8 weeks:                   Patient will increase Bayron LE strength to 5/5 MMT throughout to aid in completion of ADLs. Status at IE: bilateral LE strength 3+/5                   Current: bilateral LE strength improved to 4/5.        8/2/2022, in progress                      Patient will report pain no greater than 1-3/10 throughout entire day to aid in completion of ADLs.                    Status at IE: 3-7/10                   Current: Pain level improved to 0-3/10.    8/11/2022, in progress                      Patent will improve stand/walk tolerance to 30 minutes to be able to  complete household ADLs. Status at IE: <10 minutes. Current: Stand/walk tolerance improved to approximately 15 minutes. 8/23/2022, in progress                      Patient will improve FOTO (an established functional score where 100 = no disability) score to 57 points to demonstrate improvement in functional status.                    Status at IE:52                   Current: 53           8/9/2022, in progress    PLAN  []  Upgrade activities as tolerated     [x]  Continue plan of care  []  Update interventions per flow sheet       []  Discharge due to:_  []  Other:_      Ulysses Sang, PT 9/1/2022  8:27 AM    Future Appointments   Date Time Provider Herbie Johnston   9/27/2022  9:30 AM Boubacar Ghotra, PT [facility]

## 2022-09-06 ENCOUNTER — APPOINTMENT (OUTPATIENT)
Dept: PHYSICAL THERAPY | Age: 67
End: 2022-09-06
Payer: MEDICARE

## 2022-09-07 ENCOUNTER — APPOINTMENT (OUTPATIENT)
Dept: PHYSICAL THERAPY | Age: 67
End: 2022-09-07
Payer: MEDICARE

## 2022-09-08 ENCOUNTER — APPOINTMENT (OUTPATIENT)
Dept: PHYSICAL THERAPY | Age: 67
End: 2022-09-08
Payer: MEDICARE

## 2022-09-27 ENCOUNTER — TELEPHONE (OUTPATIENT)
Dept: PHYSICAL THERAPY | Age: 67
End: 2022-09-27

## 2022-09-27 ENCOUNTER — HOSPITAL ENCOUNTER (OUTPATIENT)
Dept: PHYSICAL THERAPY | Age: 67
Discharge: HOME | End: 2022-09-27
Payer: MEDICARE

## 2022-09-27 PROCEDURE — 97110 THERAPEUTIC EXERCISES: CPT

## 2022-09-27 PROCEDURE — 97530 THERAPEUTIC ACTIVITIES: CPT

## 2022-09-27 PROCEDURE — 97112 NEUROMUSCULAR REEDUCATION: CPT

## 2022-09-27 NOTE — PROGRESS NOTES
PT DAILY TREATMENT NOTE    Patient Name: Annabella Mcguire  Date:2022  : 1955  [x]  Patient  Verified  Payor: Luis Eduardo Dhillon / Plan: [place] MEDICARE PART A & B / Product Type: Medicare /    In time: 655  Out time: 1022  Total Treatment Time (min): 38  Total Timed Codes (min): 38  1:1 Treatment Time ( [address] only): 38   Visit #: 16 of 16    Treatment Dx: Other low back pain [M54.59]    SUBJECTIVE  Pain Level (0-10 scale): 0  Any medication changes, allergies to medications, adverse drug reactions, diagnosis change, or new procedure performed?: [x] No    [] Yes (see summary sheet for update)  Subjective functional status/changes:   [] No changes reported  \"I have joined the local [employer] so I can continue the pool exercises on my own. But, I also need to learn what exercises I can safely do in the gym. \"    OBJECTIVE    15 min Therapeutic Exercise:  [x] See flow sheet :   Rationale: increase ROM, increase strength and decrease pain to improve the patients ability to complete ADLs  ambulation safety and efficiency in order to improve patient's ability to safely ambulate at home for self care.         13 min Therapeutic Activity:  [x]  See flow sheet :   Rationale: increase ROM, increase strength and improve coordination  to improve the patients ability to Complete ADLS     10 min Neuromuscular Re-education:  [x]  See flow sheet :   Rationale: improve coordination, improve balance and increase proprioception  to improve the patients ability to complete ADLS, and decrease falls risk    With   [] TE   [] TA   [] neuro   [] other: Patient Education: [x] Review HEP    [] Progressed/Changed HEP based on:   [] positioning   [] body mechanics   [] transfers   [] heat/ice application    [] other:      Other Objective/Functional Measures: NA     Pain Level (0-10 scale) post treatment: 0    ASSESSMENT/Changes in Function: Patient was initially referred to PT for Aquatic Exercise Program instruction for management of low back pain. Patient noted marked improvement in pain reduction and exercise tolerance with aquatic exercises. Patient was recommended to join local gym so she could continue aquatic exercise after discharge from PT. Patient has joined local [facility]. She has been provided written copy of comprehensive aquatic exercise program and has also been instructed in appropriate land based program she can safely perform in gym setting. Patient responds well to treatment session. No adverse effects were noted from today's treatment session. []  See Plan of Care  []  See progress note/recertification  [x]  See Discharge Summary         Progress towards goals / Updated goals:  Short Term Goals: To be accomplished in 4 weeks:                   Patient will report compliance with HEP 1x/day to aid in rehabilitation program.                   Status at IE: Patient instructed in and provided written copy of initial Home Exercise Program.                   Current: Patient now fully independent with Comprehensive Aquatic Exercise Program for Management of Low Back Pain. and has been instructed in appropriate gym exercises as well. 9/27/2022, MET                      Patient will increase lumbar ROM flexion to fingertips to floor to aid in completion of ADLs. Status at IE: lumbar flexion fingertips 8 inches from floor. Current: lumbar flexion fingertips 3 inches from floor. 9/27/2022, in progress     Long Term Goals: To be accomplished in 8 weeks:                   Patient will increase Bayron LE strength to 5/5 MMT throughout to aid in completion of ADLs. Status at IE: bilateral LE strength 3+/5                   Current: bilateral LE strength improved to 4+/5.        9/272022, in progress                      Patient will report pain no greater than 1-3/10 throughout entire day to aid in completion of ADLs.                    Status at IE: 3-7/10                   Current: Pain level improved to 0-2/10.    9/27/2022, MET                      Patent will improve stand/walk tolerance to 30 minutes to be able to  complete household ADLs. Status at IE: <10 minutes. Current: Stand/walk tolerance improved to approximately 18 minutes. 9/272022, in progress                      Patient will improve FOTO (an established functional score where 100 = no disability) score to 57 points to demonstrate improvement in functional status. Status at IE:52                   Current: 57           9/27/2022, MET       PLAN  []  Upgrade activities as tolerated     [x]  Continue plan of care  []  Update interventions per flow sheet       [x]  Discharge due to:_ ready to transition to independent self care  []  Other:_      Renzo Aw, PT 9/27/2022  9:50 AM    No future appointments.

## 2022-09-27 NOTE — PROGRESS NOTES
Physical Therapy Discharge Instructions    [facility] at [address] [place] [address]  Phone: [phone]   Fax: [phone]      Patient: Naina Lund  : 1955    Continue Home Exercise Program 1 times per day for 4 weeks, then decrease to 3-4 times per week      Continue with    [x] Ice  as needed      [] Heat           Follow up with MD:     [] Upon completion of therapy     [x] As needed      Recommendations:     []   Return to activity with home program    [x]   Return to activity with the following modifications:       []Post Rehab Program    [x]Join Independent aquatic program     [x]Return to/join local gym      Additional Comments: Please contact clinic at above phone number if you have any questions regarding Home Exercise Program or self care instructions.       Derek Flynn, PT 2022 11:42 AM

## 2022-09-27 NOTE — PROGRESS NOTES
[facility] at [address] [place] [address] Drive: [phone]   Fax: [phone]  Discharge Summary  Patient Name: Emi Tipton : 1955   Medical  Diagnosis: Other low back pain [M54.59] Treatment Diagnosis: Low back pain   Onset Date: 3/29/2022       [facility]   Prior Hospitalization: See medical history Provider #: [id number]   Prior Level of Function: treadmill 3 miles 3-4 x/wk   Comorbidities: diabetes, OA, HTN, osteoporosis, colon cancer, lumbar fusion L3-4   Medications: Verified on Patient Summary List      ===========================================================================================  Assessment / Summary of Care:  Emi Tipton is a 69 y.o.   female who was initially referred to PT for Aquatic Exercise Program instruction for management of low back pain. Patient noted marked improvement in pain reduction and exercise tolerance with aquatic exercises. Patient was recommended to join local gym so she could continue aquatic exercise after discharge from PT. Patient has joined local [facility]. She has been provided written copy of comprehensive aquatic exercise program and has also been instructed in appropriate land based program she can safely perform in gym setting.     ===========================================================================================    Plan: Discharge to independent HEP. Progress Towards Goals:     Short Term Goals: To be accomplished in 4 weeks:                   Patient will report compliance with HEP 1x/day to aid in rehabilitation program.                   Status at IE: Patient instructed in and provided written copy of initial Home Exercise Program.                   Current: Patient now fully independent with Comprehensive Aquatic Exercise Program for Management of Low Back Pain. and has been instructed in appropriate gym exercises as well.  2022, MET                      Patient will increase lumbar ROM flexion to fingertips to floor to aid in completion of ADLs. Status at IE: lumbar flexion fingertips 8 inches from floor. Current: lumbar flexion fingertips 3 inches from floor. 9/27/2022, in progress     Long Term Goals: To be accomplished in 8 weeks:                   Patient will increase Bayron LE strength to 5/5 MMT throughout to aid in completion of ADLs. Status at IE: bilateral LE strength 3+/5                   Current: bilateral LE strength improved to 4+/5.        9/272022, in progress                      Patient will report pain no greater than 1-3/10 throughout entire day to aid in completion of ADLs. Status at IE: 3-7/10                   Current: Pain level improved to 0-2/10.    9/27/2022, MET                      Patent will improve stand/walk tolerance to 30 minutes to be able to  complete household ADLs. Status at IE: <10 minutes. Current: Stand/walk tolerance improved to approximately 18 minutes. 9/272022, in progress                      Patient will improve FOTO (an established functional score where 100 = no disability) score to 57 points to demonstrate improvement in functional status.                    Status at IE:52                   Current: 57           9/27/2022, MET    ===========================================================================================    Therapist Signature: Brayan Mckeon PT, DPT Date: 9/27/2022     Time: 11:39 AM

## 2023-03-15 ENCOUNTER — HOSPITAL ENCOUNTER (OUTPATIENT)
Facility: HOSPITAL | Age: 68
Setting detail: RECURRING SERIES
Discharge: HOME | End: 2023-03-18
Payer: MEDICARE

## 2023-03-15 PROCEDURE — 97161 PT EVAL LOW COMPLEX 20 MIN: CPT

## 2023-03-15 PROCEDURE — 97110 THERAPEUTIC EXERCISES: CPT

## 2023-03-15 NOTE — PROGRESS NOTES
[facility] at [address] [place] [address]  Phone: [phone]   Fax: [phone]    Plan of Care/ Statement of Necessity for Physical Therapy Services        Patient name: Tiny Lopez Start of Care: 3/15/2023   Referral source: Jem Samaniego Adityaikema : 1955    Medical Diagnosis: Other low back pain [M54.59]  Radiculopathy, lumbar region [M54.16]      Onset Date: 2023    Treatment Diagnosis:  M54.59  OTHER LOWER BACK PAIN                                          Prior Hospitalization: see medical history Provider#: [id number]   Medications: Verified on Patient Summary List     Comorbidities: diabetes, HTN, colon ca, obesity, lumbar discectomy, lumbar fusion x2  through   Prior Level of Function: prior to onset of lumbar radiculopathy ambulatory community distances without device, unrestricted stand/walk tolerance, independent in household ADLs. The Plan of Care and following information is based on the information from the initial evaluation. Assessment/ key information: Patient presents with signs/symptoms consistent with left L4 radiculopathy and leftt SI joint inflammation and left piriformis syndrome. Patient currently exhibits marked decrease in trunk AROM, decreased bilateral LE strength, difficulty with sit to stand transfers, limited stand/walk tolerance with intermittently antalgic gait pattern and severe left LE radicular pain. Patient will continue to benefit from skilled PT services to modify and progress therapeutic interventions, address functional mobility deficits, address ROM deficits, address strength deficits, analyze and address soft tissue restrictions, analyze and cue movement patterns, analyze and modify body mechanics/ergonomics and assess and modify postural abnormalities to attain remaining goals.        Evaluation Complexity HistoryMEDIUM  Complexity : 1-2 comorbidities / personal factors will impact the outcome/ POC  ; Examination LOW Complexity : 1-2 Standardized tests and measures addressing body structure, function, activity limitation and / or participation in recreation  ;Presentation LOW Complexity : Stable, uncomplicated  ;Clinical Decision Making MEDIUM Complexity : FOTO score of 26-74 FOTO score = an established functional score where 100 = no disability  Overall Complexity Rating: LOW   Problem List: pain affecting function, decrease ROM, decrease strength, impaired gait/balance, decrease ADL/functional abilities, decrease activity tolerance, decrease flexibility/joint mobility, and decrease transfer abilities    Treatment Plan may include any combination of the followin Therapeutic Exercise, 37959 Neuromuscular Re-Education, 81912 Therapeutic Activity, 86390 Self Care/Home Management, 19490 Aquatic Therapy, and 58421 Gait Training  Patient / Family readiness to learn indicated by: asking questions, trying to perform skills, interest, return verbalization , and return demonstration   Persons(s) to be included in education: patient (P)  Barriers to Learning/Limitations: None  Measures taken if barriers to learning present: NA  Patient Goal (s): Stop pain and strengthen the leg.   Patient Self Reported Health Status: good  Rehabilitation Potential: good    Short Term Goals: To be accomplished in 4 weeks:   Patient will demonstrate independence in [address] [place] [address] for Management of Low Back Pain. Status at IE: Patient instructed in initial land based HEP and will begin aquatic instruction at first treatment session. Current: Same as IE      Patient will increase lumbar ROM to flexion fingertips to floor and extension 30 degrees to aid in completion of ADLs. Status at IE: flexion fingertips 7 inches from floor, extnesion 22 degrees. Current: Same as IE    Long Term Goals:  To be accomplished in 8 weeks:   Patient will demonstrate independence in Comprehensive Aquatic Exercise Program for Management of Low Back Pain. Status at IE: Patient instructed in initial land based HEP and will begin aquatic instruction at first treatment session. Current: Same as IE    Patient will increase Beltran LE strength to 5/5 MMT throughout to aid in completion of ADLs. Status at IE: left LE 3+/5, right LE 4/5   Current: Same as IE     Patient will report pain no greater than 1-4/10 throughout entire day to aid in completion of ADLs. Status at IE: 7-9/10   Current: Same as IE     Patent will improve lift /carry tolerance to 15 pounds to be able to tolerate laundry and groceries. Status at IE: lift/carry tolerance <5 pounds. Current: Same as IE     Patient will improve FOTO (an established functional score where 100 = no disability) score to 58 points to demonstrate improvement in functional status. Status at IE:40   Current: Same as IE      Frequency / Duration: Patient to be seen 2 times per week for 8 weeks    Patient/ Caregiver education and instruction: Diagnosis, prognosis, self care, activity modification, and exercises     [x]  Plan of care has been reviewed with PTA    Certification Period: 3/15/2023 to 6,11/2023  Celina Irwin, PT 3/15/2023 12:42 PM  _____________________________________________________________________  I certify that the above Therapy Services are being furnished while the patient is under my care. I agree with the treatment plan and certify that this therapy is necessary.     Physician's Signature:_______________________ Date:_________ TIME:________                              [employer]  Insurance: Payor: [address] [place] [address],[address] / Plan: MEDICARE PART A AND B / Product Type: *No Product type* /      ** Signature, Date and Time must be completed for valid certification **    [facility] [place] [address] [place] [address]  Phone: [phone]   Fax: [phone]

## 2023-03-15 NOTE — PROGRESS NOTES
PT DAILY TREATMENT NOTE/LUMBAR EVAL 10-18    Patient Name: Vidya Ruiz  Date:3/15/2023  : 1955  [x]  Patient  Verified  Payor: MEDICARE / Plan: MEDICARE PART A AND B / Product Type: *No Product type* /    In time:1010  Out time:1102  Total Treatment Time (min): 30  Visit #: 1 of 16    Medicare/BCBS Only   Total Timed Codes (min):  30 1:1 Treatment Time:  30     Treatment Area: Other low back pain [M54.59]  Radiculopathy, lumbar region [M54.16]  SUBJECTIVE  Pain Level (0-10 scale): 7-9/10  [x]constant []intermittent []improving [x]worsening []no change since onset    Any medication changes, allergies to medications, adverse drug reactions, diagnosis change, or new procedure performed?: [x] No    [] Yes (see summary sheet for update)  Subjective functional status/changes:     Patient has history of three lumbar surgeries due to right lumbar radiculopathy. Patient was managing well at home recently until sudden onset of left LE radiculopathy began about two months ago with no apparent reason for onset. Patient describes pain as throbbing ache and burning from left lumbar and posterior hip radiating into anterior left thigh. Pain is worse in PM. Reports intermittent painful paresthesias in left L4 dermatome. Denies popping/clicking. Aggravating factors:pain worst with trunk rotation tasks and reaching. Alleviating factors: supine with left LE elevated. Denies red flags: SOB, chest pain, dizziness/lightheadedness, blurred/double vision, HA, chills/fevers, night sweats, change in bowel/bladder control, abdominal pain, difficulty swallowing, slurred speech, unexplained weight gain/loss, nausea, vomiting. PMHx: diabetes, HTN, colon ca, obesity. Surgical Hx: lumbar discectomy, lumbar fusion x2  through . Social Hx: , two level home, social alcohol, no tobacco, retired.  PLOF: prior to onset of lumbar radiculopathy ambulatory community distances without device, unrestricted stand/walk tolerance, independent in household ADLs. CLOF: moderate difficulty with sleep hygiene, moderate difficulty with laundry, vacuuming, shopping, moderate difficulty with carrying >5 pounds. Diagnostic Imaging: most recent MRI February 2023 no recent changes. OBJECTIVE/EXAMINATION    Precautions: none    22 min [x]Eval                  []Re-Eval       30 min Therapeutic Exercise:  [x] See flow sheet :   Rationale: increase ROM, increase strength and decrease pain to improve the patients ability to complete ADLs            With   [] TE   [] TA   [] neuro   [] other: Patient Education: [x] Review HEP    [] Progressed/Changed HEP based on:   [] positioning   [] body mechanics   [] transfers   [] heat/ice application    [] other:      Physical Therapy Evaluation - Lumbar Spine    OBJECTIVE  Posture:  Lateral Shift: [x] R    [] L     [x] +  [] -  Kyphosis: [] Increased [] Decreased   []  WNL  Lordosis:  [] Increased [x] Decreased   [] WNL  Pelvic symmetry: [x] WNL    [] Other:    Gait:  [] Normal     [x] Abnormal: Intermittently antalgic right LE, especially on initial ambulation after sitting. Ambulates stairs sideways. Active Movements: [] N/A   [] Too acute   [] Other:  ROM Degrees Comments:pain, area   Forward flexion    Fingertips 7 inches from floor   Extension  25  Increased pain   SB right  22  Increased pain   SB left  29    Rotation right  43  Increased pain   Rotation left  44  Increased pain     Repeated Movements: flexion decreased pain  Side Glide:  Sustained passive positioning test:    Neuro Screen [] WNL  Myotome/Dermatome/Reflexes: decreased left patellar tendon reflex, decreased sensation to light touch left L4 dermatome.   Comments:    Dural Mobility:  SLR Supine: [] R    [] L    [] +    [x] -  @ (degrees):   Slump Test: [] R    [] L    [] +    [x] -  @ (degrees):   Prone Knee Bend: [] R    [x] L    [x] +    [] -     Palpation  [] Min  [x] Mod  [] Severe    Location: left SI joint and piriformis  [] Min  [x] Mod  [] Severe    Location: left L4-5    Strength  Left LE 3+/5  Right LE 4/5    Special Tests  Lumbar:  Lumb. Compression: [x] Pos  [] Neg               Lumbar Distraction:   [] Pos  [] Neg    Quadrant:  [] Pos  [] Neg   [] Flex  [x] Ext    Sacroilliac:  Gaenslen's: [] R    [] L    [] +    [x] -     Compression: [] +    [x] -     Gapping:  [] +    [x] -            Hip: Milena Farrow:  [] R    [] L    [] +    [x] -     Scour:  [] R    [] L    [] +    [x] -     Piriformis: [] R    [x] L    [x] +    [] -     Other tests/comments:   30 Second Sit to Stand Test: 6.5 repetitions with light use of arm rests    Pain Level (0-10 scale) post treatment: 7    ASSESSMENT/Changes in Function: Patient presents with signs/symptoms consistent with left L4 radiculopathy and leftt SI joint inflammation and left piriformis syndrome. Patient currently exhibits marked decrease in trunk AROM, decreased bilateral LE strength, difficulty with sit to stand transfers, limited stand/walk tolerance with intermittently antalgic gait pattern and severe left LE radicular pain. Patient will continue to benefit from skilled PT services to modify and progress therapeutic interventions, address functional mobility deficits, address ROM deficits, address strength deficits, analyze and address soft tissue restrictions, analyze and cue movement patterns, analyze and modify body mechanics/ergonomics and assess and modify postural abnormalities to attain remaining goals.      [x]  See Plan of Care  []  See progress note/recertification  []  See Discharge Summary         Progress towards goals / Updated goals:  See POC    PLAN  []  Upgrade activities as tolerated     [x]  Continue plan of care  []  Update interventions per flow sheet       []  Discharge due to:_  []  Other:_      Harman Titus, PT 3/15/2023  10:19 AM

## 2023-03-21 ENCOUNTER — HOSPITAL ENCOUNTER (OUTPATIENT)
Facility: HOSPITAL | Age: 68
Setting detail: RECURRING SERIES
Discharge: HOME | End: 2023-03-24
Payer: MEDICARE

## 2023-03-21 PROCEDURE — 97113 AQUATIC THERAPY/EXERCISES: CPT

## 2023-03-21 NOTE — PROGRESS NOTES
PHYSICAL / OCCUPATIONAL THERAPY - DAILY TREATMENT NOTE (updated )    Patient Name: Lorena Tate    Date: 3/21/2023    : 1955  Insurance: Payor: MEDICARE / Plan: MEDICARE PART A AND B / Product Type: *No Product type* /      Patient  verified Yes     Visit #   Current / Total 2 16   Time   In / Out 1506 1550   Pain   In / Out 5 3   Subjective Functional Status/Changes: \"I have a lot of pain on the left side and it gets easily aggravated when I exercise. \"   Changes to:  Meds, Allergies, Med Hx, Sx Hx? If yes, update Summary List no       TREATMENT AREA =  Other low back pain [M54.59]  Radiculopathy, lumbar region [M54.16]    OBJECTIVE    Therapeutic Procedures: Tx Min Billable or 1:1 Min (if diff from Tx Min) Procedure, Rationale, Specifics   44 39 [id number] Aquatic Therapy (timed):  decrease pain, improve strength, flexibility, and range of motion using the buoyancy, thermal properties and consistent resistance of the water to improve patient's ability to progress to PLOF and address remaining functional goals. (see flow sheet as applicable)     Details if applicable:              Details if applicable:            Details if applicable:            Details if applicable:            Details if applicable:     40 39 MC BC Totals Reminder: bill using total billable min of TIMED therapeutic procedures (example: do not include dry needle or estim unattended, both untimed codes, in totals to left)  8-22 min = 1 unit; 23-37 min = 2 units; 38-52 min = 3 units; 53-67 min = 4 units; 68-82 min = 5 units   Total Total     [x]  Patient Education billed concurrently with other procedures   [x] Review HEP    [] Progressed/Changed HEP, detail:    [] Other detail:       Objective Information/Functional Measures/Assessment    Patient introduced to Basic Aquatic Exercise Program for Management of Low Back Pain.  Patient demonstrates tendency to push exercises to point of increased pain, requiring frequent verbal cues for

## 2023-03-23 ENCOUNTER — HOSPITAL ENCOUNTER (OUTPATIENT)
Facility: HOSPITAL | Age: 68
Setting detail: RECURRING SERIES
Discharge: HOME | End: 2023-03-26
Payer: MEDICARE

## 2023-03-23 PROCEDURE — 97113 AQUATIC THERAPY/EXERCISES: CPT

## 2023-03-23 NOTE — PROGRESS NOTES
7-9/10                 Current: Same as IE                    Patent will improve lift /carry tolerance to 15 pounds to be able to tolerate laundry and groceries. Status at IE: lift/carry tolerance <5 pounds. Current: Same as IE                    Patient will improve FOTO (an established functional score where 100 = no disability) score to 58 points to demonstrate improvement in functional status.                  Status at IE:40                 Current: Same as IE       PLAN  Yes  Continue plan of care  []  Upgrade activities as tolerated  []  Discharge due to :  []  Other:    Inocente New PT    3/23/2023    1:40 PM    Future Appointments   Date Time Provider Lauren Ramesh   3/23/2023  3:00 PM MELONIE Cain [facility]   3/28/2023  1:00 PM MELONIE Cain [facility]   3/30/2023  2:30 PM MELONIE Cain [facility]

## 2023-03-28 ENCOUNTER — HOSPITAL ENCOUNTER (OUTPATIENT)
Facility: HOSPITAL | Age: 68
Setting detail: RECURRING SERIES
Discharge: HOME | End: 2023-03-31
Payer: MEDICARE

## 2023-03-28 PROCEDURE — 97113 AQUATIC THERAPY/EXERCISES: CPT

## 2023-03-28 NOTE — PROGRESS NOTES
PHYSICAL / OCCUPATIONAL THERAPY - DAILY TREATMENT NOTE (updated )    Patient Name: Walt Posada    Date: 3/28/2023    : 1955  Insurance: Payor: MEDICARE / Plan: MEDICARE PART A AND B / Product Type: *No Product type* /      Patient  verified Yes     Visit #   Current / Total 4 16   Time   In / Out 1256 1346   Pain   In / Out 5 3   Subjective Functional Status/Changes: \"The pain in the left leg has been a little more active past couple days. Still goes away when I rest, but happens quickly when I do things. \"   Changes to:  Meds, Allergies, Med Hx, Sx Hx? If yes, update Summary List no       TREATMENT AREA =  Other low back pain [M54.59]  Radiculopathy, lumbar region [M54.16]    OBJECTIVE    Therapeutic Procedures: Tx Min Billable or 1:1 Min (if diff from Tx Min) Procedure, Rationale, Specifics   50 40 [id number] Aquatic Therapy (timed):  decrease pain, improve strength, flexibility, and range of motion using the buoyancy, thermal properties and consistent resistance of the water to improve patient's ability to progress to PLOF and address remaining functional goals. (see flow sheet as applicable)     Details if applicable:              Details if applicable:            Details if applicable:     48 40 MC BC Totals Reminder: bill using total billable min of TIMED therapeutic procedures (example: do not include dry needle or estim unattended, both untimed codes, in totals to left)  8-22 min = 1 unit; 23-37 min = 2 units; 38-52 min = 3 units; 53-67 min = 4 units; 68-82 min = 5 units   Total Total     [x]  Patient Education billed concurrently with other procedures   [x] Review HEP    [] Progressed/Changed HEP, detail:    [] Other detail:       Objective Information/Functional Measures/Assessment    Patient report of primary exacerbations of radicular symptoms occurring with physical activity indicate need for emphasis on trunk stabilization exercises.  Instructing patient in how to maximize core

## 2023-03-30 ENCOUNTER — HOSPITAL ENCOUNTER (OUTPATIENT)
Facility: HOSPITAL | Age: 68
Setting detail: RECURRING SERIES
End: 2023-03-30
Payer: MEDICARE

## 2023-03-30 PROCEDURE — 97113 AQUATIC THERAPY/EXERCISES: CPT

## 2023-03-30 NOTE — PROGRESS NOTES
Demond Alan is a 63 year old male here for  Chief Complaint   Patient presents with   • Cancer     Denies latex allergy or sensitivity.    Medication verified, no changes.  PCP and Pharmacy verified.    Social History     Tobacco Use   Smoking Status Never Smoker   Smokeless Tobacco Former User   • Types: Snuff, Chew   Tobacco Comment    Uses chew ~ 30 years/ tin/day     Advance Directives Filed: No    ECOG:   ECOG [11/15/21 0812]   ECOG Performance Status 0       Vitals:    Visit Vitals  /74   Pulse 82   Temp 98.2 °F (36.8 °C) (Temporal)   Resp 16   Wt 114.5 kg (252 lb 8 oz)   SpO2 98%   BMI 33.32 kg/m²       These vital signs are:  Within defined parameters (Per Reference \"Defined Limits Hospital Outpatient Department (HOD)\")    Height: No.  Ht Readings from Last 1 Encounters:   09/20/21 6' 0.99\" (1.854 m)     Weight:Yes, shoes off.  Wt Readings from Last 3 Encounters:   11/15/21 114.5 kg (252 lb 8 oz)   10/18/21 113.4 kg (250 lb 1.6 oz)   09/20/21 113.9 kg (251 lb 1.6 oz)       BMI: Body mass index is 33.32 kg/m².    REVIEW OF SYSTEMS  GENERAL:  Patient denies headache, fevers, chills, night sweats, excessive fatigue, change in appetite, weight loss, dizziness  ALLERGIC/IMMUNOLOGIC: Verified allergies: Yes  EYES:  Patient denies significant visual difficulties, double vision, blurred vision  ENT/MOUTH: Patient denies problems with hearing, sore throat, sinus drainage, mouth sores  ENDOCRINE:  Patient denies diabetes, thyroid disease, hormone replacement, hot flashes  HEMATOLOGIC/LYMPHATIC: Patient denies easy bruising, bleeding, tender lymph nodes, swollen lymph nodes  BREASTS: Patient denies abnormal masses of breast, nipple discharge, pain  RESPIRATORY:  Patient denies lung pain with breathing, cough, coughing up blood, shortness of breath  CARDIOVASCULAR:  Patient denies anginal chest pain, palpitations, shortness of breath when lying flat, peripheral edema  GASTROINTESTINAL: Patient denies  PHYSICAL / OCCUPATIONAL THERAPY - DAILY TREATMENT NOTE (updated )    Patient Name: Tahira Perez    Date: 3/30/2023    : 1955  Insurance: Payor: MEDICARE / Plan: MEDICARE PART A AND B / Product Type: *No Product type* /      Patient  verified Yes     Visit #   Current / Total 5 16   Time   In / Out 1418 1516   Pain   In / Out 5 3   Subjective Functional Status/Changes: \"My low back is doing well, but I have a lot of pain and tightness feeling on the outside of the thigh. \"   Changes to:  Meds, Allergies, Med Hx, Sx Hx? If yes, update Summary List no       TREATMENT AREA =  Other low back pain [M54.59]  Radiculopathy, lumbar region [M54.16]    OBJECTIVE    Therapeutic Procedures: Tx Min Billable or 1:1 Min (if diff from Tx Min) Procedure, Rationale, Specifics   58 40 [id number] Aquatic Therapy (timed):  decrease pain, improve strength, flexibility, and range of motion using the buoyancy, thermal properties and consistent resistance of the water to improve patient's ability to progress to PLOF and address remaining functional goals. (see flow sheet as applicable)     Details if applicable:       62 40 [facility] Totals Reminder: bill using total billable min of TIMED therapeutic procedures (example: do not include dry needle or estim unattended, both untimed codes, in totals to left)  8-22 min = 1 unit; 23-37 min = 2 units; 38-52 min = 3 units; 53-67 min = 4 units; 68-82 min = 5 units   Total Total     [x]  Patient Education billed concurrently with other procedures   [x] Review HEP    [] Progressed/Changed HEP, detail:    [] Other detail:       Objective Information/Functional Measures/Assessment    Patient instructed in additional stretches to incorporate into aquatic exercise program focused on stretching lateral and posterior thigh. Demonstrated good tolerance for new stretches.     Patient will continue to benefit from skilled PT / OT services to modify and progress therapeutic interventions, analyze and abdominal pain , nausea, vomiting, diarrhea, GI bleeding, constipation, change in bowel habits, heartburn, sensation of feeling full, difficulty swallowing  : Patient denies blood in the urine, burning with urination, frequency, urgency, hesitancy, incontinence  MUSCULOSKELETAL:  Patient denies joint pain, bone pain, joint swelling, redness, decreased range of motion  SKIN:  Patient denies chronic rashes, inflammation, ulcerations, skin changes, itching  NEUROLOGIC:  Patient denies loss of balance, areas of focal weakness, abnormal gait, sensory problems, numbness, tingling  PSYCHIATRIC: Patient denies insomnia, depression, anxiety    This patient reported abnormal symptoms that needed immediate verbal communication: No     address functional mobility deficits, analyze and address ROM deficits, analyze and address strength deficits, analyze and address soft tissue restrictions, analyze and cue for proper movement patterns, analyze and modify for postural abnormalities, analyze and address imbalance/dizziness, and instruct in home and community integration to address functional deficits and attain remaining goals. Progress toward goals / Updated goals:  []  See Progress Note/Recertification    Short Term Goals: To be accomplished in 4 weeks:                 Patient will demonstrate independence in [facility] for Management of Low Back Pain. Status at IE: Patient instructed in initial land based HEP and will begin aquatic instruction at first treatment session. Current: Patient introduced to Basic Aquatic Exercise Program for Management of Low Back Pain. In-progress, 3/21/2023                    Patient will increase lumbar ROM to flexion fingertips to floor and extension 30 degrees to aid in completion of ADLs. Status at IE: flexion fingertips 7 inches from floor, extnesion 22 degrees. Current: flexion fingertips 6 inches from floor, extension 22 degrees. In-progress, 3/28/2023     Long Term Goals: To be accomplished in 8 weeks:                 Patient will demonstrate independence in Comprehensive Aquatic Exercise Program for Management of Low Back Pain. Status at IE: Patient instructed in initial land based HEP and will begin aquatic instruction at first treatment session. Current: Same as IE     Patient will increase Beltran LE strength to 5/5 MMT throughout to aid in completion of ADLs. Status at IE: left LE 3+/5, right LE 4/5                 Current: Same as IE                    Patient will report pain no greater than 1-4/10 throughout entire day to aid in completion of ADLs.

## 2023-04-04 ENCOUNTER — HOSPITAL ENCOUNTER (OUTPATIENT)
Facility: HOSPITAL | Age: 68
Setting detail: RECURRING SERIES
Discharge: HOME | End: 2023-04-07
Payer: MEDICARE

## 2023-04-04 PROCEDURE — 97113 AQUATIC THERAPY/EXERCISES: CPT

## 2023-04-04 NOTE — PROGRESS NOTES
Patient will increase Beltran LE strength to 5/5 MMT throughout to aid in completion of ADLs. Status at IE: left LE 3+/5, right LE 4/5                 Current: Same as IE                    Patient will report pain no greater than 1-4/10 throughout entire day to aid in completion of ADLs. Status at IE: 7-9/10                 Current: Pain intensity reduced to 5-6/10. In-progress, 4/4/2023                    Patent will improve lift /carry tolerance to 15 pounds to be able to tolerate laundry and groceries. Status at IE: lift/carry tolerance <5 pounds. Current: Same as IE                    Patient will improve FOTO (an established functional score where 100 = no disability) score to 58 points to demonstrate improvement in functional status.                  Status at IE:40                 Current: Same as IE    PLAN  Yes  Continue plan of care  []  Upgrade activities as tolerated  []  Discharge due to :  []  Other:    Simi Penpadmini, PT    4/4/2023    12:54 PM    Future Appointments   Date Time Provider Lauren Ramesh   4/4/2023  1:00 PM Simi Penning, PT [facility]   4/6/2023  1:30 PM Simi Penning, PT [facility]   4/18/2023 12:50 PM Simi Penning, PT [facility]   4/20/2023  2:10 PM Simi Penning, PT [facility]

## 2023-04-18 ENCOUNTER — HOSPITAL ENCOUNTER (OUTPATIENT)
Facility: HOSPITAL | Age: 68
Setting detail: RECURRING SERIES
Discharge: HOME | End: 2023-04-21
Payer: MEDICARE

## 2023-04-18 PROCEDURE — 97113 AQUATIC THERAPY/EXERCISES: CPT

## 2023-04-18 NOTE — PROGRESS NOTES
PHYSICAL / OCCUPATIONAL THERAPY - DAILY TREATMENT NOTE (updated )    Patient Name: Flower Painting    Date: 2023    : 1955  Insurance: Payor: MEDICARE / Plan: MEDICARE PART A AND B / Product Type: *No Product type* /      Patient  verified Yes     Visit #   Current / Total 8 16   Time   In / Out 1254 1358   Pain   In / Out 6 2   Subjective Functional Status/Changes: \"The aquatic exercises really help and I am feeling better overall. But, the pain still flares up if I am still for very long, especially if I am sitting for a long period of time. Changes to:  Meds, Allergies, Med Hx, Sx Hx? If yes, update Summary List no       TREATMENT AREA =  Other low back pain [M54.59]  Radiculopathy, lumbar region [M54.16]    OBJECTIVE    Therapeutic Procedures: Tx Min Billable or 1:1 Min (if diff from Tx Min) Procedure, Rationale, Specifics   64 40 [id number] Aquatic Therapy (timed):  decrease pain, improve strength, flexibility, and range of motion using the buoyancy, thermal properties and consistent resistance of the water to improve patient's ability to progress to PLOF and address remaining functional goals. (see flow sheet as applicable)     Details if applicable:       59 40 [facility] Totals Reminder: bill using total billable min of TIMED therapeutic procedures (example: do not include dry needle or estim unattended, both untimed codes, in totals to left)  8-22 min = 1 unit; 23-37 min = 2 units; 38-52 min = 3 units; 53-67 min = 4 units; 68-82 min = 5 units   Total Total     [x]  Patient Education billed concurrently with other procedures   [x] Review HEP    [] Progressed/Changed HEP, detail:    [] Other detail:       Objective Information/Functional Measures/Assessment    Patient has been well motivated, consistent and diligent with Aquatic Exercise Program and responds very well to aquatic exercises with marked decrease in pain intensity after each session.  However, carryover of pain relief between

## 2023-04-18 NOTE — PROGRESS NOTES
[facility] at [address] [place] [address] Drive: [phone]   Fax: [phone]  Progress Note  Patient name: Eual Kawasaki Start of Care: 3/15/2023   Referral source: Karen Rizo [place] : 1955                  Medical Diagnosis: Other low back pain [M54.59]  Radiculopathy, lumbar region [M54.16]      Onset Date: 2023                  Treatment Diagnosis:  M54.59  OTHER LOWER BACK PAIN                                          Prior Hospitalization: see medical history Provider#: [id number]   Medications: Verified on Patient Summary List      ===========================================================================================  Assessment / Summary of Care:  Eual Kawasaki is a 77 y.o.  female who has been well motivated, consistent and diligent with Aquatic Exercise Program and responds very well to aquatic exercises with marked decrease in pain intensity after each session. However, carryover of pain relief between sessions is limited with pain primarily recurring after long duration sitting. Trunk AROM, LE strength and endurance are all improving with progressive aquatic exercises. Patient will continue to benefit from skilled PT / OT services to modify and progress therapeutic interventions, analyze and address functional mobility deficits, analyze and address ROM deficits, analyze and address strength deficits, analyze and address soft tissue restrictions, analyze and cue for proper movement patterns, analyze and modify for postural abnormalities, analyze and address imbalance/dizziness, and instruct in home and community integration to address functional deficits and attain remaining goals.    ===========================================================================================    Plan:Continue therapy per initial plan/protocol at a frequency of  2 x per week for 4 weeks    Progress Towards Goals:   Short Term Goals:  To be

## 2023-04-20 ENCOUNTER — HOSPITAL ENCOUNTER (OUTPATIENT)
Facility: HOSPITAL | Age: 68
Setting detail: RECURRING SERIES
Discharge: HOME | End: 2023-04-23
Payer: MEDICARE

## 2023-04-20 PROCEDURE — 97113 AQUATIC THERAPY/EXERCISES: CPT

## 2023-04-25 ENCOUNTER — APPOINTMENT (OUTPATIENT)
Facility: HOSPITAL | Age: 68
End: 2023-04-25
Payer: MEDICARE

## 2023-05-09 ENCOUNTER — APPOINTMENT (OUTPATIENT)
Facility: HOSPITAL | Age: 68
End: 2023-05-09
Payer: MEDICARE

## 2023-05-11 ENCOUNTER — HOSPITAL ENCOUNTER (OUTPATIENT)
Facility: HOSPITAL | Age: 68
Setting detail: RECURRING SERIES
Discharge: HOME | End: 2023-05-14
Payer: MEDICARE

## 2023-05-11 PROCEDURE — 97113 AQUATIC THERAPY/EXERCISES: CPT

## 2023-05-16 ENCOUNTER — APPOINTMENT (OUTPATIENT)
Facility: HOSPITAL | Age: 68
End: 2023-05-16
Payer: MEDICARE

## 2023-05-18 ENCOUNTER — HOSPITAL ENCOUNTER (OUTPATIENT)
Facility: HOSPITAL | Age: 68
Setting detail: RECURRING SERIES
Discharge: HOME | End: 2023-05-21
Payer: MEDICARE

## 2023-05-18 PROCEDURE — 97113 AQUATIC THERAPY/EXERCISES: CPT

## 2023-05-25 ENCOUNTER — HOSPITAL ENCOUNTER (OUTPATIENT)
Facility: HOSPITAL | Age: 68
Setting detail: RECURRING SERIES
Discharge: HOME | End: 2023-05-28
Payer: MEDICARE

## 2023-05-25 PROCEDURE — 97113 AQUATIC THERAPY/EXERCISES: CPT

## 2023-06-01 ENCOUNTER — APPOINTMENT (OUTPATIENT)
Facility: HOSPITAL | Age: 68
End: 2023-06-01
Payer: MEDICARE

## 2023-06-06 ENCOUNTER — APPOINTMENT (OUTPATIENT)
Facility: HOSPITAL | Age: 68
End: 2023-06-06
Payer: MEDICARE

## 2023-06-20 ENCOUNTER — HOSPITAL ENCOUNTER (OUTPATIENT)
Facility: HOSPITAL | Age: 68
Setting detail: RECURRING SERIES
Discharge: HOME | End: 2023-06-23
Payer: MEDICARE

## 2023-06-20 PROCEDURE — 97113 AQUATIC THERAPY/EXERCISES: CPT

## 2023-06-20 NOTE — PROGRESS NOTES
PHYSICAL / OCCUPATIONAL THERAPY - DAILY TREATMENT NOTE (updated )    Patient Name: Keenan Zarate    Date: 2023    : 1955  Insurance: Payor: MEDICARE / Plan: MEDICARE PART A AND B / Product Type: *No Product type* /      Patient  verified Yes     Visit #   Current / Total 14 16   Time   In / Out 1323 1428   Pain   In / Out 1-2 0   Subjective Functional Status/Changes: \"Sitting is what sets off my back and thigh pain the most. I have been more active at home and limiting my sitting, so the pain has been staying less. \"   Changes to:  Meds, Allergies, Med Hx, Sx Hx? If yes, update Summary List no       TREATMENT AREA =  Other low back pain [M54.59]  Radiculopathy, lumbar region [M54.16]    OBJECTIVE    Therapeutic Procedures: Tx Min Billable or 1:1 Min (if diff from Tx Min) Procedure, Rationale, Specifics   65 40 [id number] Aquatic Therapy (timed):  decrease pain, improve strength, flexibility, and range of motion using the buoyancy, thermal properties and consistent resistance of the water to improve patient's ability to progress to PLOF and address remaining functional goals. (see flow sheet as applicable)     Details if applicable:              Details if applicable:            Details if applicable:     72 40 [facility] Totals Reminder: bill using total billable min of TIMED therapeutic procedures (example: do not include dry needle or estim unattended, both untimed codes, in totals to left)  8-22 min = 1 unit; 23-37 min = 2 units; 38-52 min = 3 units; 53-67 min = 4 units; 68-82 min = 5 units   Total Total     [x]  Patient Education billed concurrently with other procedures   [x] Review HEP    [] Progressed/Changed HEP, detail:    [] Other detail:       Objective Information/Functional Measures/Assessment    Patient has been well motivated and diligent with Aquatic PT and in land based HEP as instructed. As a result, patient has made good progress towards goals.  Lumbar and left LE pain has decreased
Physical Therapy Discharge Instructions    [facility] [place] [facility] [place] [address] [place] [address]  Phone: [phone]   Fax: [phone]      Patient: Stacy Guzman  : 1955    Continue Aquatic Exercise Program 3 to 4 times per week      Continue with    [x] Ice  as needed      [x] Heat           Follow up with MD:     [] Upon completion of therapy     [x] As needed      Recommendations:     []   Return to activity with home program    [x]   Return to activity with the following modifications:       []Post Rehab Program    [x]Join Independent aquatic program     []Return to/join local gym      Additional Comments: Please contact clinic at above phone number if you have any questions regarding Home Exercise Program or self care instructions.
Comprehensive Aquatic Exercise Program for Management of Low Back Pain. Status at IE: Patient instructed in initial land based HEP and will begin aquatic instruction at first treatment session. Current: Patient now independent in Comprehensive Aquatic Exercise Program for Management of Low Back Pain and ihas been provided a written copy of full program.       MET, 6/20/2023         Patient will increase Beltran LE strength to 5/5 MMT throughout to aid in completion of ADLs. Status at IE: left LE 3+/5, right LE 4/5                 Current: left LE 5/5, right LE 5/5              MET, 6/20/2023                    Patient will report pain no greater than 1-4/10 throughout entire day to aid in completion of ADLs. Status at IE: 7-9/10                 Current: Pain intensity now reduced to 1-4/10 provided patient limits length of time sitting at home. MET, 6/20/2023                       Patent will improve lift /carry tolerance to 15 pounds to be able to tolerate laundry and groceries. Status at IE: lift/carry tolerance <5 pounds. Current: Lift tolerance improved to 10 pounds. In-progress, 5/18/2023                    Patient will improve FOTO (an established functional score where 100 = no disability) score to 58 points to demonstrate improvement in functional status. Status at IE:40                 Current: 65                  MET, 6/20/2023    Assessment/ Summary of Care:   Patient has been well motivated and diligent with Aquatic PT and in land based HEP as instructed. As a result, patient has made good progress towards goals. Lumbar and left LE pain has decreased in intensity and diminished in size of area involved. Bilateral LE strength and trunk AROM have also improved.  Patient has pool access through her gym membership and she is now sufficiently independent in Comprehensive

## 2023-10-19 NOTE — PROGRESS NOTES
PT DAILY TREATMENT NOTE    Patient Name: Wade Rojas  Date:2022  : 1955  [x]  Patient  Verified  Payor: Betty Alves / Plan: [place] & B / Product Type: Medicare /    In time: 762  Out time: 108  Total Treatment Time (min): 49  Total Timed Codes (min): 49  1:1 Treatment Time ( [address] only): 40   Visit #: 8 of 16    Treatment Dx: Other low back pain [M54.59]    SUBJECTIVE  Pain Level (0-10 scale): 3  Any medication changes, allergies to medications, adverse drug reactions, diagnosis change, or new procedure performed?: [x] No    [] Yes (see summary sheet for update)  Subjective functional status/changes:   [] No changes reported  \"I am noticing I can be more active without the pain getting so severe as I progress with the aquatic exercises. I went to the water park with the grandkids over the weekend and tolerated much better that I used to. \"    OBJECTIVE    15 min Therapeutic Exercise:  [x] See flow sheet :   Rationale: increase ROM, increase strength and decrease pain to improve the patients ability to complete ADLs  ambulation safety and efficiency in order to improve patient's ability to safely ambulate at home for self care.         15 min Therapeutic Activity:  [x]  See flow sheet :   Rationale: increase ROM, increase strength and improve coordination  to improve the patients ability to Complete ADLS     10 min Neuromuscular Re-education:  [x]  See flow sheet :   Rationale: improve coordination, improve balance and increase proprioception  to improve the patients ability to complete ADLS, and decrease falls risk    With   [] TE   [] TA   [] neuro   [] other: Patient Education: [x] Review HEP    [] Progressed/Changed HEP based on:   [] positioning   [] body mechanics   [] transfers   [] heat/ice application    [] other:      Other Objective/Functional Measures: NA     Pain Level (0-10 scale) post treatment: 0    ASSESSMENT/Changes in Function: Patient noting improved activity tolerance with reduced frequency of increased pain with activity as she progresses in aquatic exercise program. Patient responds well to treatment session. No adverse effects were noted from today's treatment session. Patient will continue to benefit from skilled PT services to modify and progress therapeutic interventions, address functional mobility deficits, address ROM deficits, address strength deficits, analyze and address soft tissue restrictions, analyze and cue movement patterns, analyze and modify body mechanics/ergonomics, assess and modify postural abnormalities,  and instruct in home and community integration to attain remaining goals. [x]  See Plan of Care  []  See progress note/recertification  []  See Discharge Summary         Progress towards goals / Updated goals:  Short Term Goals: To be accomplished in 4 weeks:                   Patient will report compliance with HEP 1x/day to aid in rehabilitation program.                   Status at IE: Patient instructed in and provided written copy of initial Home Exercise Program.                   Current: Patient progressing in level of independence with Comprehensive Aquatic Exercise Program for Management of Low Back Pain. 8/2/2022, in progress                      Patient will increase lumbar ROM flexion to fingertips to floor to aid in completion of ADLs. Status at IE: lumbar flexion fingertips 8 inches from floor. Current: lumbar flexion fingertips 6 inches from floor. 8/2/2022, in progress     Long Term Goals: To be accomplished in 8 weeks:                   Patient will increase Bayron LE strength to 5/5 MMT throughout to aid in completion of ADLs.                    Status at IE: bilateral LE strength 3+/5                   Current: bilateral LE strength improved to 4/5.        8/2/2022, in progress                      Patient will report pain no greater than 1-3/10 throughout entire day to aid in completion of ADLs.                   Status at IE: 3-7/10                   Current: Pain level improved to 0-4/10.    7/15/2022, in progress                      Patent will improve stand/walk tolerance to 30 minutes to be able to  complete household ADLs. Status at IE: <10 minutes. Current: Stand/walk tolerance improved to 10 to 15 minutes. 8/2/2022, in progress                      Patient will improve FOTO (an established functional score where 100 = no disability) score to 57 points to demonstrate improvement in functional status.                    Status at IE:52                   Current: 53           8/9/2022, in progress    PLAN  []  Upgrade activities as tolerated     [x]  Continue plan of care  []  Update interventions per flow sheet       []  Discharge due to:_  []  Other:_      Daria Ashton, PT 8/9/2022  8:16 AM    Future Appointments   Date Time Provider Herbie Johnston   8/11/2022  8:45 AM Nesha Ghotra [facility] Detail Level: None Performed By: Laya CHAVEZ CMA Lot # (Optional): [id number] Urine Pregnancy Test Result: negative Expiration Date (Optional): 5/31/2024

## 2023-12-05 ENCOUNTER — HOSPITAL ENCOUNTER (OUTPATIENT)
Facility: HOSPITAL | Age: 68
Setting detail: RECURRING SERIES
Discharge: HOME | End: 2023-12-08
Payer: MEDICARE

## 2023-12-05 PROCEDURE — 97161 PT EVAL LOW COMPLEX 20 MIN: CPT

## 2023-12-05 PROCEDURE — 97110 THERAPEUTIC EXERCISES: CPT

## 2023-12-07 ENCOUNTER — HOSPITAL ENCOUNTER (OUTPATIENT)
Facility: HOSPITAL | Age: 68
Setting detail: RECURRING SERIES
Discharge: HOME | End: 2023-12-10
Payer: MEDICARE

## 2023-12-07 PROCEDURE — 97112 NEUROMUSCULAR REEDUCATION: CPT

## 2023-12-07 PROCEDURE — 97110 THERAPEUTIC EXERCISES: CPT

## 2023-12-07 PROCEDURE — 97530 THERAPEUTIC ACTIVITIES: CPT

## 2023-12-07 NOTE — PROGRESS NOTES
Current: Same as IE                    Patient will report pain less than 1-2/10 average to aid in completion of ADLs. Status at IE:2-5/10                 Current: Same as IE                    Patient will perform 5 or more pain free squats with good form/technique to aid in completion of ADLs. Status at IE: moderate difficulty with one full squat. Current: Same as IE                    Patient will improve FOTO (an established functional score where 100 = no disability) to 76 points overall to demonstrate improvement in functional ability.                  Status at IE:61                 Current: Same as IE    PLAN  Yes  Continue plan of care  []  Upgrade activities as tolerated  []  Discharge due to:   []  Other:    Jovanny Harman PT    12/7/2023    5:02 PM    Future Appointments   Date Time Provider [address]  [address]   12/12/2023  4:10 PM Jovanny Harman PT [facility]   12/14/2023  3:30 PM Jovanny Harman PT [facility]   12/19/2023 10:50 AM Jovanny Harman PT [facility]   12/21/2023 12:10 PM Maury Trejo PT [facility]   12/28/2023 10:50 AM Jovanny Harman PT [facility]

## 2023-12-12 ENCOUNTER — HOSPITAL ENCOUNTER (OUTPATIENT)
Facility: HOSPITAL | Age: 68
Setting detail: RECURRING SERIES
Discharge: HOME | End: 2023-12-15
Payer: MEDICARE

## 2023-12-12 PROCEDURE — 97530 THERAPEUTIC ACTIVITIES: CPT

## 2023-12-12 PROCEDURE — 97110 THERAPEUTIC EXERCISES: CPT

## 2023-12-12 PROCEDURE — 97112 NEUROMUSCULAR REEDUCATION: CPT

## 2023-12-12 NOTE — PROGRESS NOTES
Current: Same as IE                    Patient will report pain less than 1-2/10 average to aid in completion of ADLs. Status at IE:2-5/10                 Current: Same as IE                    Patient will perform 5 or more pain free squats with good form/technique to aid in completion of ADLs. Status at IE: moderate difficulty with one full squat. Current: Same as IE                    Patient will improve FOTO (an established functional score where 100 = no disability) to 76 points overall to demonstrate improvement in functional ability.                  Status at IE:61                 Current: Same as IE    PLAN  Yes  Continue plan of care  []  Upgrade activities as tolerated  []  Discharge due to:   []  Other:    Myriam Ngo PT    12/12/2023    3:50 PM    Future Appointments   Date Time Provider [address]   12/12/2023  4:10 PM Myriam Ngo PT [facility]   12/14/2023  3:30 PM Myriam Ngo PT [facility]   12/19/2023 10:50 AM Myriam Ngo PT [facility]   12/21/2023 12:10 PM Harman Trejo, PT [facility]   12/28/2023 10:50 AM Myriam Ngo PT [facility]

## 2023-12-21 ENCOUNTER — APPOINTMENT (OUTPATIENT)
Facility: HOSPITAL | Age: 68
End: 2023-12-21
Payer: MEDICARE

## 2023-12-28 ENCOUNTER — APPOINTMENT (OUTPATIENT)
Facility: HOSPITAL | Age: 68
End: 2023-12-28
Payer: MEDICARE

## 2023-12-28 ENCOUNTER — HOSPITAL ENCOUNTER (OUTPATIENT)
Facility: HOSPITAL | Age: 68
Setting detail: RECURRING SERIES
Discharge: HOME | End: 2023-12-31
Payer: MEDICARE

## 2023-12-28 PROCEDURE — 97112 NEUROMUSCULAR REEDUCATION: CPT

## 2023-12-28 PROCEDURE — 97016 VASOPNEUMATIC DEVICE THERAPY: CPT

## 2023-12-28 PROCEDURE — 97110 THERAPEUTIC EXERCISES: CPT

## 2023-12-28 PROCEDURE — 97530 THERAPEUTIC ACTIVITIES: CPT

## 2023-12-28 NOTE — PROGRESS NOTES
PHYSICAL / OCCUPATIONAL THERAPY - DAILY TREATMENT NOTE (updated )    Patient Name: Nhung Miranda    Date: 2023    : 1955  Insurance: Payor: MEDICARE / Plan: MEDICARE PART A AND B / Product Type: *No Product type* /      Patient  verified Yes     Visit #   Current / Total 6 16   Time   In / Out 0810 0918   Pain   In / Out 0 0   Subjective Functional Status/Changes: \"I can use the knee fairly normally for most things around the house but I still have trouble with squatting and kneeling.\"   Changes to:  Meds, Allergies, Med Hx, Sx Hx?  If yes, update Summary List no       TREATMENT AREA =  Right knee pain [M25.561]    OBJECTIVE  Modalities Rationale:     decrease edema and decrease pain to improve patient's ability to Complete ADLS              10 min [x]  Vasopneumatic Device, press/temp: Medium, low         If using vaso (need to measure limb vaso being performed on)      pre-treatment girth : 34.2 cm.      post-treatment girth : 33.7 cm.      measured at (landmark location): mid-patella        min []  Other:     [x] Skin assessment post-treatment (if applicable):    [x]  intact    []  redness- no adverse reaction     []redness - adverse reaction:      Therapeutic Procedures:  Tx Min Billable or 1:1 Min (if diff from Tx Min) Procedure, Rationale, Specifics   28  98381 Therapeutic Exercise (timed):  increase ROM, strength, coordination, balance, and proprioception to improve patient's ability to progress to PLOF and address remaining functional goals. (see flow sheet as applicable)     Details if applicable:       15  41059 Therapeutic Activity (timed):  use of dynamic activities replicating functional movements to increase ROM, strength, coordination, balance, and proprioception in order to improve patient's ability to progress to PLOF and address remaining functional goals.  (see flow sheet as applicable)     Details if applicable:     15  64236 Neuromuscular Re-Education (timed):  improve

## 2024-01-03 ENCOUNTER — HOSPITAL ENCOUNTER (OUTPATIENT)
Facility: HOSPITAL | Age: 69
Setting detail: RECURRING SERIES
Discharge: HOME | End: 2024-01-06
Payer: MEDICARE

## 2024-01-03 PROCEDURE — 97016 VASOPNEUMATIC DEVICE THERAPY: CPT

## 2024-01-03 PROCEDURE — 97110 THERAPEUTIC EXERCISES: CPT

## 2024-01-03 PROCEDURE — 97112 NEUROMUSCULAR REEDUCATION: CPT

## 2024-01-03 PROCEDURE — 97530 THERAPEUTIC ACTIVITIES: CPT

## 2024-01-03 NOTE — PROGRESS NOTES
PHYSICAL / OCCUPATIONAL THERAPY - DAILY TREATMENT NOTE (updated )    Patient Name: Nhung Miranda    Date: 1/3/2024    : 1955  Insurance: Payor: MEDICARE / Plan: MEDICARE PART A AND B / Product Type: *No Product type* /      Patient  verified Yes     Visit #   Current / Total 7 16   Time   In / Out 1050 11:58   Pain   In / Out 0 0   Subjective Functional Status/Changes: \"My right knee is doing very well and I think I might be ready to continue on my own pretty soon.\"   Changes to:  Meds, Allergies, Med Hx, Sx Hx?  If yes, update Summary List no       TREATMENT AREA =  Right knee pain [M25.561]    OBJECTIVE  Modalities Rationale:     decrease edema and decrease pain to improve patient's ability to Complete ADLS  10 min [x]  Vasopneumatic Device, press/temp: Medium, low      If using vaso (need to measure limb vaso being performed on)      pre-treatment girth : 34.0 cm.      post-treatment girth : 32.9 cm.      measured at (landmark location): mid-patella       min []  Other:    [x] Skin assessment post-treatment (if applicable):    [x]  intact    []  redness- no adverse reaction     []redness - adverse reaction:        Therapeutic Procedures:  Tx Min Billable or 1:1 Min (if diff from Tx Min) Procedure, Rationale, Specifics   28  06874 Therapeutic Exercise (timed):  increase ROM, strength, coordination, balance, and proprioception to improve patient's ability to progress to PLOF and address remaining functional goals. (see flow sheet as applicable)     Details if applicable:       15  26546 Therapeutic Activity (timed):  use of dynamic activities replicating functional movements to increase ROM, strength, coordination, balance, and proprioception in order to improve patient's ability to progress to PLOF and address remaining functional goals.  (see flow sheet as applicable)     Details if applicable:     15  29702 Neuromuscular Re-Education (timed):  improve balance, coordination, kinesthetic sense,

## 2024-01-05 ENCOUNTER — HOSPITAL ENCOUNTER (OUTPATIENT)
Facility: HOSPITAL | Age: 69
Setting detail: RECURRING SERIES
Discharge: HOME | End: 2024-01-08
Payer: MEDICARE

## 2024-01-05 PROCEDURE — 97110 THERAPEUTIC EXERCISES: CPT

## 2024-01-05 PROCEDURE — 97016 VASOPNEUMATIC DEVICE THERAPY: CPT

## 2024-01-05 PROCEDURE — 97112 NEUROMUSCULAR REEDUCATION: CPT

## 2024-01-05 PROCEDURE — 97530 THERAPEUTIC ACTIVITIES: CPT

## 2024-01-05 NOTE — PROGRESS NOTES
[facility] at [facility]  [address] [address]  Phone: [phone]   Fax: [phone]    Discharge Summary    Patient name: Nhung Miranda Start of Care: 2023   Referral source: Rosaura Pedraza DO : 1955               Medical Diagnosis: Right knee pain [M25.561]      Onset Date:2023               Treatment Diagnosis:  M25.561  RIGHT KNEE PAIN                                           Prior Hospitalization: see medical history Provider#: [id number]   Medications: Verified on Patient Summary List      Comorbidities: Patient presents s/p right TKA performed 2023 with deficits in right knee extension AROM, decreased strength left knee, abnormal gait pattern, edema left knee, mild to moderate pain, and limited ability to perform household ADLs that involve long term standing or walking.    Visits from Start of Care: 8    Missed Visits: 0    Reporting Period : 2023 to 2023    Goals/Measure of Progress:  Short Term Goals: To be accomplished in 4 weeks:                 Patient will report compliance with HEP at least 1x/day to aid in rehabilitation program.                 Status at IE: Patient instructed in and provided written copy of initial Home Exercise Program.                 Current: Patient now fully independent in comprehensive HEP and instructed in how to progress exercises over time.              MET, 2024                    Patient will demonstrate right knee AROM of 0 to 130 degrees to aid in completion of ADLs.                 Status at IE:4 to 124 degrees                 Current: AROM 0 to 131 degrees                MET, 1/3/2024        Long Term Goals: To be accomplished in 8 weeks:                 Patient will increase strength to 5/5 throughout B LEs to aid in completion of ADLs.                 Status at IE:right knee flex 3+, ext 3+                 Current: right knee flex 5, ext 5             MET, 2024                    
Physical Therapy Discharge Instructions    [facility]  [address] [address]  Phone: [phone]   Fax: [phone]      Patient: Nhung Miranda  : 1955    Continue Home Exercise Program 2 times per day for 6 weeks, then decrease to 4 times per week      Continue with    [x] Ice  as needed      [] Heat           Follow up with MD:     [] Upon completion of therapy     [x] As needed      Recommendations:     []   Return to activity with home program    [x]   Return to activity with the following modifications:       []Post Rehab Program    []Join Independent aquatic program     [x]Return to/join local gym      Additional Comments: Please contact clinic at above phone number if you have any questions regarding Home Exercise Program or self care instructions.    
Status at IE:4 to 124 degrees                 Current: AROM 0 to 131 degrees                MET, 1/3/2024        Long Term Goals: To be accomplished in 8 weeks:                 Patient will increase strength to 5/5 throughout B LEs to aid in completion of ADLs.                 Status at IE:right knee flex 3+, ext 3+                 Current: right knee flex 5, ext 5             MET, 1/5/2024                    Patient will report pain less than 1-2/10 average to aid in completion of ADLs.                 Status at IE:2-5/10                 Current: 0-2/10           MET, 1/5/2024                    Patient will perform 5 or more pain free squats with good form/technique to aid in completion of ADLs.                 Status at IE: moderate difficulty with one full squat.                 Current: Now able to perform ten sit to stand with six pound weight without pain.   MET, 1/3/2024                    Patient will improve FOTO (an established functional score where 100 = no disability) to 76 points overall to demonstrate improvement in functional ability.                 Status at IE:61                 Current: 98          MET, 1/5/2024    PLAN  No  Continue plan of care  []  Upgrade activities as tolerated  [x]  Discharge due to: goals met  []  Other:    Alex Trejo, PT    1/5/2024    10:14 AM    No future appointments.

## 2024-02-02 ENCOUNTER — TELEPHONE (OUTPATIENT)
Facility: HOSPITAL | Age: 69
End: 2024-02-02

## 2024-02-06 ENCOUNTER — HOSPITAL ENCOUNTER (OUTPATIENT)
Facility: HOSPITAL | Age: 69
Setting detail: RECURRING SERIES
Discharge: HOME | End: 2024-02-09
Payer: MEDICARE

## 2024-02-06 PROCEDURE — 97161 PT EVAL LOW COMPLEX 20 MIN: CPT

## 2024-02-06 PROCEDURE — 97110 THERAPEUTIC EXERCISES: CPT

## 2024-02-06 NOTE — PROGRESS NOTES
[facility]  [address] [address]  Phone: [phone]   Fax: [phone]    Plan of Care/ Statement of Necessity for Physical Therapy Services        Patient name: Nhung Miranda Start of Care: 2024   Referral source: Rosaura Pedraza DO : 1955    Medical Diagnosis: Pain in right shoulder [M25.511]      Onset Date: 10/30/2023    Treatment Diagnosis:  M25.511  RIGHT SHOULDER PAIN                                          Prior Hospitalization: see medical history Provider#: [id number]   Medications: Verified on Patient Summary List     Comorbidities: pre-diabetic, low back and left hip pain. Surgical Hx: lumbar fusion x 3, right TKA  Prior Level of Function: independent in all household ADLs, reports very physically active being on feet most of the day       The Plan of Care and following information is based on the information from the initial evaluation.  Assessment/ key information: Patient presents s/p fall forward onto right elbow 10/30/2023 with signs/symtoms of subacute traumatic right bicipital tenosynovitis and rotator cuff tendonitis. Patient currently exhibits marked decrease in right shoulder AROM and strength with mild to moderate pain and moderate difficulty with household ADLs.    Patient will continue to benefit from skilled PT services to modify and progress therapeutic interventions, address functional mobility deficits, address ROM deficits, address strength deficits, analyze and address soft tissue restrictions, analyze and cue movement patterns, analyze and modify body mechanics/ergonomics and assess and modify postural abnormalities to attain remaining goals.    Evaluation Complexity HistoryMEDIUM  Complexity : 1-2 comorbidities / personal factors will impact the outcome/ POC  ; Examination LOW Complexity : 1-2 Standardized tests and measures addressing body structure, function, activity limitation and / or participation in

## 2024-02-06 NOTE — PROGRESS NOTES
PT DAILY TREATMENT NOTE/SHOULDER EVAL 10-18    Patient Name: Nhung Miranda  Date:2024  : 1955  [x]  Patient  Verified  Payor: MEDICARE / Plan: MEDICARE PART A AND B / Product Type: *No Product type* /    In time:930  Out time:1014  Total Treatment Time (min): 25  Visit #: 1 of 16    Medicare/BCBS Only   Total Timed Codes (min):  25 1:1 Treatment Time:  25       Treatment Area: Bicipital tendinitis, right shoulder [M75.21]    SUBJECTIVE  Pain Level (0-10 scale): 1-8/10  []constant []intermittent [x]improving []worsening []no change since onset    Any medication changes, allergies to medications, adverse drug reactions, diagnosis change, or new procedure performed?: [x] No    [] Yes (see summary sheet for update)  Subjective functional status/changes:     Patient has c/c of right shoulder pain since falling forward onto right elbow on 10/30/2023. Patient describes pain as aching at rest, sharp with activity. Pain is worse in PM. Denies numbness/tingling. Reports anterior popping/clicking with flexion. Aggravating factors: active use right UE above shoulder level. Alleviating factors: rest in supported positions, medication.  Denies red flags: SOB, chest pain, dizziness/lightheadedness, blurred/double vision, HA, chills/fevers, night sweats, change in bowel/bladder control, abdominal pain, difficulty swallowing, slurred speech, unexplained weight gain/loss, nausea, vomiting. PMHx: pre-diabetic, low back and left hip pain. Surgical Hx: lumbar fusion x 3. Social Hx: , two level home, social alcohol, no tobacco, retired. PLOF: independent in all household ADLs, reports very physically active being on feet most of the day. CLOF: moderate difficulty with household ADLs, lifting, carrying, vacuuming, shopping, etc.  Diagnostic Imaging: none. Recent falls Hx:one explained fall with mild injury.      OBJECTIVE/EXAMINATION    Precautions: none    19 min [x]Eval                  []Re-Eval       25 min

## 2024-02-08 ENCOUNTER — HOSPITAL ENCOUNTER (OUTPATIENT)
Facility: HOSPITAL | Age: 69
Setting detail: RECURRING SERIES
Discharge: HOME | End: 2024-02-11
Payer: MEDICARE

## 2024-02-08 PROCEDURE — 97140 MANUAL THERAPY 1/> REGIONS: CPT

## 2024-02-08 PROCEDURE — 97530 THERAPEUTIC ACTIVITIES: CPT

## 2024-02-08 PROCEDURE — 97112 NEUROMUSCULAR REEDUCATION: CPT

## 2024-02-08 PROCEDURE — 97110 THERAPEUTIC EXERCISES: CPT

## 2024-02-08 NOTE — PROGRESS NOTES
PHYSICAL / OCCUPATIONAL THERAPY - DAILY TREATMENT NOTE     Patient Name: Nhung Miranda    Date: 2024    : 1955  Insurance: Payor: MEDICARE / Plan: MEDICARE PART A AND B / Product Type: *No Product type* /      Patient  verified Yes     Visit #   Current / Total 2 16   Time   In / Out 8:54 AM 9:48 AM   Pain   In / Out 0 0   Subjective Functional Status/Changes: Patient reports initiating HEP though states that she was unable to complete the exercises that required tband. Patient provided with green tband today.    Changes to:  Allergies, Med Hx, Sx Hx?   no       TREATMENT AREA =  Pain in right shoulder [M25.511]    OBJECTIVE      Therapeutic Procedures:  Tx Min Billable or 1:1 Min (if diff from Tx Min) Procedure, Rationale, Specifics   24  Therapeutic Exercise (timed):  increase ROM, strength, coordination, balance, and proprioception to improve patient's ability to progress to PLOF and address remaining functional goals. (see flow sheet as applicable)    Details if applicable:       10 10 06911 Neuromuscular Re-Education (timed):  improve balance, coordination, kinesthetic sense, posture, core stability and proprioception to improve patient's ability to develop conscious control of individual muscles and awareness of position of extremities in order to progress to PLOF and address remaining functional goals. (see flow sheet as applicable)    Details if applicable:     10 10 37585 Manual Therapy (timed):  decrease pain, increase ROM, and increase tissue extensibility to improve patient's ability to progress to PLOF and address remaining functional goals.  The manual therapy interventions were performed at a separate and distinct time from the therapeutic activities interventions . Details: in supine: PROM to right shoulder. In left s/l: right STJ mobs, STM to right UT and rhomboids.       Details if applicable:     10 10 29217 Therapeutic Activity (timed):  use of dynamic activities

## 2024-02-14 ENCOUNTER — HOSPITAL ENCOUNTER (OUTPATIENT)
Facility: HOSPITAL | Age: 69
Setting detail: RECURRING SERIES
Discharge: HOME | End: 2024-02-17
Payer: MEDICARE

## 2024-02-14 ENCOUNTER — TELEPHONE (OUTPATIENT)
Facility: HOSPITAL | Age: 69
End: 2024-02-14

## 2024-02-14 PROCEDURE — 97530 THERAPEUTIC ACTIVITIES: CPT

## 2024-02-14 PROCEDURE — 97110 THERAPEUTIC EXERCISES: CPT

## 2024-02-14 PROCEDURE — 97112 NEUROMUSCULAR REEDUCATION: CPT

## 2024-02-14 NOTE — PROGRESS NOTES
left)  8-22 min = 1 unit; 23-37 min = 2 units; 38-52 min = 3 units; 53-67 min = 4 units; 68-82 min = 5 units   Total Total       TOTAL TREATMENT TIME:        41       [x]  Patient Education billed concurrently with other procedures   [x] Review HEP    [] Progressed/Changed HEP, detail:    [] Other detail:       Objective Information/Functional Measures/Assessment    Patient very well motivated, but exhibits tendency to push exercises and activities to the point of exacerbating symptoms. Patient instructed to decrease intensity of weight machines in gym to lowest settings to reduce risk of exacerbating symptoms. Patient expressing interest in purchasing resistance band set for home use. Provided patient information on how to purchase appropriate set of bands and initiated instruction in use of home set.    Patient will continue to benefit from skilled PT / OT services to modify and progress therapeutic interventions, analyze and address functional mobility deficits, analyze and address ROM deficits, analyze and address strength deficits, analyze and address soft tissue restrictions, analyze and cue for proper movement patterns, analyze and modify for postural abnormalities, analyze and address imbalance/dizziness, and instruct in home and community integration to address functional deficits and attain remaining goals.    Progress toward goals / Updated goals:  []  See Progress Note/Recertification    Short Term Goals: To be accomplished in 4 weeks:                 Patient will report compliance with initial/basic HEP at least 1x/day to aid in rehabilitation program.                 Status at IE: Patient instructed in and provided written copy of initial Home Exercise Program.                 Current: pt reports initiating HEP 2/8/24                    Patient will display full right shoulder pain free AROM into flexion, abduction and external rotation to aid in completion of ADLs.                 Status at IE: right

## 2024-02-14 NOTE — TELEPHONE ENCOUNTER
Pt called to let us know she is on her way but she is stuck behind buses so she should be 5 min late.

## 2024-02-16 ENCOUNTER — HOSPITAL ENCOUNTER (OUTPATIENT)
Facility: HOSPITAL | Age: 69
Setting detail: RECURRING SERIES
Discharge: HOME | End: 2024-02-19
Payer: MEDICARE

## 2024-02-16 PROCEDURE — 97110 THERAPEUTIC EXERCISES: CPT

## 2024-02-16 PROCEDURE — 97112 NEUROMUSCULAR REEDUCATION: CPT

## 2024-02-16 PROCEDURE — 97530 THERAPEUTIC ACTIVITIES: CPT

## 2024-02-16 NOTE — PROGRESS NOTES
untimed codes, in totals to left)  8-22 min = 1 unit; 23-37 min = 2 units; 38-52 min = 3 units; 53-67 min = 4 units; 68-82 min = 5 units   Total Total       TOTAL TREATMENT TIME:        55       [x]  Patient Education billed concurrently with other procedures   [x] Review HEP    [] Progressed/Changed HEP, detail:    [] Other detail:       Objective Information/Functional Measures/Assessment    Patient provided information on obtaining set of resistance bands for home use and reports she will be purchasing them in the next few days. Provided patient additional instruction in optimal use of home band resistance set.    Patient will continue to benefit from skilled PT / OT services to modify and progress therapeutic interventions, analyze and address functional mobility deficits, analyze and address ROM deficits, analyze and address strength deficits, analyze and address soft tissue restrictions, analyze and cue for proper movement patterns, analyze and modify for postural abnormalities, analyze and address imbalance/dizziness, and instruct in home and community integration to address functional deficits and attain remaining goals.    Progress toward goals / Updated goals:  []  See Progress Note/Recertification    Short Term Goals: To be accomplished in 4 weeks:                 Patient will report compliance with initial/basic HEP at least 1x/day to aid in rehabilitation program.                 Status at IE: Patient instructed in and provided written copy of initial Home Exercise Program.                 Current: pt reports initiating HEP 2/8/24                    Patient will display full right shoulder pain free AROM into flexion, abduction and external rotation to aid in completion of ADLs.                 Status at IE: right shoulder flex 115, abd 61, ER 41                 Current: right shoulder flex 123, abd 84, ER 46   In-progress, 2/16/2024        Long Term Goals: To be accomplished in 8 weeks:

## 2024-02-23 ENCOUNTER — HOSPITAL ENCOUNTER (OUTPATIENT)
Facility: HOSPITAL | Age: 69
Setting detail: RECURRING SERIES
Discharge: HOME | End: 2024-02-26
Payer: MEDICARE

## 2024-02-23 PROCEDURE — 97110 THERAPEUTIC EXERCISES: CPT

## 2024-02-23 PROCEDURE — 97530 THERAPEUTIC ACTIVITIES: CPT

## 2024-02-23 PROCEDURE — 97112 NEUROMUSCULAR REEDUCATION: CPT

## 2024-02-23 NOTE — PROGRESS NOTES
PHYSICAL / OCCUPATIONAL THERAPY - DAILY TREATMENT NOTE (updated )    Patient Name: Nhung Miranda    Date: 2024    : 1955  Insurance: Payor: MEDICARE / Plan: MEDICARE PART A AND B / Product Type: *No Product type* /      Patient  verified Yes     Visit #   Current / Total 5 16   Time   In / Out 1127 1225   Pain   In / Out 0 0   Subjective Functional Status/Changes: \"The shoulder is getting better. I still have pain but only in twinges when I move certain ways.\"   Changes to:  Meds, Allergies, Med Hx, Sx Hx?  If yes, update Summary List no       TREATMENT AREA =  Pain in right shoulder [M25.511]    OBJECTIVE    Therapeutic Procedures:  Tx Min Billable or 1:1 Min (if diff from Tx Min) Procedure, Rationale, Specifics   30 26 18626 Therapeutic Exercise (timed):  increase ROM, strength, coordination, balance, and proprioception to improve patient's ability to progress to PLOF and address remaining functional goals. (see flow sheet as applicable)     Details if applicable:       15  37421 Therapeutic Activity (timed):  use of dynamic activities replicating functional movements to increase ROM, strength, coordination, balance, and proprioception in order to improve patient's ability to progress to PLOF and address remaining functional goals.  (see flow sheet as applicable)     Details if applicable:     13  41083 Neuromuscular Re-Education (timed):  improve balance, coordination, kinesthetic sense, posture, core stability and proprioception to improve patient's ability to develop conscious control of individual muscles and awareness of position of extremities in order to progress to PLOF and address remaining functional goals. (see flow sheet as applicable)     Details if applicable:     58 54 [facility] Totals Reminder: bill using total billable min of TIMED therapeutic procedures (example: do not include dry needle or estim unattended, both untimed codes, in totals to left)  8-22 min = 1 unit; 23-37 min =

## 2024-02-28 ENCOUNTER — HOSPITAL ENCOUNTER (OUTPATIENT)
Facility: HOSPITAL | Age: 69
Setting detail: RECURRING SERIES
Discharge: HOME | End: 2024-03-02
Payer: MEDICARE

## 2024-02-28 PROCEDURE — 97530 THERAPEUTIC ACTIVITIES: CPT

## 2024-02-28 PROCEDURE — 97110 THERAPEUTIC EXERCISES: CPT

## 2024-02-28 PROCEDURE — 97112 NEUROMUSCULAR REEDUCATION: CPT

## 2024-02-28 NOTE — PROGRESS NOTES
PHYSICAL / OCCUPATIONAL THERAPY - DAILY TREATMENT NOTE (updated )    Patient Name: Nhung Miranda    Date: 2024    : 1955  Insurance: Payor: MEDICARE / Plan: MEDICARE PART A AND B / Product Type: *No Product type* /      Patient  verified Yes     Visit #   Current / Total 6 16   Time   In / Out 1130 1214   Pain   In / Out 1 0   Subjective Functional Status/Changes: \"The shoulder is not very painful. It is more sore. But, it still gets aggravated when I use it for simple things, such as sponge mopping the floors.\"   Changes to:  Meds, Allergies, Med Hx, Sx Hx?  If yes, update Summary List no       TREATMENT AREA =  Pain in right shoulder [M25.511]    OBJECTIVE    Therapeutic Procedures:  Tx Min Billable or 1:1 Min (if diff from Tx Min) Procedure, Rationale, Specifics   19 15 69297 Therapeutic Exercise (timed):  increase ROM, strength, coordination, balance, and proprioception to improve patient's ability to progress to PLOF and address remaining functional goals. (see flow sheet as applicable)     Details if applicable:       15  68373 Therapeutic Activity (timed):  use of dynamic activities replicating functional movements to increase ROM, strength, coordination, balance, and proprioception in order to improve patient's ability to progress to PLOF and address remaining functional goals.  (see flow sheet as applicable)     Details if applicable:     10  92475 Neuromuscular Re-Education (timed):  improve balance, coordination, kinesthetic sense, posture, core stability and proprioception to improve patient's ability to develop conscious control of individual muscles and awareness of position of extremities in order to progress to PLOF and address remaining functional goals. (see flow sheet as applicable)     Details if applicable:     44 40 [facility] Totals Reminder: bill using total billable min of TIMED therapeutic procedures (example: do not include dry needle or estim unattended, both untimed

## 2024-03-01 ENCOUNTER — HOSPITAL ENCOUNTER (OUTPATIENT)
Facility: HOSPITAL | Age: 69
Setting detail: RECURRING SERIES
Discharge: HOME | End: 2024-03-04
Payer: MEDICARE

## 2024-03-01 PROCEDURE — 97110 THERAPEUTIC EXERCISES: CPT

## 2024-03-01 PROCEDURE — 97530 THERAPEUTIC ACTIVITIES: CPT

## 2024-03-01 PROCEDURE — 97112 NEUROMUSCULAR REEDUCATION: CPT

## 2024-03-01 NOTE — PROGRESS NOTES
Patient instructed in and provided written copy of initial Home Exercise Program.                 Current: Same as IE                    Patient will report no pain greater than 1-2/10 with overhead activities to aid in completion of ADLs.                 Status at IE: 1-8/10                 Current: 0-4/10               In-progress, 2/23/2024                    Patient will increase right shoulder strength to 5/5 throughout all planes to aid in completion of ADLs.                 Status at IE: right shoulder flex 3+, abd 3+, ER 3+                 Current: Same as IE                    Patient will be able to lift 10 pounds overhead pain free to complete household ADLs.                 Status at IE: 0 pounds due to pain.                 Current: Same as IE                    Patient will increase FOTO (an established functional score where 100 = no disability) score to 61 points overall to demonstrate improvement in functional status.                  Status at IE: 49                 Current: 67            MET, 3/1/2024    PLAN  Yes  Continue plan of care  []  Upgrade activities as tolerated  []  Discharge due to:   []  Other:    Alex Trejo PT    3/1/2024    10:15 AM    Future Appointments   Date Time Provider Department Center   3/5/2024 10:10 AM Alex Trejo, [facility]   3/6/2024 12:10 PM Alex Trejo PT [facility]   3/13/2024  2:10 PM Alex Trejo PT [facility]   3/15/2024 11:30 AM Alex Trejo PT [facility]

## 2024-03-05 ENCOUNTER — HOSPITAL ENCOUNTER (OUTPATIENT)
Facility: HOSPITAL | Age: 69
Setting detail: RECURRING SERIES
Discharge: HOME | End: 2024-03-08
Payer: MEDICARE

## 2024-03-05 PROCEDURE — 97110 THERAPEUTIC EXERCISES: CPT

## 2024-03-05 PROCEDURE — 97530 THERAPEUTIC ACTIVITIES: CPT

## 2024-03-05 PROCEDURE — 97112 NEUROMUSCULAR REEDUCATION: CPT

## 2024-03-05 NOTE — PROGRESS NOTES
PHYSICAL / OCCUPATIONAL THERAPY - DAILY TREATMENT NOTE (updated )    Patient Name: Nhung Miranda    Date: 3/5/2024    : 1955  Insurance: Payor: MEDICARE / Plan: MEDICARE PART A AND B / Product Type: *No Product type* /      Patient  verified Yes     Visit #   Current / Total 8 16   Time   In / Out 1013 1107   Pain   In / Out 0 0   Subjective Functional Status/Changes: \"I am fine right now, but I had a fair amount of pain the other night. I think I unintentionally slept on my right side.\"   Changes to:  Meds, Allergies, Med Hx, Sx Hx?  If yes, update Summary List no       TREATMENT AREA =  Pain in right shoulder [M25.511]    OBJECTIVE    Therapeutic Procedures:  Tx Min Billable or 1:1 Min (if diff from Tx Min) Procedure, Rationale, Specifics   24  43631 Therapeutic Exercise (timed):  increase ROM, strength, coordination, balance, and proprioception to improve patient's ability to progress to PLOF and address remaining functional goals. (see flow sheet as applicable)     Details if applicable:       15  93107 Therapeutic Activity (timed):  use of dynamic activities replicating functional movements to increase ROM, strength, coordination, balance, and proprioception in order to improve patient's ability to progress to PLOF and address remaining functional goals.  (see flow sheet as applicable)     Details if applicable:     15  71418 Neuromuscular Re-Education (timed):  improve balance, coordination, kinesthetic sense, posture, core stability and proprioception to improve patient's ability to develop conscious control of individual muscles and awareness of position of extremities in order to progress to PLOF and address remaining functional goals. (see flow sheet as applicable)     Details if applicable:     54  [facility] Totals Reminder: bill using total billable min of TIMED therapeutic procedures (example: do not include dry needle or estim unattended, both untimed codes, in totals to left)  8-22 min =

## 2024-03-05 NOTE — PROGRESS NOTES
[facility]  [address] [address]                                                                                      Phone: [phone]   Fax: [phone]    Progress Note  Patient name: Nhung Miranda Start of Care: 2024   Referral source: Rosaura Pedraza DO : 1955               Medical Diagnosis: Pain in right shoulder [M25.511]      Onset Date: 10/30/2023               Treatment Diagnosis:  M25.511  RIGHT SHOULDER PAIN                                          Prior Hospitalization: see medical history Provider#: [id number]   Medications: Verified on Patient Summary List      Comorbidities: pre-diabetic, low back and left hip pain. Surgical Hx: lumbar fusion x 3, right TKA  Prior Level of Function: independent in all household ADLs, reports very physically active being on feet most of the day     Visits from Start of Care: 8    Missed Visits: 0    Updated Goals/Measure of Progress: To be achieved in 4 weeks:    Short Term Goals: To be accomplished in 4 weeks:                 Patient will report compliance with initial/basic HEP at least 1x/day to aid in rehabilitation program.                 Status at IE: Patient instructed in and provided written copy of initial Home Exercise Program.                 Current: Steadily advancing intensity of HEP as patient tolerance improves. 3/5/2024                    Patient will display full right shoulder pain free AROM into flexion, abduction and external rotation to aid in completion of ADLs.                 Status at IE: right shoulder flex 115, abd 61, ER 41                 Current: right shoulder flex 140, abd 135, ER 49   In-progress, 3/5/2024        Long Term Goals: To be accomplished in 8 weeks:                 Patient will report compliance with comprehensive HEP a least 3-4x/week to aid in rehabilitation/strengthening program.                 Status at IE: Patient instructed in and provided written

## 2024-03-06 ENCOUNTER — APPOINTMENT (OUTPATIENT)
Facility: HOSPITAL | Age: 69
End: 2024-03-06
Payer: MEDICARE

## 2024-03-13 ENCOUNTER — APPOINTMENT (OUTPATIENT)
Facility: HOSPITAL | Age: 69
End: 2024-03-13
Payer: MEDICARE

## 2024-03-13 ENCOUNTER — TELEPHONE (OUTPATIENT)
Facility: HOSPITAL | Age: 69
End: 2024-03-13
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
## 2024-03-15 ENCOUNTER — HOSPITAL ENCOUNTER (OUTPATIENT)
Facility: HOSPITAL | Age: 69
Setting detail: RECURRING SERIES
Discharge: HOME | End: 2024-03-18
Payer: MEDICARE

## 2024-03-15 PROCEDURE — 97110 THERAPEUTIC EXERCISES: CPT

## 2024-03-15 PROCEDURE — 97112 NEUROMUSCULAR REEDUCATION: CPT

## 2024-03-15 PROCEDURE — 97530 THERAPEUTIC ACTIVITIES: CPT

## 2024-03-15 NOTE — PROGRESS NOTES
PHYSICAL / OCCUPATIONAL THERAPY - DAILY TREATMENT NOTE (updated )    Patient Name: Nhung Miranda    Date: 3/15/2024    : 1955  Insurance: Payor: MEDICARE / Plan: MEDICARE PART A AND B / Product Type: *No Product type* /      Patient  verified Yes     Visit #   Current / Total 9 16   Time   In / Out 1125 1224   Pain   In / Out 0 0   Subjective Functional Status/Changes: \"I have no pain right now, but I still get episodes of pain when I try to be really active.   Changes to:  Meds, Allergies, Med Hx, Sx Hx?  If yes, update Summary List no       TREATMENT AREA =  Pain in right shoulder [M25.511]    OBJECTIVE    Therapeutic Procedures:  Tx Min Billable or 1:1 Min (if diff from Tx Min) Procedure, Rationale, Specifics   29 24 65496 Therapeutic Exercise (timed):  increase ROM, strength, coordination, balance, and proprioception to improve patient's ability to progress to PLOF and address remaining functional goals. (see flow sheet as applicable)     Details if applicable:       15  96510 Therapeutic Activity (timed):  use of dynamic activities replicating functional movements to increase ROM, strength, coordination, balance, and proprioception in order to improve patient's ability to progress to PLOF and address remaining functional goals.  (see flow sheet as applicable)     Details if applicable:     15  51738 Neuromuscular Re-Education (timed):  improve balance, coordination, kinesthetic sense, posture, core stability and proprioception to improve patient's ability to develop conscious control of individual muscles and awareness of position of extremities in order to progress to PLOF and address remaining functional goals. (see flow sheet as applicable)     Details if applicable:     59 54 [facility] Totals Reminder: bill using total billable min of TIMED therapeutic procedures (example: do not include dry needle or estim unattended, both untimed codes, in totals to left)  8-22 min = 1 unit; 23-37 min = 2

## 2024-03-18 ENCOUNTER — TELEPHONE (OUTPATIENT)
Facility: HOSPITAL | Age: 69
End: 2024-03-18

## 2024-03-20 ENCOUNTER — APPOINTMENT (OUTPATIENT)
Facility: HOSPITAL | Age: 69
End: 2024-03-20
Payer: MEDICARE

## 2024-03-22 ENCOUNTER — APPOINTMENT (OUTPATIENT)
Facility: HOSPITAL | Age: 69
End: 2024-03-22
Payer: MEDICARE

## 2024-03-22 ENCOUNTER — TELEPHONE (OUTPATIENT)
Facility: HOSPITAL | Age: 69
End: 2024-03-22

## 2024-03-26 ENCOUNTER — TELEPHONE (OUTPATIENT)
Facility: HOSPITAL | Age: 69
End: 2024-03-26

## 2024-03-26 ENCOUNTER — APPOINTMENT (OUTPATIENT)
Facility: HOSPITAL | Age: 69
End: 2024-03-26
Payer: MEDICARE
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
## 2024-03-28 ENCOUNTER — HOSPITAL ENCOUNTER (OUTPATIENT)
Facility: HOSPITAL | Age: 69
Setting detail: RECURRING SERIES
Discharge: HOME | End: 2024-03-31
Payer: MEDICARE

## 2024-03-28 ENCOUNTER — TELEPHONE (OUTPATIENT)
Facility: HOSPITAL | Age: 69
End: 2024-03-28

## 2024-03-28 PROCEDURE — 97110 THERAPEUTIC EXERCISES: CPT

## 2024-03-28 PROCEDURE — 97112 NEUROMUSCULAR REEDUCATION: CPT

## 2024-03-28 PROCEDURE — 97530 THERAPEUTIC ACTIVITIES: CPT

## 2024-03-28 NOTE — PROGRESS NOTES
PHYSICAL / OCCUPATIONAL THERAPY - DAILY TREATMENT NOTE (updated )    Patient Name: Nhung Miranda    Date: 3/28/2024    : 1955  Insurance: Payor: MEDICARE / Plan: MEDICARE PART A AND B / Product Type: *No Product type* /      Patient  verified Yes     Visit #   Current / Total 10 16   Time   In / Out 0738 0835   Pain   In / Out 0 0   Subjective Functional Status/Changes: \"I have had some episodes of increased    Changes to:  Meds, Allergies, Med Hx, Sx Hx?  If yes, update Summary List no       TREATMENT AREA =  Pain in right shoulder [M25.511]    OBJECTIVE    Therapeutic Procedures:  Tx Min Billable or 1:1 Min (if diff from Tx Min) Procedure, Rationale, Specifics   27 23 99536 Therapeutic Exercise (timed):  increase ROM, strength, coordination, balance, and proprioception to improve patient's ability to progress to PLOF and address remaining functional goals. (see flow sheet as applicable)     Details if applicable:       15  74736 Therapeutic Activity (timed):  use of dynamic activities replicating functional movements to increase ROM, strength, coordination, balance, and proprioception in order to improve patient's ability to progress to PLOF and address remaining functional goals.  (see flow sheet as applicable)     Details if applicable:     15  44252 Neuromuscular Re-Education (timed):  improve balance, coordination, kinesthetic sense, posture, core stability and proprioception to improve patient's ability to develop conscious control of individual muscles and awareness of position of extremities in order to progress to PLOF and address remaining functional goals. (see flow sheet as applicable)     Details if applicable:     57 53 [facility] Totals Reminder: bill using total billable min of TIMED therapeutic procedures (example: do not include dry needle or estim unattended, both untimed codes, in totals to left)  8-22 min = 1 unit; 23-37 min = 2 units; 38-52 min = 3 units; 53-67 min = 4 units; 68-82

## 2024-04-04 ENCOUNTER — HOSPITAL ENCOUNTER (OUTPATIENT)
Facility: HOSPITAL | Age: 69
Setting detail: RECURRING SERIES
Discharge: HOME | End: 2024-04-07
Payer: MEDICARE

## 2024-04-04 PROCEDURE — 97530 THERAPEUTIC ACTIVITIES: CPT

## 2024-04-04 PROCEDURE — 97110 THERAPEUTIC EXERCISES: CPT

## 2024-04-04 NOTE — PROGRESS NOTES
[facility]  [address] [address]                                                                                      Phone: [phone]   Fax: [phone]    Progress Note  Patient name: Nhung Miranda Start of Care: 2024   Referral source: Rosaura Pedraza DO : 1955               Medical Diagnosis: Pain in right shoulder [M25.511]      Onset Date: 10/30/2023               Treatment Diagnosis:  M25.511  RIGHT SHOULDER PAIN                                          Prior Hospitalization: see medical history Provider#: [id number]   Medications: Verified on Patient Summary List       Visits from Start of Care: 11    Missed Visits: 5    Updated Goals/Measure of Progress: Short Term Goals: To be accomplished in 4 weeks:                 Patient will report compliance with initial/basic HEP at least 1x/day to aid in rehabilitation program.                 Status at IE: Patient instructed in and provided written copy of initial Home Exercise Program.                 Current: Steadily virtually independent with comprehensive HEP and reports moderate consistency with HEP.           MET, 3/15/2024                    Patient will display full right shoulder pain free AROM into flexion, abduction and external rotation to aid in completion of ADLs.                 Status at IE: right shoulder flex 115, abd 61, ER 41                 Current: right shoulder WNL all motions,  MET 24        Long Term Goals: To be accomplished in 8 weeks:                 Patient will report compliance with comprehensive HEP a least 3-4x/week to aid in rehabilitation/strengthening program.                 Status at IE: Patient instructed in and provided written copy of initial Home Exercise Program.                 Current: Steadily advancing intensity of HEP as patient tolerance improves. 24                    Patient will report no pain greater than 1-2/10 with overhead 
presents s/p fall forward onto right elbow 10/30/2023 with signs/symtoms of subacute traumatic right bicipital tenosynovitis and rotator cuff tendonitis. Patient currently exhibits marked decrease in right shoulder AROM and strength with mild to moderate pain and moderate difficulty with household ADLs.     Patient has completed 11 Physical Therapy visits for 11. Patient reports significant improvement in symptoms. Patient demonstrates improvement in achieving fully shoulder ROM, decreased pain during activity, and improved UE strength. Patient continues to demonstrate deficits in shoulder pain when lifting weight greater than 2# overhead, pain with heavier lifting, weakness to tolerate functional activity, and pain with sleeping.  Patient is making progress towards goals. Patient will benefit from continued skilled Physical Therapy intervention to address remaining deficits and achieve goals to maximize function.      Patient will continue to benefit from skilled PT / OT services to modify and progress therapeutic interventions, analyze and address functional mobility deficits, analyze and address ROM deficits, analyze and address strength deficits, analyze and address soft tissue restrictions, analyze and cue for proper movement patterns, analyze and modify for postural abnormalities, analyze and address imbalance/dizziness, and instruct in home and community integration to address functional deficits and attain remaining goals.     Progress toward goals / Updated goals:  []  See Progress Note/Recertification     Short Term Goals: To be accomplished in 4 weeks:                 Patient will report compliance with initial/basic HEP at least 1x/day to aid in rehabilitation program.                 Status at IE: Patient instructed in and provided written copy of initial Home Exercise Program.                 Current: Steadily virtually independent with comprehensive HEP and reports moderate consistency with HEP.

## 2024-04-16 ENCOUNTER — HOSPITAL ENCOUNTER (OUTPATIENT)
Facility: HOSPITAL | Age: 69
Setting detail: RECURRING SERIES
Discharge: HOME | End: 2024-04-19
Payer: MEDICARE

## 2024-04-16 PROCEDURE — 97112 NEUROMUSCULAR REEDUCATION: CPT

## 2024-04-16 PROCEDURE — 97110 THERAPEUTIC EXERCISES: CPT

## 2024-04-16 PROCEDURE — 97530 THERAPEUTIC ACTIVITIES: CPT

## 2024-04-16 NOTE — PROGRESS NOTES
PHYSICAL / OCCUPATIONAL THERAPY - DAILY TREATMENT NOTE (updated )    Patient Name: Nhung Miranda    Date: 2024    : 1955  Insurance: Payor: MEDICARE / Plan: MEDICARE PART A AND B / Product Type: *No Product type* /      Patient  verified Yes     Visit #   Current / Total 12 16   Time   In / Out 1050 1142   Pain   In / Out 0 0   Subjective Functional Status/Changes: \"I got an injection in the shoulder on 24 and it seems to be feeling better since then.\"   Changes to:  Meds, Allergies, Med Hx, Sx Hx?  If yes, update Summary List no       TREATMENT AREA =  Pain in right shoulder [M25.511]    OBJECTIVE    Therapeutic Procedures:  Tx Min Billable or 1:1 Min (if diff from Tx Min) Procedure, Rationale, Specifics   22 15 44050 Therapeutic Exercise (timed):  increase ROM, strength, coordination, balance, and proprioception to improve patient's ability to progress to PLOF and address remaining functional goals. (see flow sheet as applicable)     Details if applicable:       15  08186 Therapeutic Activity (timed):  use of dynamic activities replicating functional movements to increase ROM, strength, coordination, balance, and proprioception in order to improve patient's ability to progress to PLOF and address remaining functional goals.  (see flow sheet as applicable)     Details if applicable:     15  16156 Neuromuscular Re-Education (timed):  improve balance, coordination, kinesthetic sense, posture, core stability and proprioception to improve patient's ability to develop conscious control of individual muscles and awareness of position of extremities in order to progress to PLOF and address remaining functional goals. (see flow sheet as applicable)     Details if applicable:     52 47 [facility] Totals Reminder: bill using total billable min of TIMED therapeutic procedures (example: do not include dry needle or estim unattended, both untimed codes, in totals to left)  8-22 min = 1 unit; 23-37 min = 2

## 2024-04-18 ENCOUNTER — HOSPITAL ENCOUNTER (OUTPATIENT)
Facility: HOSPITAL | Age: 69
Setting detail: RECURRING SERIES
Discharge: HOME | End: 2024-04-21
Payer: MEDICARE

## 2024-04-18 PROCEDURE — 97530 THERAPEUTIC ACTIVITIES: CPT

## 2024-04-18 PROCEDURE — 97112 NEUROMUSCULAR REEDUCATION: CPT

## 2024-04-18 PROCEDURE — 97110 THERAPEUTIC EXERCISES: CPT

## 2024-04-18 NOTE — PROGRESS NOTES
report compliance with comprehensive HEP a least 3-4x/week to aid in rehabilitation/strengthening program.                 Status at IE: Patient instructed in and provided written copy of initial Home Exercise Program.                 Current: Steadily advancing intensity of HEP as patient tolerance improves. 4/4/24                    Patient will report no pain greater than 1-2/10 with overhead activities to aid in completion of ADLs.                 Status at IE: 1-8/10                 Current: 0-2/10               MET, 2/23/2024                    Patient will increase right shoulder strength to 5/5 throughout all planes to aid in completion of ADLs.                 Status at IE: right shoulder flex 3+, abd 3+, ER 3+                 Current: right shoulder flex: 4, abd 4, ER: 3+   In-progress, 4/16/2024                    Patient will be able to lift 10 pounds overhead pain free to complete household ADLs.                 Status at IE: 0 pounds due to pain.                 Current:  2 pounds, pain with 3#         In-progress, 4/4/2024                    Patient will increase FOTO (an established functional score where 100 = no disability) score to 61 points overall to demonstrate improvement in functional status.                  Status at IE: 49                 Current: 67            MET, 3/1/2024    PLAN  Yes  Continue plan of care  []  Upgrade activities as tolerated  []  Discharge due to:   []  Other:    Alex Trejo PT    4/18/2024    8:17 AM    Future Appointments   Date Time Provider Department Center   4/23/2024  8:10 AM Alex Trejo PT [email or web address]   4/25/2024  8:10 AM Alex Trejo PT [email or web address]   4/30/2024 11:30 AM Alex Trejo PT [email or web address]

## 2024-04-23 ENCOUNTER — HOSPITAL ENCOUNTER (OUTPATIENT)
Facility: HOSPITAL | Age: 69
Setting detail: RECURRING SERIES
Discharge: HOME | End: 2024-04-26
Payer: MEDICARE

## 2024-04-23 PROCEDURE — 97112 NEUROMUSCULAR REEDUCATION: CPT

## 2024-04-23 PROCEDURE — 97530 THERAPEUTIC ACTIVITIES: CPT

## 2024-04-23 PROCEDURE — 97110 THERAPEUTIC EXERCISES: CPT

## 2024-04-23 NOTE — PROGRESS NOTES
PHYSICAL / OCCUPATIONAL THERAPY - DAILY TREATMENT NOTE (updated )    Patient Name: Nhung Miranda    Date: 2024    : 1955  Insurance: Payor: MEDICARE / Plan: MEDICARE PART A AND B / Product Type: *No Product type* /      Patient  verified Yes     Visit #   Current / Total 14 16   Time   In / Out 0810 0905   Pain   In / Out 0 0   Subjective Functional Status/Changes: \"The shoulder has been feeling pretty good recently.\"   Changes to:  Meds, Allergies, Med Hx, Sx Hx?  If yes, update Summary List no       TREATMENT AREA =  Pain in right shoulder [M25.511]    OBJECTIVE    Therapeutic Procedures:  Tx Min Billable or 1:1 Min (if diff from Tx Min) Procedure, Rationale, Specifics   25  73205 Therapeutic Exercise (timed):  increase ROM, strength, coordination, balance, and proprioception to improve patient's ability to progress to PLOF and address remaining functional goals. (see flow sheet as applicable)     Details if applicable:       15  20005 Therapeutic Activity (timed):  use of dynamic activities replicating functional movements to increase ROM, strength, coordination, balance, and proprioception in order to improve patient's ability to progress to PLOF and address remaining functional goals.  (see flow sheet as applicable)     Details if applicable:     15  23065 Neuromuscular Re-Education (timed):  improve balance, coordination, kinesthetic sense, posture, core stability and proprioception to improve patient's ability to develop conscious control of individual muscles and awareness of position of extremities in order to progress to PLOF and address remaining functional goals. (see flow sheet as applicable)     Details if applicable:     55 53 [facility] Totals Reminder: bill using total billable min of TIMED therapeutic procedures (example: do not include dry needle or estim unattended, both untimed codes, in totals to left)  8-22 min = 1 unit; 23-37 min = 2 units; 38-52 min = 3 units; 53-67 min =

## 2024-04-25 ENCOUNTER — HOSPITAL ENCOUNTER (OUTPATIENT)
Facility: HOSPITAL | Age: 69
Setting detail: RECURRING SERIES
Discharge: HOME | End: 2024-04-28
Payer: MEDICARE

## 2024-04-25 PROCEDURE — 97110 THERAPEUTIC EXERCISES: CPT

## 2024-04-25 PROCEDURE — 97112 NEUROMUSCULAR REEDUCATION: CPT

## 2024-04-25 PROCEDURE — 97530 THERAPEUTIC ACTIVITIES: CPT

## 2024-04-25 NOTE — PROGRESS NOTES
Physical Therapy Discharge Instructions    [facility]  [address] [address]  Phone: [phone]   Fax: [phone]      Patient: Nhung Miranda  : 1955    Continue Home Exercise Program 2 times per day for 4 weeks, then decrease to 4 times per week      Continue with    [x] Ice  as needed      [x] Heat           Follow up with MD:     [] Upon completion of therapy     [x] As needed      Recommendations:     [x]   Return to activity with home program    []   Return to activity with the following modifications:       []Post Rehab Program    []Join Independent aquatic program     []Return to/join local gym      Additional Comments: Please contact clinic at above phone number if you have any questions regarding Home Exercise Program or self care instructions.    
min = 2 units; 38-52 min = 3 units; 53-67 min = 4 units; 68-82 min = 5 units   Total Total       TOTAL TREATMENT TIME:        59       [x]  Patient Education billed concurrently with other procedures   [x] Review HEP    [] Progressed/Changed HEP, detail:    [] Other detail:       Objective Information/Functional Measures/Assessment    Patient has been well motivated, consistent and diligent with PT and HEP and as a result has made excellent progress towards goals. Right shoulder AROM is now full and  functional strength is nearing normal. Pain is virtually resolved and patient is now able to perform all usual ADLs without any recurrence of pain symptoms. Only recurrences of pain happen when patient performs higher level repetitive tasks such as heavy duty cleaning at home. Patient is now ready to discontinue PT and transition to independent self care.    Progress toward goals / Updated goals:  [x]  See Discharge Summary    Short Term Goals: To be accomplished in 4 weeks:                Patient will report compliance with initial/basic HEP at least 1x/day to aid in rehabilitation program.                 Status at IE: Patient instructed in and provided written copy of initial Home Exercise Program.                 Current: Patient now fully independent and consistent with comprehensive HEP.           MET, 4/25/2024                    Patient will display full right shoulder pain free AROM into flexion, abduction and external rotation to aid in completion of ADLs.                 Status at IE: right shoulder flex 115, abd 61, ER 41                 Current: right shoulder WNL all motions,  MET 4/4/24        Long Term Goals: To be accomplished in 8 weeks:                 Patient will report compliance with comprehensive HEP a least 3-4x/week to aid in rehabilitation/strengthening program.                 Status at IE: Patient instructed in and provided written copy of initial Home Exercise Program.                 
now fully independent and consistent with comprehensive HEP.           MET, 4/25/2024                    Patient will report no pain greater than 1-2/10 with overhead activities to aid in completion of ADLs.                 Status at IE: 1-8/10                 Current: 0-2/10               MET, 2/23/2024                    Patient will increase right shoulder strength to 5/5 throughout all planes to aid in completion of ADLs.                 Status at IE: right shoulder flex 3+, abd 3+, ER 3+                 Current: right shoulder flex: 4+, abd 4+, ER: 4   In-progress, 4/25/2024                    Patient will be able to lift 10 pounds overhead pain free to complete household ADLs.                 Status at IE: 0 pounds due to pain.                 Current:  3 pounds with difficulty         In-progress, 4/23/2024                    Patient will increase FOTO (an established functional score where 100 = no disability) score to 61 points overall to demonstrate improvement in functional status.                  Status at IE: 49                 Current: 72            MET, 4/25/2024    Assessment/ Summary of Care:   Patient has been well motivated, consistent and diligent with PT and HEP and as a result has made excellent progress towards goals. Right shoulder AROM is now full and functional strength is nearing normal. Pain is virtually resolved and patient is now able to perform all usual ADLs without any recurrence of pain symptoms. Only recurrences of pain happen when patient performs higher level repetitive tasks such as heavy duty cleaning at home. Patient is now ready to discontinue PT and transition to independent self care.       RECOMMENDATIONS:  [x]Discontinue therapy: [x]Patient has reached or is progressing toward set goals      []Patient is non-compliant or has abdicated      []Due to lack of appreciable progress towards set goals    Alex Trejo, PT 4/25/2024 8:37 AM

## 2024-04-30 ENCOUNTER — APPOINTMENT (OUTPATIENT)
Facility: HOSPITAL | Age: 69
End: 2024-04-30
Payer: MEDICARE

## 2024-08-02 ENCOUNTER — TELEPHONE (OUTPATIENT)
Facility: HOSPITAL | Age: 69
End: 2024-08-02

## 2024-08-06 ENCOUNTER — HOSPITAL ENCOUNTER (OUTPATIENT)
Facility: HOSPITAL | Age: 69
Setting detail: RECURRING SERIES
Discharge: HOME | End: 2024-08-09
Payer: MEDICARE

## 2024-08-06 PROCEDURE — 97110 THERAPEUTIC EXERCISES: CPT

## 2024-08-06 PROCEDURE — 97161 PT EVAL LOW COMPLEX 20 MIN: CPT

## 2024-08-06 NOTE — PROGRESS NOTES
Exercise:  [x] See flow sheet :   Rationale: increase ROM, increase strength and decrease pain to improve the patient’s ability to complete ADLs            With   [] TE   [] TA   [] neuro   [] other: Patient Education: [x] Review HEP    [] Progressed/Changed HEP based on:   [] positioning   [] body mechanics   [] transfers   [] heat/ice application    [] other:        Physical Therapy Evaluation - Shoulder    Posture: [] Poor    [x] Fair    [] Good    Describe:    ROM:  [] Unable to assess at this time                                           PROM                                                              Strength   Left Right  Left Right   Flexion 158 103 Flexion 5 2   Extension 58 23 Extension 5 2   Scaption/ 58 Scaption/ABD 5 2   ER @ 0 Degrees 68 23 ER  5 2   IR @ 60 Degrees 61 41 IR 5 2   Apley IR T5 NT        End Feel / Painful Arc:    Scapulohumoral Control / Rhythm:  Able to eccentrically lower with good control? Left: [] Yes   [] No     Right: [] Yes   [x] No    Accessory Motions:    Palpation  [] Min  [x] Mod  [] Severe    Location: anterior right shoulder  [] Min  [] Mod  [] Severe    Location:    Optional Tests:  Deferred due to recent surgery  Sensation Left Right Reflexes Left Right   Biceps (C5)   Biceps (C5)     Shields Radial(C6-7)   Brachioradialis (C6)     Shields Ulnar(C8-T1)   Triceps (C7)       Adson's Test   [] Pos   [] Neg Yergason's (THL) Test [] Pos   [] Neg  Tracey's Test   [] Pos   [] Neg [place]aan (labrum/AC)  [] Pos   [] Neg  Neer's Test   [] Pos   [] Neg Clunk Test (SLAP)  [] Pos   [] Neg  Hawkin's (Supra/Imping)Test [] Pos   [] Neg AC Joint   [] Pos   [] Neg  Speed's (SLAP/biceps)Test [] Pos   [] Neg SC Joint   [] Pos   [] Neg  Empty Can (Suprasp)  [] Pos   [] Neg Pectoral Tightness  [] Pos   [] Neg  Anterior Apprehension [] Pos   [] Neg   Posterior Apprehension [] Pos   [] Neg      Other Tests / Comments:        Pain Level (0-10 scale) post treatment: 6    ASSESSMENT/Changes in

## 2024-08-06 NOTE — PROGRESS NOTES
;Presentation LOW Complexity : Stable, uncomplicated  ;Clinical Decision Making Other outcome measures Quick DASH 70.5%  HIGH  FOTO score = an established functional score where 100 = no disability  Overall Complexity Rating: LOW   Problem List: pain affecting function, decrease ROM, decrease strength, decrease ADL/functional abilities, decrease activity tolerance, and decrease flexibility/joint mobility   Treatment Plan may include any combination of the followin Therapeutic Exercise, 18428 Neuromuscular Re-Education, 53202 Manual Therapy, 88333 Therapeutic Activity, 02280 Self Care/Home Management, and 52918 Vasopneumatic Device  (Vasopnuematic compression justification:  Per bilateral girth measures taken and listed above the edema is considered significant and having an impact on the patient's strength, self care, and ADL's)  Vasopnuematic compression justification:  Per bilateral girth measures taken and listed above the edema is considered significant and having an impact on the patient's strength, self care, and ADLs  Patient / Family readiness to learn indicated by: asking questions, trying to perform skills, interest, return verbalization , and return demonstration   Persons(s) to be included in education: patient (P)  Barriers to Learning/Limitations: None  Measures taken if barriers to learning present: NA  Patient Goal (s): “restore full use of my right shoulder”  Patient Self Reported Health Status: good  Rehabilitation Potential: good    Short Term Goals: To be accomplished in 6 weeks:   Patient will report compliance with initial/basic HEP at least 1x/day to aid in rehabilitation program.   Status at IE: Patient instructed in and provided written copy of initial Home Exercise Program.   Current: Same as IE     Patient will display full right shoulder pain free AROM flex to 150, ext to 58, abd to 160, ER to 68 to aid in completion of ADLs.   Status at IE: PROM flex 103, ext 23, abd 58, ER

## 2024-08-07 ENCOUNTER — HOSPITAL ENCOUNTER (OUTPATIENT)
Facility: HOSPITAL | Age: 69
Setting detail: RECURRING SERIES
Discharge: HOME | End: 2024-08-10
Payer: MEDICARE

## 2024-08-07 PROCEDURE — 97110 THERAPEUTIC EXERCISES: CPT

## 2024-08-07 PROCEDURE — 97016 VASOPNEUMATIC DEVICE THERAPY: CPT

## 2024-08-07 PROCEDURE — 97140 MANUAL THERAPY 1/> REGIONS: CPT

## 2024-08-07 NOTE — PROGRESS NOTES
PHYSICAL / OCCUPATIONAL THERAPY - DAILY TREATMENT NOTE (updated )    Patient Name: Nhung Miranda    Date: 2024    : 1955  Insurance: Payor: MEDICARE / Plan: MEDICARE PART A AND B / Product Type: *No Product type* /      Patient  verified Yes     Visit #   Current / Total 2 24   Time   In / Out 1650 1738   Pain   In / Out 3 1   Subjective Functional Status/Changes: \"Pain is not too bad at rest, but if I jar it in anyway the pain gets really sharp and intense.\"   Changes to:  Meds, Allergies, Med Hx, Sx Hx?  If yes, update Summary List no       TREATMENT AREA =  Pain in right shoulder [M25.511]    If an interpreting service is utilized for treatment of this patient, the contents of this document represent the material reviewed with the patient via the .     OBJECTIVE  Modalities Rationale:     decrease edema and decrease pain to improve patient's ability to Complete ADLS  10 min [x]  Vasopneumatic Device, press/temp: Medium, low      If using vaso (need to measure limb vaso being performed on)      pre-treatment girth : 44.0 cm.      post-treatment girth : 43.1 cm.      measured at (landmark location): ac joint       min []  Other:    [x] Skin assessment post-treatment (if applicable):    [x]  intact    []  redness- no adverse reaction     []redness - adverse reaction:        Therapeutic Procedures:  Tx Min Billable or 1:1 Min (if diff from Tx Min) Procedure, Rationale, Specifics   52 [id number] Therapeutic Exercise (timed):  increase ROM, strength, coordination, balance, and proprioception to improve patient's ability to progress to PLOF and address remaining functional goals. (see flow sheet as applicable)     Details if applicable:       51 10631 Manual Therapy (timed):  decrease pain, increase ROM, increase tissue extensibility, decrease edema, and increase postural awareness to improve patient's ability to progress to PLOF and address remaining functional goals.  The manual therapy

## 2024-08-13 ENCOUNTER — HOSPITAL ENCOUNTER (OUTPATIENT)
Facility: HOSPITAL | Age: 69
Setting detail: RECURRING SERIES
Discharge: HOME | End: 2024-08-16
Payer: MEDICARE

## 2024-08-13 PROCEDURE — 97016 VASOPNEUMATIC DEVICE THERAPY: CPT

## 2024-08-13 PROCEDURE — 97110 THERAPEUTIC EXERCISES: CPT

## 2024-08-13 PROCEDURE — 97140 MANUAL THERAPY 1/> REGIONS: CPT

## 2024-08-13 NOTE — PROGRESS NOTES
PHYSICAL / OCCUPATIONAL THERAPY - DAILY TREATMENT NOTE (updated )    Patient Name: Nhung Miranda    Date: 2024    : 1955  Insurance: Payor: MEDICARE / Plan: MEDICARE PART A AND B / Product Type: *No Product type* /      Patient  verified Yes     Visit #   Current / Total 3 24   Time   In / Out 1613 1708   Pain   In / Out 1 1   Subjective Functional Status/Changes: \"I'm doing my exercises at home.\"   Changes to:  Meds, Allergies, Med Hx, Sx Hx?  If yes, update Summary List no       TREATMENT AREA =  Pain in right shoulder [M25.511]    If an interpreting service is utilized for treatment of this patient, the contents of this document represent the material reviewed with the patient via the .     OBJECTIVE  Modalities Rationale:     decrease edema and decrease pain to improve patient's ability to Complete ADLS  10 min [x]  Vasopneumatic Device, press/temp: Medium, low      If using vaso (need to measure limb vaso being performed on)      pre-treatment girth : 44.0 cm.      post-treatment girth : 43.1 cm.      measured at (landmark location): ac joint       min []  Other:    [x] Skin assessment post-treatment (if applicable):    [x]  intact    []  redness- no adverse reaction     []redness - adverse reaction:        Therapeutic Procedures:  Tx Min Billable or 1:1 Min (if diff from Tx Min) Procedure, Rationale, Specifics   30  09086 Therapeutic Exercise (timed):  increase ROM, strength, coordination, balance, and proprioception to improve patient's ability to progress to PLOF and address remaining functional goals. (see flow sheet as applicable)     Details if applicable:       17  73567 Manual Therapy (timed):  decrease pain, increase ROM, increase tissue extensibility, decrease edema, and increase postural awareness to improve patient's ability to progress to PLOF and address remaining functional goals.  The manual therapy interventions were performed at a separate and distinct time

## 2024-08-14 ENCOUNTER — HOSPITAL ENCOUNTER (OUTPATIENT)
Facility: HOSPITAL | Age: 69
Setting detail: RECURRING SERIES
Discharge: HOME | End: 2024-08-17
Payer: MEDICARE

## 2024-08-14 PROCEDURE — 97016 VASOPNEUMATIC DEVICE THERAPY: CPT

## 2024-08-14 PROCEDURE — 97140 MANUAL THERAPY 1/> REGIONS: CPT

## 2024-08-14 PROCEDURE — 97110 THERAPEUTIC EXERCISES: CPT

## 2024-08-14 NOTE — PROGRESS NOTES
PHYSICAL / OCCUPATIONAL THERAPY - DAILY TREATMENT NOTE (updated )    Patient Name: Nhung Miranda    Date: 2024    : 1955  Insurance: Payor: MEDICARE / Plan: MEDICARE PART A AND B / Product Type: *No Product type* /      Patient  verified Yes     Visit #   Current / Total 4 24   Time   In / Out 1054 1152   Pain   In / Out 0 1   Subjective Functional Status/Changes: \"The pain is coming down and I am getting more comfortable most of the time now.\"   Changes to:  Meds, Allergies, Med Hx, Sx Hx?  If yes, update Summary List no       TREATMENT AREA =  Pain in right shoulder [M25.511]    If an interpreting service is utilized for treatment of this patient, the contents of this document represent the material reviewed with the patient via the .     OBJECTIVE  Modalities Rationale:     decrease edema and decrease pain to improve patient's ability to Complete ADLS         10 min [x]  Vasopneumatic Device, press/temp: Medium, low         If using vaso (need to measure limb vaso being performed on)      pre-treatment girth : 43.9 cm.      post-treatment girth : 43.1 cm.      measured at (landmark location): ac joint        min []  Other:     [x] Skin assessment post-treatment (if applicable):    [x]  intact    []  redness- no adverse reaction     []redness - adverse reaction:      Therapeutic Procedures:  Tx Min Billable or 1:1 Min (if diff from Tx Min) Procedure, Rationale, Specifics   33 [id number] Therapeutic Exercise (timed):  increase ROM, strength, coordination, balance, and proprioception to improve patient's ability to progress to PLOF and address remaining functional goals. (see flow sheet as applicable)     Details if applicable:       69 57176 Manual Therapy (timed):  decrease pain, increase ROM, increase tissue extensibility, and decrease edema to improve patient's ability to progress to PLOF and address remaining functional goals.  The manual therapy interventions were performed at a

## 2024-08-19 ENCOUNTER — HOSPITAL ENCOUNTER (OUTPATIENT)
Facility: HOSPITAL | Age: 69
Setting detail: RECURRING SERIES
Discharge: HOME | End: 2024-08-22
Payer: MEDICARE

## 2024-08-19 PROCEDURE — 97016 VASOPNEUMATIC DEVICE THERAPY: CPT

## 2024-08-19 PROCEDURE — 97110 THERAPEUTIC EXERCISES: CPT

## 2024-08-19 PROCEDURE — 97140 MANUAL THERAPY 1/> REGIONS: CPT

## 2024-08-21 ENCOUNTER — TELEPHONE (OUTPATIENT)
Facility: HOSPITAL | Age: 69
End: 2024-08-21

## 2024-08-21 ENCOUNTER — HOSPITAL ENCOUNTER (OUTPATIENT)
Facility: HOSPITAL | Age: 69
Setting detail: RECURRING SERIES
Discharge: HOME | End: 2024-08-24
Payer: MEDICARE

## 2024-08-21 PROCEDURE — 97110 THERAPEUTIC EXERCISES: CPT

## 2024-08-21 PROCEDURE — 97140 MANUAL THERAPY 1/> REGIONS: CPT

## 2024-08-21 PROCEDURE — 97016 VASOPNEUMATIC DEVICE THERAPY: CPT

## 2024-08-21 NOTE — TELEPHONE ENCOUNTER
Pt called to inform she hasn't left house yet & needs to drop off grandkids. It only takes 10 min to get to clinic & we assured that she can still be seen.

## 2024-08-21 NOTE — PROGRESS NOTES
performed at a separate and distinct time from the therapeutic activities interventions . (see flow sheet as applicable)      Details if applicable: PROM left shoulder supine with gentle joint oscillations Grades I-II.     43  [facility] Totals Reminder: bill using total billable min of TIMED therapeutic procedures (example: do not include dry needle or estim unattended, both untimed codes, in totals to left)  8-22 min = 1 unit; 23-37 min = 2 units; 38-52 min = 3 units; 53-67 min = 4 units; 68-82 min = 5 units   Total Total       TOTAL TREATMENT TIME:        53       [x]  Patient Education billed concurrently with other procedures   [x] Review HEP    [] Progressed/Changed HEP, detail:    [] Other detail:       Objective Information/Functional Measures/Assessment    Patient reporting improved compliance with post op precautions as reviewed at last treatment session and associated decrease in pain intensity. Patient now approaching 3 weeks post op and is ready to progress to light AAROM exercises with assist of cane. Patient instructed in new exercises to incorporate into HEP and provided written copy of updates to HEP.    Patient will continue to benefit from skilled PT / OT services to modify and progress therapeutic interventions, analyze and address functional mobility deficits, analyze and address ROM deficits, analyze and address strength deficits, analyze and address soft tissue restrictions, analyze and cue for proper movement patterns, analyze and modify for postural abnormalities, analyze and address imbalance/dizziness, and instruct in home and community integration to address functional deficits and attain remaining goals.    Progress toward goals / Updated goals:  []  See Progress Note/Recertification    Short Term Goals: To be accomplished in 6 weeks:                 Patient will report compliance with initial/basic HEP at least 1x/day to aid in rehabilitation program.                 Status at IE: Patient

## 2024-08-26 ENCOUNTER — HOSPITAL ENCOUNTER (OUTPATIENT)
Facility: HOSPITAL | Age: 69
Setting detail: RECURRING SERIES
Discharge: HOME | End: 2024-08-29
Payer: MEDICARE

## 2024-08-26 PROCEDURE — 97016 VASOPNEUMATIC DEVICE THERAPY: CPT

## 2024-08-26 PROCEDURE — 97140 MANUAL THERAPY 1/> REGIONS: CPT

## 2024-08-26 PROCEDURE — 97110 THERAPEUTIC EXERCISES: CPT

## 2024-08-26 NOTE — PROGRESS NOTES
PHYSICAL / OCCUPATIONAL THERAPY - DAILY TREATMENT NOTE (updated )    Patient Name: Nhung Miranda    Date: 2024    : 1955  Insurance: Payor: MEDICARE / Plan: MEDICARE PART A AND B / Product Type: *No Product type* /      Patient  verified Yes     Visit #   Current / Total 7 24   Time   In / Out 1008 1103   Pain   In / Out 0 0   Subjective Functional Status/Changes: \"I have no new complaints. I just keep trying to remind myself not to reach for things.\"   Changes to:  Meds, Allergies, Med Hx, Sx Hx?  If yes, update Summary List no       TREATMENT AREA =  Pain in right shoulder [M25.511]    If an interpreting service is utilized for treatment of this patient, the contents of this document represent the material reviewed with the patient via the .     OBJECTIVE  Modalities Rationale:     decrease edema and decrease pain to improve patient's ability to Complete ADLS              10 min [x]  Vasopneumatic Device, press/temp: Medium, low         If using vaso (need to measure limb vaso being performed on)      pre-treatment girth : 43.4 cm.      post-treatment girth : 43.2 cm.      measured at (landmark location): ac joint        min []  Other:     [x] Skin assessment post-treatment (if applicable):    [x]  intact    []  redness- no adverse reaction     []redness - adverse reaction:      Therapeutic Procedures:  Tx Min Billable or 1:1 Min (if diff from Tx Min) Procedure, Rationale, Specifics   30  59034 Therapeutic Exercise (timed):  increase ROM, strength, coordination, balance, and proprioception to improve patient's ability to progress to PLOF and address remaining functional goals. (see flow sheet as applicable)     Details if applicable:       15 15 59088 Manual Therapy (timed):  decrease pain, increase ROM, increase tissue extensibility, and decrease edema to improve patient's ability to progress to PLOF and address remaining functional goals.  The manual therapy interventions were  initial HEP.      In-progress, 8/7/2024                    Patient will display full right shoulder pain free AROM flex to 150, ext to 58, abd to 160, ER to 68 to aid in completion of ADLs.                 Status at IE: PROM flex 103, ext 23, abd 58, ER 23                 Current: PROM flex 125, ext 37, abd 102, ER 44     In-progress, 8/14/2024     Long Term Goals: To be accomplished in 12 weeks:                 Patient will report compliance with comprehensive HEP a least 3-4x/week to aid in rehabilitation/strengthening program.                 Status at IE: Patient instructed in and provided written copy of initial Home Exercise Program.                 Current: Same as IE                    Patient will report no pain greater than 1-2/10 with overhead activities to aid in completion of ADLs.                 Status at IE: 6-10/10                 Current: 1-4/10    In-progress, 8/21/2024                    Patient will increase right UE strength to 5/5 throughout all planes to aid in completion of ADLs.                 Status at IE: right shoulder 2/5                 Current: Same as IE                    Patient will increase QuickDASH: Disability Arm, Shoulder, Hand score by MCID of 19% to demonstrate improvement in functional status.                  Status at IE: 70.5%                 Current: Same as IE    PLAN  Yes  Continue plan of care  []  Upgrade activities as tolerated  []  Discharge due to:   []  Other:    Carl Altman, PT    8/26/2024    10:51 AM    Future Appointments   Date Time Provider Department Center   8/28/2024 10:50 AM Alex Trejo, PT [facility]

## 2024-08-28 ENCOUNTER — HOSPITAL ENCOUNTER (OUTPATIENT)
Facility: HOSPITAL | Age: 69
Setting detail: RECURRING SERIES
Discharge: HOME | End: 2024-08-31
Payer: MEDICARE

## 2024-08-28 PROCEDURE — 97016 VASOPNEUMATIC DEVICE THERAPY: CPT

## 2024-08-28 PROCEDURE — 97140 MANUAL THERAPY 1/> REGIONS: CPT

## 2024-08-28 PROCEDURE — 97110 THERAPEUTIC EXERCISES: CPT

## 2024-08-28 NOTE — PROGRESS NOTES
[facility]  [address] [address]                                                                                      Phone: [phone]   Fax: [phone]    Progress Note  Patient name: Nhung Miranda Start of Care: 2024   Referral source: Leeroy Ibrahim MD : 1955               Medical Diagnosis: Pain in right shoulder [M25.511]      Onset Date: 2024               Treatment Diagnosis:  M25.511  RIGHT SHOULDER PAIN                                          Prior Hospitalization: see medical history Provider#: [id number]   Medications: Verified on Patient Summary List      Comorbidities: pre-diabetic, low back and left hip pain. Surgical Hx: lumbar fusion x 3.   Prior Level of Function: independent in all household ADLs, reports very physically active being on feet most of the day     Visits from Start of Care: 8    Missed Visits: 0    Updated Goals/Measure of Progress: To be achieved in 8 weeks:    Short Term Goals: To be accomplished in 6 weeks:                 Patient will report compliance with initial/basic HEP at least 1x/day to aid in rehabilitation program.                 Status at IE: Patient instructed in and provided written copy of initial Home Exercise Program.                 Current: Patient reports good early compliance with initial HEP.      In-progress, 2024                    Patient will display full right shoulder pain free AROM flex to 150, ext to 58, abd to 160, ER to 68 to aid in completion of ADLs.                 Status at IE: PROM flex 103, ext 23, abd 58, ER 23                 Current: PROM flex 134, ext 37, abd 116, ER 49     In-progress, 2024     Long Term Goals: To be accomplished in 12 weeks:                 Patient will report compliance with comprehensive HEP a least 3-4x/week to aid in rehabilitation/strengthening program.                 Status at IE: Patient instructed in and provided written copy of  initial Home Exercise Program.                 Current: Same as IE                    Patient will report no pain greater than 1-2/10 with overhead activities to aid in completion of ADLs.                 Status at IE: 6-10/10                 Current: 1-4/10             In-progress, 8/21/2024                    Patient will increase right UE strength to 5/5 throughout all planes to aid in completion of ADLs.                 Status at IE: right shoulder 2/5                 Current: right shoulder 2+/5         In-progress, 8/28/2024                    Patient will increase QuickDASH: Disability Arm, Shoulder, Hand score by MCID of 19% to demonstrate improvement in functional status.                  Status at IE: 70.5%                 Current: 31.8%       In-progress, 8/28/2024    Summary of Care/ Key Functional Changes:     Patient has been well motivated, consistent and diligent with PT and HEP and as a result is making good progress towards goals. Right shoulder AROM is steadily improving, pain is minimal most of the time, except for when patient forgets at home and actively uses right shoulder for ADLs. Encouraging patient to be consistently cautious with ADLs to reduce risk of traumatizing surgical repair.     ASSESSMENT/RECOMMENDATIONS:   Patient would benefit from the continuation of skilled rehab interventions for functional progress to achieving above stated clinically significant goals. Continue per plan of care.     Thank you for this referral.   Alex Trejo, PT 8/28/2024 11:48 AM

## 2024-08-28 NOTE — PROGRESS NOTES
PHYSICAL / OCCUPATIONAL THERAPY - DAILY TREATMENT NOTE (updated )    Patient Name: Nhung Miranda    Date: 2024    : 1955  Insurance: Payor: MEDICARE / Plan: MEDICARE PART A AND B / Product Type: *No Product type* /      Patient  verified Yes     Visit #   Current / Total 8 24   Time   In / Out 1052 1156   Pain   In / Out 2 0   Subjective Functional Status/Changes: \"Shoulder is a bit sore today. I try to be careful in how I move it. But, every once in a while I forget and move quick the wrong way and it is sore for a day or two afterwards.\"   Changes to:  Meds, Allergies, Med Hx, Sx Hx?  If yes, update Summary List no       TREATMENT AREA =  Pain in right shoulder [M25.511]    If an interpreting service is utilized for treatment of this patient, the contents of this document represent the material reviewed with the patient via the .     OBJECTIVE  Modalities Rationale:     decrease edema and decrease pain to improve patient's ability to Complete ADLS              10 min [x]  Vasopneumatic Device, press/temp: Medium, low         If using vaso (need to measure limb vaso being performed on)      pre-treatment girth : 43.4 cm.      post-treatment girth : 43.1 cm.      measured at (landmark location): ac joint        min []  Other:     [x] Skin assessment post-treatment (if applicable):    [x]  intact    []  redness- no adverse reaction     []redness - adverse reaction:      Therapeutic Procedures:  Tx Min Billable or 1:1 Min (if diff from Tx Min) Procedure, Rationale, Specifics   39 [phone] [id number] Therapeutic Exercise (timed):  increase ROM, strength, coordination, balance, and proprioception to improve patient's ability to progress to PLOF and address remaining functional goals. (see flow sheet as applicable)     Details if applicable:       15  83203 Manual Therapy (timed):  decrease pain, increase ROM, increase tissue extensibility, and decrease edema to improve patient's ability to progress

## 2024-09-04 ENCOUNTER — HOSPITAL ENCOUNTER (OUTPATIENT)
Facility: HOSPITAL | Age: 69
Setting detail: RECURRING SERIES
Discharge: HOME | End: 2024-09-07
Payer: MEDICARE

## 2024-09-04 PROCEDURE — 97530 THERAPEUTIC ACTIVITIES: CPT

## 2024-09-04 PROCEDURE — 97112 NEUROMUSCULAR REEDUCATION: CPT

## 2024-09-04 PROCEDURE — 97110 THERAPEUTIC EXERCISES: CPT

## 2024-09-04 PROCEDURE — 97016 VASOPNEUMATIC DEVICE THERAPY: CPT

## 2024-09-04 NOTE — PROGRESS NOTES
PHYSICAL / OCCUPATIONAL THERAPY - DAILY TREATMENT NOTE     Patient Name: Nhung Miranda    Date: 2024    : 1955  Insurance: Payor: MEDICARE / Plan: MEDICARE PART A AND B / Product Type: *No Product type* /      Patient  verified Yes     Visit #   Current / Total 9 24   Time   In / Out 16:12 17:00   Pain   In / Out 2 0   Subjective Functional Status/Changes: Pt reports no new complaints   Changes to:  Allergies, Med Hx, Sx Hx?   no       TREATMENT AREA =  Pain in right shoulder [M25.511]    If an interpreting service is utilized for treatment of this patient, the contents of this document represent the material reviewed with the patient via the .     OBJECTIVE    Modalities Rationale:     decrease edema and decrease pain to improve patient's ability to progress to PLOF and address remaining functional goals.     min [] Estim Unattended, type/location:                                      []  w/ice    []  w/heat    min [] Estim Attended, type/location:                                     []  w/US     []  w/ice    []  w/heat    []  TENS insruct      min []  Mechanical Traction: type/lbs                   []  pro   []  sup   []  int   []  cont    []  before manual    []  after manual    min []  Ultrasound, settings/location:      min []  Iontophoresis w/ dexamethasone, location:                                               []  take home patch       []  in clinic        min  unbilled []  Ice     []  Heat    location/position:     min []  Paraffin,  details:    10 min [x]  Vasopneumatic Device, press/temp: Medium, Low    min []  Whirlpool / Fluido:    If using vaso (only need to measure limb vaso being performed on)      pre-treatment girth : 43.2 cm      post-treatment girth : 43 cm      measured at (landmark location) :  AC joint    min []  Other:    Skin assessment post-treatment (if applicable):    [x]  intact    []  redness- no adverse reaction                 []redness - adverse reaction:

## 2024-09-05 ENCOUNTER — TELEPHONE (OUTPATIENT)
Facility: HOSPITAL | Age: 69
End: 2024-09-05

## 2024-09-06 ENCOUNTER — APPOINTMENT (OUTPATIENT)
Facility: HOSPITAL | Age: 69
End: 2024-09-06
Payer: MEDICARE

## 2024-09-27 ENCOUNTER — HOSPITAL ENCOUNTER (OUTPATIENT)
Facility: HOSPITAL | Age: 69
Setting detail: RECURRING SERIES
End: 2024-09-27
Payer: MEDICARE

## 2024-09-27 ENCOUNTER — TELEPHONE (OUTPATIENT)
Facility: HOSPITAL | Age: 69
End: 2024-09-27

## 2024-10-01 ENCOUNTER — HOSPITAL ENCOUNTER (OUTPATIENT)
Facility: HOSPITAL | Age: 69
Setting detail: RECURRING SERIES
Discharge: HOME | End: 2024-10-04
Payer: MEDICARE

## 2024-10-01 PROCEDURE — 97016 VASOPNEUMATIC DEVICE THERAPY: CPT

## 2024-10-01 PROCEDURE — 97530 THERAPEUTIC ACTIVITIES: CPT

## 2024-10-01 PROCEDURE — 97110 THERAPEUTIC EXERCISES: CPT

## 2024-10-01 PROCEDURE — 97112 NEUROMUSCULAR REEDUCATION: CPT

## 2024-10-01 NOTE — PROGRESS NOTES
PHYSICAL / OCCUPATIONAL THERAPY - DAILY TREATMENT NOTE (updated )    Patient Name: Nhung Miranda    Date: 10/1/2024    : 1955  Insurance: Payor: MEDICARE / Plan: MEDICARE PART A AND B / Product Type: *No Product type* /      Patient  verified Yes     Visit #   Current / Total 10 24   Time   In / Out 0812 0911   Pain   In / Out 2 0   Subjective Functional Status/Changes: \"I had to miss last few weeks of PT due to out of state family emergency. My shoulder pain is not bad in the morning, but bothers me when I try to sleep. I think I have been overdoing it using my shoulder.\"   Changes to:  Meds, Allergies, Med Hx, Sx Hx?  If yes, update Summary List no       TREATMENT AREA =  Pain in right shoulder [M25.511]    If an interpreting service is utilized for treatment of this patient, the contents of this document represent the material reviewed with the patient via the .     OBJECTIVE  Modalities Rationale:     decrease edema and decrease pain to improve patient's ability to Complete ADLS  10 min [x]  Vasopneumatic Device, press/temp: Medium, low      If using vaso (need to measure limb vaso being performed on)      pre-treatment girth : 44.5 cm.      post-treatment girth : 43.9 cm.      measured at (landmark location): AC joint      min []  Other:    [x] Skin assessment post-treatment (if applicable):    [x]  intact    []  redness- no adverse reaction     []redness - adverse reaction:        Therapeutic Procedures:  Tx Min Billable or 1:1 Min (if diff from Tx Min) Procedure, Rationale, Specifics     18956 Therapeutic Exercise (timed):  increase ROM, strength, coordination, balance, and proprioception to improve patient's ability to progress to PLOF and address remaining functional goals. (see flow sheet as applicable)     Details if applicable:       15  73321 Therapeutic Activity (timed):  use of dynamic activities replicating functional movements to increase ROM, strength, coordination,

## 2024-10-01 NOTE — PROGRESS NOTES
[facility]  [address] [address]                                                                                      Phone: [phone]   Fax: [phone]    Progress Note  Patient name: Nhung Miranda Start of Care: 2024   Referral source: Leeroy Ibrahim MD : 1955               Medical Diagnosis: Pain in right shoulder [M25.511]      Onset Date: 2024               Treatment Diagnosis:  M25.511  RIGHT SHOULDER PAIN                                          Prior Hospitalization: see medical history Provider#: [id number]   Medications: Verified on Patient Summary List      Comorbidities: pre-diabetic, low back and left hip pain. Surgical Hx: lumbar fusion x 3.   Prior Level of Function: independent in all household ADLs, reports very physically active being on feet most of the day     Visits from Start of Care: 10    Missed Visits: 3    Updated Goals/Measure of Progress: To be achieved in 8 weeks:    Short Term Goals: To be accomplished in 6 weeks:                 Patient will report compliance with initial/basic HEP at least 1x/day to aid in rehabilitation program.                 Status at IE: Patient instructed in and provided written copy of initial Home Exercise Program.                 Current: Advancing from ROM to light resistance exercises per protocol.      In-progress, 10/1/2024                    Patient will display full right shoulder pain free AROM flex to 150, ext to 58, abd to 160, ER to 68 to aid in completion of ADLs.                 Status at IE: PROM flex 103, ext 23, abd 58, ER 23                 Current: AROM flex 146, ext 38, abd 128, ER 51     In-progress, 10/1/2024     Long Term Goals: To be accomplished in 12 weeks:                 Patient will report compliance with comprehensive HEP a least 3-4x/week to aid in rehabilitation/strengthening program.                 Status at IE: Patient instructed in and provided written

## 2024-10-03 ENCOUNTER — TELEPHONE (OUTPATIENT)
Facility: HOSPITAL | Age: 69
End: 2024-10-03

## 2024-10-03 ENCOUNTER — HOSPITAL ENCOUNTER (OUTPATIENT)
Facility: HOSPITAL | Age: 69
Setting detail: RECURRING SERIES
Discharge: HOME | End: 2024-10-06
Payer: MEDICARE

## 2024-10-03 PROCEDURE — 97016 VASOPNEUMATIC DEVICE THERAPY: CPT

## 2024-10-03 PROCEDURE — 97110 THERAPEUTIC EXERCISES: CPT

## 2024-10-03 PROCEDURE — 97530 THERAPEUTIC ACTIVITIES: CPT

## 2024-10-03 NOTE — PROGRESS NOTES
PHYSICAL / OCCUPATIONAL THERAPY - DAILY TREATMENT NOTE (updated )    Patient Name: Nhung Miranda    Date: 10/3/2024    : 1955  Insurance: Payor: MEDICARE / Plan: MEDICARE PART A AND B / Product Type: *No Product type* /      Patient  verified Yes     Visit #   Current / Total 11 24   Time   In / Out 1625 1710   Pain   In / Out 2 2   Subjective Functional Status/Changes: \"I am sorry I am late I got my times mixed up.\"   Changes to:  Meds, Allergies, Med Hx, Sx Hx?  If yes, update Summary List no       TREATMENT AREA =  Pain in right shoulder [M25.511]    If an interpreting service is utilized for treatment of this patient, the contents of this document represent the material reviewed with the patient via the .     OBJECTIVE  Modalities Rationale:     decrease edema and decrease pain to improve patient's ability to Complete ADLS  10 min [x]  Vasopneumatic Device, press/temp: Medium, low      If using vaso (need to measure limb vaso being performed on)      pre-treatment girth : 44.5 cm.      post-treatment girth : 44.1 cm.      measured at (landmark location): Acromion right    min []  Other:    [x] Skin assessment post-treatment (if applicable):    [x]  intact    []  redness- no adverse reaction     []redness - adverse reaction:        Therapeutic Procedures:  Tx Min Billable or 1:1 Min (if diff from Tx Min) Procedure, Rationale, Specifics   22  05123 Therapeutic Exercise (timed):  increase ROM, strength, coordination, balance, and proprioception to improve patient's ability to progress to PLOF and address remaining functional goals. (see flow sheet as applicable)     Details if applicable:       13  23562 Therapeutic Activity (timed):  use of dynamic activities replicating functional movements to increase ROM, strength, coordination, balance, and proprioception in order to improve patient's ability to progress to PLOF and address remaining functional goals.  (see flow sheet as applicable) 
Add 18152 Cpt? (Important Note: In 2017 The Use Of 62999 Is Being Tracked By Cms To Determine Future Global Period Reimbursement For Global Periods): no
Detail Level: Detailed

## 2024-10-08 ENCOUNTER — HOSPITAL ENCOUNTER (OUTPATIENT)
Facility: HOSPITAL | Age: 69
Setting detail: RECURRING SERIES
Discharge: HOME | End: 2024-10-11
Payer: MEDICARE

## 2024-10-08 PROCEDURE — 97530 THERAPEUTIC ACTIVITIES: CPT

## 2024-10-08 PROCEDURE — 97112 NEUROMUSCULAR REEDUCATION: CPT

## 2024-10-08 PROCEDURE — 97016 VASOPNEUMATIC DEVICE THERAPY: CPT

## 2024-10-08 PROCEDURE — 97110 THERAPEUTIC EXERCISES: CPT

## 2024-10-08 NOTE — PROGRESS NOTES
PHYSICAL / OCCUPATIONAL THERAPY - DAILY TREATMENT NOTE (updated )    Patient Name: Nhung Miranda    Date: 10/8/2024    : 1955  Insurance: Payor: MEDICARE / Plan: MEDICARE PART A AND B / Product Type: *No Product type* /      Patient  verified Yes     Visit #   Current / Total 12 24   Time   In / Out 1610 1700   Pain   In / Out 2 0   Subjective Functional Status/Changes: \"I am doing well today I am having less pain.\"   Changes to:  Meds, Allergies, Med Hx, Sx Hx?  If yes, update Summary List no       TREATMENT AREA =  Pain in right shoulder [M25.511]    If an interpreting service is utilized for treatment of this patient, the contents of this document represent the material reviewed with the patient via the .     OBJECTIVE  Modalities Rationale:     decrease edema and decrease pain to improve patient's ability to Complete ADLS  10 min [x]  Vasopneumatic Device, press/temp: Medium, low      If using vaso (need to measure limb vaso being performed on)      pre-treatment girth : 44.7 cm.      post-treatment girth : 44.1 cm.      measured at (landmark location): Acromion right    min []  Other:    [x] Skin assessment post-treatment (if applicable):    [x]  intact    []  redness- no adverse reaction     []redness - adverse reaction:        Therapeutic Procedures:  Tx Min Billable or 1:1 Min (if diff from Tx Min) Procedure, Rationale, Specifics   22  45742 Therapeutic Exercise (timed):  increase ROM, strength, coordination, balance, and proprioception to improve patient's ability to progress to PLOF and address remaining functional goals. (see flow sheet as applicable)     Details if applicable:       10  09650 Therapeutic Activity (timed):  use of dynamic activities replicating functional movements to increase ROM, strength, coordination, balance, and proprioception in order to improve patient's ability to progress to PLOF and address remaining functional goals.  (see flow sheet as applicable)

## 2024-10-11 ENCOUNTER — HOSPITAL ENCOUNTER (OUTPATIENT)
Facility: HOSPITAL | Age: 69
Setting detail: RECURRING SERIES
Discharge: HOME | End: 2024-10-14
Payer: MEDICARE

## 2024-10-11 PROCEDURE — 97016 VASOPNEUMATIC DEVICE THERAPY: CPT

## 2024-10-11 PROCEDURE — 97112 NEUROMUSCULAR REEDUCATION: CPT

## 2024-10-11 PROCEDURE — 97530 THERAPEUTIC ACTIVITIES: CPT

## 2024-10-11 PROCEDURE — 97110 THERAPEUTIC EXERCISES: CPT

## 2024-10-14 ENCOUNTER — HOSPITAL ENCOUNTER (OUTPATIENT)
Facility: HOSPITAL | Age: 69
Setting detail: RECURRING SERIES
Discharge: HOME | End: 2024-10-17
Payer: MEDICARE

## 2024-10-14 PROCEDURE — 97016 VASOPNEUMATIC DEVICE THERAPY: CPT

## 2024-10-14 PROCEDURE — 97112 NEUROMUSCULAR REEDUCATION: CPT

## 2024-10-14 PROCEDURE — 97530 THERAPEUTIC ACTIVITIES: CPT

## 2024-10-14 PROCEDURE — 97110 THERAPEUTIC EXERCISES: CPT

## 2024-10-14 NOTE — PROGRESS NOTES
PHYSICAL / OCCUPATIONAL THERAPY - DAILY TREATMENT NOTE (updated )    Patient Name: Nhung Miranda    Date: 10/14/2024    : 1955  Insurance: Payor: MEDICARE / Plan: MEDICARE PART A AND B / Product Type: *No Product type* /      Patient  verified Yes     Visit #   Current / Total 14 24   Time   In / Out 0855 0950   Pain   In / Out 2 0   Subjective Functional Status/Changes: \"After the isometrics last visit I had no more radiating arm pain or paresthesia.\"   Changes to:  Meds, Allergies, Med Hx, Sx Hx?  If yes, update Summary List no       TREATMENT AREA =  Pain in right shoulder [M25.511]    If an interpreting service is utilized for treatment of this patient, the contents of this document represent the material reviewed with the patient via the .     OBJECTIVE  Modalities Rationale:     decrease edema and decrease pain to improve patient's ability to Complete ADLS         10 min [x]  Vasopneumatic Device, press/temp: Medium, low         If using vaso (need to measure limb vaso being performed on)      pre-treatment girth : 44.5  cm.      post-treatment girth : 44.4 cm.      measured at (landmark location): Acromion right     min []  Other:     [x] Skin assessment post-treatment (if applicable):    [x]  intact    []  redness- no adverse reaction     []redness - adverse reaction:      Therapeutic Procedures:  Tx Min Billable or 1:1 Min (if diff from Tx Min) Procedure, Rationale, Specifics   25 20 50740 Therapeutic Exercise (timed):  increase ROM, strength, coordination, balance, and proprioception to improve patient's ability to progress to PLOF and address remaining functional goals. (see flow sheet as applicable)     Details if applicable:       10 10 64107 Therapeutic Activity (timed):  use of dynamic activities replicating functional movements to increase ROM, strength, coordination, balance, and proprioception in order to improve patient's ability to progress to PLOF and address

## 2024-10-17 ENCOUNTER — HOSPITAL ENCOUNTER (OUTPATIENT)
Facility: HOSPITAL | Age: 69
Setting detail: RECURRING SERIES
Discharge: HOME | End: 2024-10-20
Payer: MEDICARE

## 2024-10-17 PROCEDURE — 97530 THERAPEUTIC ACTIVITIES: CPT

## 2024-10-17 PROCEDURE — 97016 VASOPNEUMATIC DEVICE THERAPY: CPT

## 2024-10-17 PROCEDURE — 97112 NEUROMUSCULAR REEDUCATION: CPT

## 2024-10-17 PROCEDURE — 97110 THERAPEUTIC EXERCISES: CPT

## 2024-10-17 NOTE — PROGRESS NOTES
PTA [facility]   10/24/2024  9:30 AM Lashon Jacobson, PTA [facility]   10/29/2024  6:10 PM Alex Trejo, PT [facility]   10/31/2024 10:10 AM Alex Trejo, PT [facility]

## 2024-10-21 ENCOUNTER — HOSPITAL ENCOUNTER (OUTPATIENT)
Facility: HOSPITAL | Age: 69
Setting detail: RECURRING SERIES
End: 2024-10-21
Payer: MEDICARE

## 2024-10-21 ENCOUNTER — TELEPHONE (OUTPATIENT)
Facility: HOSPITAL | Age: 69
End: 2024-10-21

## 2024-10-22 ENCOUNTER — TELEPHONE (OUTPATIENT)
Facility: HOSPITAL | Age: 69
End: 2024-10-22

## 2024-10-24 ENCOUNTER — APPOINTMENT (OUTPATIENT)
Facility: HOSPITAL | Age: 69
End: 2024-10-24
Payer: MEDICARE

## 2024-10-28 ENCOUNTER — TELEPHONE (OUTPATIENT)
Facility: HOSPITAL | Age: 69
End: 2024-10-28

## 2024-10-29 ENCOUNTER — APPOINTMENT (OUTPATIENT)
Facility: HOSPITAL | Age: 69
End: 2024-10-29
Payer: MEDICARE

## 2024-10-31 ENCOUNTER — APPOINTMENT (OUTPATIENT)
Facility: HOSPITAL | Age: 69
End: 2024-10-31
Payer: MEDICARE

## 2024-11-01 ENCOUNTER — HOSPITAL ENCOUNTER (OUTPATIENT)
Facility: HOSPITAL | Age: 69
Setting detail: RECURRING SERIES
Discharge: HOME | End: 2024-11-04
Payer: MEDICARE

## 2024-11-01 PROCEDURE — 97530 THERAPEUTIC ACTIVITIES: CPT

## 2024-11-01 PROCEDURE — 97110 THERAPEUTIC EXERCISES: CPT

## 2024-11-01 PROCEDURE — 97112 NEUROMUSCULAR REEDUCATION: CPT

## 2024-11-01 NOTE — PROGRESS NOTES
PHYSICAL / OCCUPATIONAL THERAPY - DAILY TREATMENT NOTE (updated )    Patient Name: Nhung Miranda    Date: 2024    : 1955  Insurance: Payor: MEDICARE / Plan: MEDICARE PART A AND B / Product Type: *No Product type* /      Patient  verified Yes     Visit #   Current / Total 16 24   Time   In / Out 1250 1339   Pain   In / Out 1 1   Subjective Functional Status/Changes: \"If you show me what I should and shouldn't do in my gym I think I will be ready to continue on my own.\"   Changes to:  Meds, Allergies, Med Hx, Sx Hx?  If yes, update Summary List no       TREATMENT AREA =  Pain in right shoulder [M25.511]    If an interpreting service is utilized for treatment of this patient, the contents of this document represent the material reviewed with the patient via the .     OBJECTIVE    Therapeutic Procedures:  Tx Min Billable or 1:1 Min (if diff from Tx Min) Procedure, Rationale, Specifics   19  15909 Therapeutic Exercise (timed):  increase ROM, strength, coordination, balance, and proprioception to improve patient's ability to progress to PLOF and address remaining functional goals. (see flow sheet as applicable)     Details if applicable:       15  87473 Therapeutic Activity (timed):  use of dynamic activities replicating functional movements to increase ROM, strength, coordination, balance, and proprioception in order to improve patient's ability to progress to PLOF and address remaining functional goals.  (see flow sheet as applicable)     Details if applicable:     15  67729 Neuromuscular Re-Education (timed):  improve balance, coordination, kinesthetic sense, posture, core stability and proprioception to improve patient's ability to develop conscious control of individual muscles and awareness of position of extremities in order to progress to PLOF and address remaining functional goals. (see flow sheet as applicable)     Details if applicable:     49   BC Totals Reminder: bill using 
Physical Therapy Discharge Instructions    [facility]  [address] [address]  Phone: [phone]   Fax: [phone]      Patient: Nhung Miranda  : 1955    Continue Home Exercise Program 2 times per day for 6 weeks, then decrease to 4 times per week      Continue with    [x] Ice  as needed      [x] Heat           Follow up with MD:     [] Upon completion of therapy     [x] As needed      Recommendations:     []   Return to activity with home program    [x]   Return to activity with the following modifications:       []Post Rehab Program    []Join Independent aquatic program     [x]Return to/join local gym      Additional Comments: Please contact clinic at above phone number if you have any questions regarding Home Exercise Program or self care instructions.    
to do in her gym and is independent in HEP.     Met, 11/1/2024                    Patient will report no pain greater than 1-2/10 with overhead activities to aid in completion of ADLs.                 Status at IE: 6-10/10                 Current: 1-2/10 most of the time     Met, 11/1/2024                    Patient will increase right UE strength to 5/5 throughout all planes to aid in completion of ADLs.                 Status at IE: right shoulder 2/5                 Current: right shoulder 3+/5         In-progress, 11/1/2024                    Patient will increase QuickDASH: Disability Arm, Shoulder, Hand score by MCID of 19% to demonstrate improvement in functional status.                  Status at IE: 70.5%                 Current: 36.4%       Met, 11/1/2024    Assessment/ Summary of Care:   Patient has been well motivated and diligent with PT and HEP and as a result is making consistent, gradual progress towards goals. Right shoulder AROM is increasing and functional strength is making gradual gains. Pain intensity remains low. Patient does have tendency to be over active per protocol restrictions. Patient is now fully independent in comprehensive HEP and is ready to discontinue PT and transition to independent self care.       RECOMMENDATIONS:  [x]Discontinue therapy: [x]Patient has reached or is progressing toward set goals      []Patient is non-compliant or has abdicated      []Due to lack of appreciable progress towards set goals    Alex Trejo, PT 11/1/2024 1:41 PM

## 2024-12-19 NOTE — PROGRESS NOTES
[facility] at [address] [place] [address] Drive: [phone]   Fax: [phone]  PLAN OF CARE / [address] [place] [address] PHYSICAL THERAPY SERVICES  Patient Name: Aggie Wise : 1955   Medical   Diagnosis: Other low back pain [M54.59] Treatment Diagnosis: Low back pain   Onset Date: 3/29/2022     Referral Source: [facility] [place] [facility] of Care [facility]): 2022   Prior Hospitalization: See medical history Provider #: [id number]   Prior Level of Function: treadmill 3 miles 3-4 x/wk   Comorbidities: diabetes, OA, HTN, osteoporosis, colon cancer, lumbar fusion L3-4   Medications: Verified on Patient Summary List   The Plan of Care and following information is based on the information from the initial evaluation.   ===========================================================================================  Assessment / clarke information:  Aggie Wise is a 69 y.o.  female who presents s/p surgical extension of lumbar fusion to L4-5 level performed 3/29/2022 with decreased lumbar AROM, decreased core/abdominal and bilateral LE strength, moderate to severe pain, decreased stand/walk tolerance and moderate difficulty with household ADLs. Patient will continue to benefit from skilled PT services to modify and progress therapeutic interventions, address functional mobility deficits, address ROM deficits, address strength deficits, analyze and address soft tissue restrictions, analyze and cue movement patterns, analyze and modify body mechanics/ergonomics and assess and modify postural abnormalities to attain remaining goals.      Pt instructed in HEP and will f/u in clinic for PT.  ===========================================================================================  Eval Complexity: History MEDIUM  Complexity : 1-2 comorbidities / personal factors will impact the outcome/ POC ;  Examination  LOW Complexity : 1-2 Standardized tests and measures addressing body structure, function, activity limitation and / or participation in recreation ; Presentation LOW Complexity : Stable, uncomplicated ;  Decision Making MEDIUM Complexity : FOTO score of 26-74; : FOTO score = an established functional score where 100 = no disability  Overall Complexity LOW   Problem List: pain affecting function, decrease ROM, decrease strength, impaired gait/ balance, decrease ADL/ functional abilitiies, decrease activity tolerance and decrease flexibility/ joint mobility   Treatment Plan may include any combination of the following: Therapeutic exercise, Therapeutic activities, Neuromuscular re-education, Gait/balance training, Aquatic therapy, Patient education, Self Care training and Functional mobility training  Patient / Family readiness to learn indicated by: asking questions, trying to perform skills and interest  Persons(s) to be included in education: patient (P)  Barriers to Learning/Limitations: no  Measures Taken: NA  Patient Goal (s): I want to be able to walk again without pain. \"   Patient self reported health status: fair  Rehabilitation Potential: good  Goals:  Short Term Goals: To be accomplished in 4 weeks:   Patient will report compliance with HEP 1x/day to aid in rehabilitation program.   Status at IE: Patient instructed in and provided written copy of initial Home Exercise Program.   Current: Same as IE      Patient will increase lumbar ROM flexion to fingertips to floor to aid in completion of ADLs. Status at IE: lumbar flexion fingertips 8 inches from floor. Current: Same as IE    Long Term Goals: To be accomplished in 8 weeks:   Patient will increase [place] LE strength to 5/5 MMT throughout to aid in completion of ADLs. Status at IE: bilateral LE strength 3+/5   Current: Same as IE     Patient will report pain no greater than 1-3/10 throughout entire day to aid in completion of ADLs.    Status at IE: 3-7/10   Current: Same as IE     Patent will improve stand/walk tolerance to 30 minutes to be able to  complete household ADLs. Status at IE: <10 minutes. Current: Same as IE     Patient will improve FOTO (an established functional score where 100 = no disability) score to 57 points to demonstrate improvement in functional status. Status at IE:52   Current: Same as IE        Frequency / Duration:   Patient to be seen 2  times per week for 8  weeks:  Patient / Caregiver education and instruction: self care and exercises      . Therapist Signature: Naida Turner DPT Date: 4/69/2996   Certification Period: 6/29/2022 to 9/25/2022 Time: 12:25 PM   ===========================================================================================  I certify that the above Physical Therapy Services are being furnished while the patient is under my care. I agree with the treatment plan and certify that this therapy is necessary. Physician Signature:        Date:       Time:        Charissa Boone, [address]    Please sign and return to [facility] or you may fax the signed copy to [phone]. Thank you. 36.5